# Patient Record
Sex: MALE | Race: BLACK OR AFRICAN AMERICAN | NOT HISPANIC OR LATINO
[De-identification: names, ages, dates, MRNs, and addresses within clinical notes are randomized per-mention and may not be internally consistent; named-entity substitution may affect disease eponyms.]

---

## 2021-01-08 ENCOUNTER — NON-APPOINTMENT (OUTPATIENT)
Age: 66
End: 2021-01-08

## 2021-01-11 ENCOUNTER — NON-APPOINTMENT (OUTPATIENT)
Age: 66
End: 2021-01-11

## 2021-01-11 ENCOUNTER — APPOINTMENT (OUTPATIENT)
Dept: INTERNAL MEDICINE | Facility: CLINIC | Age: 66
End: 2021-01-11
Payer: COMMERCIAL

## 2021-01-11 VITALS
BODY MASS INDEX: 32.08 KG/M2 | OXYGEN SATURATION: 98 % | WEIGHT: 202 LBS | HEART RATE: 78 BPM | DIASTOLIC BLOOD PRESSURE: 98 MMHG | SYSTOLIC BLOOD PRESSURE: 130 MMHG | HEIGHT: 66.5 IN

## 2021-01-11 DIAGNOSIS — Z82.49 FAMILY HISTORY OF ISCHEMIC HEART DISEASE AND OTHER DISEASES OF THE CIRCULATORY SYSTEM: ICD-10-CM

## 2021-01-11 DIAGNOSIS — Z12.5 ENCOUNTER FOR SCREENING FOR MALIGNANT NEOPLASM OF PROSTATE: ICD-10-CM

## 2021-01-11 DIAGNOSIS — Z83.3 FAMILY HISTORY OF DIABETES MELLITUS: ICD-10-CM

## 2021-01-11 DIAGNOSIS — Z82.0 FAMILY HISTORY OF EPILEPSY AND OTHER DISEASES OF THE NERVOUS SYSTEM: ICD-10-CM

## 2021-01-11 DIAGNOSIS — Z80.42 FAMILY HISTORY OF MALIGNANT NEOPLASM OF PROSTATE: ICD-10-CM

## 2021-01-11 PROCEDURE — 99386 PREV VISIT NEW AGE 40-64: CPT | Mod: 25

## 2021-01-11 PROCEDURE — 90472 IMMUNIZATION ADMIN EACH ADD: CPT

## 2021-01-11 PROCEDURE — 90715 TDAP VACCINE 7 YRS/> IM: CPT

## 2021-01-11 PROCEDURE — 90732 PPSV23 VACC 2 YRS+ SUBQ/IM: CPT

## 2021-01-11 PROCEDURE — 93000 ELECTROCARDIOGRAM COMPLETE: CPT

## 2021-01-11 PROCEDURE — 99072 ADDL SUPL MATRL&STAF TM PHE: CPT

## 2021-01-11 PROCEDURE — G0009: CPT

## 2021-01-11 RX ORDER — PEN NEEDLE, DIABETIC 29 G X1/2"
32G X 4 MM NEEDLE, DISPOSABLE MISCELLANEOUS
Qty: 100 | Refills: 0 | Status: DISCONTINUED | COMMUNITY
Start: 2020-11-03 | End: 2021-01-11

## 2021-01-11 RX ORDER — ASPIRIN 81 MG
81 TABLET, DELAYED RELEASE (ENTERIC COATED) ORAL
Refills: 0 | Status: ACTIVE | COMMUNITY

## 2021-01-11 RX ORDER — BLOOD-GLUCOSE METER
W/DEVICE EACH MISCELLANEOUS
Qty: 1 | Refills: 0 | Status: ACTIVE | COMMUNITY
Start: 2020-11-24

## 2021-01-11 NOTE — ASSESSMENT
[FreeTextEntry1] : 1.  General health maintenance -reports that he had a colonoscopy but unsure of date.  Has never had a Pneumovax despite being diabetic and has not had a tetanus shot for years.  Did get a flu shot this past fall.  Depression screening - a PHQ2 was administered to the patient and reviewed at the time of the visit.  The PHQ2 was negative and no further action is required.  Will rescreen in 1 year or prn.  Time <15 min.\par 2.  Diabetes -does not check his sugars at home.  Will check a hemoglobin A1c.  Reiterated need for foot care.  Reminded of need for a yearly eye exam.\par 3.  Hypertension -BP is not optimal but he has not taken his medications in 2 days.  Advised to restart his medications and will recheck blood pressure at next visit.\par 4.  Hyperlipidemia tolerating statin.  Will check lipids.\par 5.  Labs as per plan.\par 6.  Follow-up in 3 months or as needed.

## 2021-01-11 NOTE — HEALTH RISK ASSESSMENT
[Very Good] : ~his/her~  mood as very good [] : No [No] : In the past 12 months have you used drugs other than those required for medical reasons? No [No falls in past year] : Patient reported no falls in the past year [0] : 2) Feeling down, depressed, or hopeless: Not at all (0) [de-identified] : Occasional [de-identified] : No regular activity [HKA3Sclcg] : 0 [Patient reported colonoscopy was normal] : Patient reported colonoscopy was normal [Change in mental status noted] : No change in mental status noted [Language] : denies difficulty with language [Behavior] : denies difficulty with behavior [Learning/Retaining New Information] : denies difficulty learning/retaining new information [Handling Complex Tasks] : denies difficulty handling complex tasks [Reasoning] : denies difficulty with reasoning [Spatial Ability and Orientation] : denies difficulty with spatial ability and orientation [With Significant Other] : lives with significant other [Employed] : employed [Fully functional (bathing, dressing, toileting, transferring, walking, feeding)] : Fully functional (bathing, dressing, toileting, transferring, walking, feeding) [Fully functional (using the telephone, shopping, preparing meals, housekeeping, doing laundry, using] : Fully functional and needs no help or supervision to perform IADLs (using the telephone, shopping, preparing meals, housekeeping, doing laundry, using transportation, managing medications and managing finances) [Reports changes in hearing] : Reports no changes in hearing [Reports changes in vision] : Reports no changes in vision [Smoke Detector] : smoke detector [Carbon Monoxide Detector] : carbon monoxide detector [Guns at Home] : no guns at home [Seat Belt] :  uses seat belt [With Patient/Caregiver] : With Patient/Caregiver [AdvancecareDate] : 01/21 [FreeTextEntry4] : Encouraged to complete a healthcare proxy

## 2021-01-11 NOTE — HISTORY OF PRESENT ILLNESS
[de-identified] : The patient is a 65-year-old male with history of diabetes, hypertension and hyperlipidemia who presents to the office today to establish care.  He also complains of arthritis in his knees and is wondering if he could see an orthopedist.  He is otherwise well he has no specific complaints at this time.  Is overdue for a visit with the eye doctor but when he went in 2019 there were no problems.  Denies any foot lesions.  No problems with myalgias on statin and no complaints of lightheadedness or dizziness.

## 2021-01-11 NOTE — REVIEW OF SYSTEMS
[Negative] : Heme/Lymph [FreeTextEntry9] : Bilateral knee pain and some lower back pain.  Uses Tylenol as needed

## 2021-01-11 NOTE — PHYSICAL EXAM
[No Acute Distress] : no acute distress [Well Nourished] : well nourished [Well Developed] : well developed [Well-Appearing] : well-appearing [Normal Sclera/Conjunctiva] : normal sclera/conjunctiva [EOMI] : extraocular movements intact [Normal Outer Ear/Nose] : the outer ears and nose were normal in appearance [No JVD] : no jugular venous distention [No Lymphadenopathy] : no lymphadenopathy [Supple] : supple [Thyroid Normal, No Nodules] : the thyroid was normal and there were no nodules present [No Respiratory Distress] : no respiratory distress  [No Accessory Muscle Use] : no accessory muscle use [Clear to Auscultation] : lungs were clear to auscultation bilaterally [Normal Rate] : normal rate  [Regular Rhythm] : with a regular rhythm [Normal S1, S2] : normal S1 and S2 [No Murmur] : no murmur heard [No Carotid Bruits] : no carotid bruits [No Abdominal Bruit] : a ~M bruit was not heard ~T in the abdomen [No Varicosities] : no varicosities [Pedal Pulses Present] : the pedal pulses are present [No Edema] : there was no peripheral edema [No Palpable Aorta] : no palpable aorta [No Extremity Clubbing/Cyanosis] : no extremity clubbing/cyanosis [Soft] : abdomen soft [Non Tender] : non-tender [Non-distended] : non-distended [No Masses] : no abdominal mass palpated [No HSM] : no HSM [Normal Bowel Sounds] : normal bowel sounds [Normal Posterior Cervical Nodes] : no posterior cervical lymphadenopathy [Normal Anterior Cervical Nodes] : no anterior cervical lymphadenopathy [No CVA Tenderness] : no CVA  tenderness [No Spinal Tenderness] : no spinal tenderness [No Joint Swelling] : no joint swelling [Grossly Normal Strength/Tone] : grossly normal strength/tone [No Rash] : no rash [Coordination Grossly Intact] : coordination grossly intact [No Focal Deficits] : no focal deficits [Normal Gait] : normal gait [Deep Tendon Reflexes (DTR)] : deep tendon reflexes were 2+ and symmetric [Normal Affect] : the affect was normal [Normal Insight/Judgement] : insight and judgment were intact [Comprehensive Foot Exam Normal] : Right and left foot were examined and both feet are normal. No ulcers in either foot. Toes are normal and with full ROM.  Normal tactile sensation with monofilament testing throughout both feet

## 2021-03-17 LAB
25(OH)D3 SERPL-MCNC: 19.7 NG/ML
ALBUMIN SERPL ELPH-MCNC: 4.5 G/DL
ALP BLD-CCNC: 57 U/L
ALT SERPL-CCNC: 29 U/L
ANION GAP SERPL CALC-SCNC: 12 MMOL/L
AST SERPL-CCNC: 18 U/L
BASOPHILS # BLD AUTO: 0.05 K/UL
BASOPHILS NFR BLD AUTO: 0.5 %
BILIRUB SERPL-MCNC: 0.4 MG/DL
BUN SERPL-MCNC: 11 MG/DL
CALCIUM SERPL-MCNC: 9.7 MG/DL
CHLORIDE SERPL-SCNC: 99 MMOL/L
CHOLEST SERPL-MCNC: 140 MG/DL
CO2 SERPL-SCNC: 27 MMOL/L
CREAT SERPL-MCNC: 1.14 MG/DL
CREAT SPEC-SCNC: 230 MG/DL
EOSINOPHIL # BLD AUTO: 0.21 K/UL
EOSINOPHIL NFR BLD AUTO: 2 %
ESTIMATED AVERAGE GLUCOSE: 186 MG/DL
FOLATE SERPL-MCNC: 10.6 NG/ML
GLUCOSE SERPL-MCNC: 151 MG/DL
HBA1C MFR BLD HPLC: 8.1 %
HCT VFR BLD CALC: 48.1 %
HCV AB SER QL: NONREACTIVE
HCV S/CO RATIO: 0.1 S/CO
HDLC SERPL-MCNC: 45 MG/DL
HGB BLD-MCNC: 15.4 G/DL
IMM GRANULOCYTES NFR BLD AUTO: 0.3 %
LDLC SERPL CALC-MCNC: 72 MG/DL
LYMPHOCYTES # BLD AUTO: 4.22 K/UL
LYMPHOCYTES NFR BLD AUTO: 39.4 %
MAN DIFF?: NORMAL
MCHC RBC-ENTMCNC: 29.4 PG
MCHC RBC-ENTMCNC: 32 GM/DL
MCV RBC AUTO: 91.8 FL
MICROALBUMIN 24H UR DL<=1MG/L-MCNC: 3.8 MG/DL
MICROALBUMIN/CREAT 24H UR-RTO: 16 MG/G
MONOCYTES # BLD AUTO: 0.63 K/UL
MONOCYTES NFR BLD AUTO: 5.9 %
NEUTROPHILS # BLD AUTO: 5.58 K/UL
NEUTROPHILS NFR BLD AUTO: 51.9 %
NONHDLC SERPL-MCNC: 95 MG/DL
PLATELET # BLD AUTO: 214 K/UL
POTASSIUM SERPL-SCNC: 4.2 MMOL/L
PROT SERPL-MCNC: 7.3 G/DL
PSA SERPL-MCNC: 4.24 NG/ML
RBC # BLD: 5.24 M/UL
RBC # FLD: 13.4 %
SODIUM SERPL-SCNC: 138 MMOL/L
T4 FREE SERPL-MCNC: 1 NG/DL
TRIGL SERPL-MCNC: 117 MG/DL
TSH SERPL-ACNC: 0.98 UIU/ML
VIT B12 SERPL-MCNC: 544 PG/ML
WBC # FLD AUTO: 10.72 K/UL

## 2021-04-15 ENCOUNTER — NON-APPOINTMENT (OUTPATIENT)
Age: 66
End: 2021-04-15

## 2021-04-16 ENCOUNTER — APPOINTMENT (OUTPATIENT)
Dept: INTERNAL MEDICINE | Facility: CLINIC | Age: 66
End: 2021-04-16
Payer: COMMERCIAL

## 2021-04-16 VITALS
HEART RATE: 83 BPM | SYSTOLIC BLOOD PRESSURE: 122 MMHG | WEIGHT: 198 LBS | OXYGEN SATURATION: 95 % | DIASTOLIC BLOOD PRESSURE: 84 MMHG | BODY MASS INDEX: 31.48 KG/M2

## 2021-04-16 LAB
GLUCOSE BLDC GLUCOMTR-MCNC: 83
HBA1C MFR BLD HPLC: 7.2

## 2021-04-16 PROCEDURE — 99072 ADDL SUPL MATRL&STAF TM PHE: CPT

## 2021-04-16 PROCEDURE — 83036 HEMOGLOBIN GLYCOSYLATED A1C: CPT | Mod: QW

## 2021-04-16 PROCEDURE — 99214 OFFICE O/P EST MOD 30 MIN: CPT | Mod: 25

## 2021-04-16 PROCEDURE — 82962 GLUCOSE BLOOD TEST: CPT | Mod: NC

## 2021-04-16 NOTE — ASSESSMENT
[FreeTextEntry1] : 1.  Diabetes mellitus -well controlled with marked improvement of his hemoglobin A1c to 7.2 which is essentially at goal.  Foot care reiterated.  He is overdue for an eye exam as his appointments were canceled due to Covid.  Advised him to schedule at his convenience.  Continue current management.\par 2.  Hypertension with blood pressure improved from prior visit.  We will continue to follow.  Continue current management.\par 3.  Elevated PSA on screening labs at first visit.  Will check a free PSA and total for today.\par 4.  Hyperlipidemia.  Last LDL reviewed and was at goal.  Continue atorvastatin 40 mg daily.\par 5.  Acute grief reaction secondary to the loss of his mother.  Patient made aware that we have behavioral health here onsite should he feel the need to speak with someone.  Emotional support given.\par 6.  Follow-up in 3 months or as needed.

## 2021-04-16 NOTE — HEALTH RISK ASSESSMENT
[Patient reported colonoscopy was normal] : Patient reported colonoscopy was normal [ColonoscopyDate] : 01/18

## 2021-04-16 NOTE — HISTORY OF PRESENT ILLNESS
[de-identified] : 66-year-old male with a history of diabetes, hypertension and hyperlipidemia here for a follow-up visit as his hemoglobin A1c was elevated at the time of his initial visit.  He tells me that his mother passed away last month from a stroke so he is feeling somewhat depressed.  Would like to go down to [place] next month which is where his mom was.  Has not been able to procure a Covid vaccine as of yet.  Denies any problems with his meds.  No GI problems since increasing the dose of the metformin.

## 2021-04-17 LAB
PSA FREE FLD-MCNC: 27 %
PSA FREE SERPL-MCNC: 1.27 NG/ML
PSA SERPL-MCNC: 4.72 NG/ML

## 2021-07-22 ENCOUNTER — NON-APPOINTMENT (OUTPATIENT)
Age: 66
End: 2021-07-22

## 2021-07-23 ENCOUNTER — APPOINTMENT (OUTPATIENT)
Dept: INTERNAL MEDICINE | Facility: CLINIC | Age: 66
End: 2021-07-23
Payer: COMMERCIAL

## 2021-07-23 VITALS
OXYGEN SATURATION: 96 % | DIASTOLIC BLOOD PRESSURE: 82 MMHG | BODY MASS INDEX: 31.32 KG/M2 | SYSTOLIC BLOOD PRESSURE: 120 MMHG | WEIGHT: 197 LBS | HEART RATE: 82 BPM

## 2021-07-23 DIAGNOSIS — K21.9 GASTRO-ESOPHAGEAL REFLUX DISEASE W/OUT ESOPHAGITIS: ICD-10-CM

## 2021-07-23 LAB
GLUCOSE BLDC GLUCOMTR-MCNC: 129
HBA1C MFR BLD HPLC: 7.9

## 2021-07-23 PROCEDURE — 83036 HEMOGLOBIN GLYCOSYLATED A1C: CPT | Mod: QW

## 2021-07-23 PROCEDURE — 99072 ADDL SUPL MATRL&STAF TM PHE: CPT

## 2021-07-23 PROCEDURE — 99213 OFFICE O/P EST LOW 20 MIN: CPT | Mod: 25

## 2021-07-23 PROCEDURE — 82962 GLUCOSE BLOOD TEST: CPT | Mod: NC

## 2021-07-23 NOTE — PHYSICAL EXAM
[No Acute Distress] : no acute distress [No JVD] : no jugular venous distention [Normal] : normal rate, regular rhythm, normal S1 and S2 and no murmur heard [No Carotid Bruits] : no carotid bruits [No Abdominal Bruit] : a ~M bruit was not heard ~T in the abdomen [Pedal Pulses Present] : the pedal pulses are present [No Edema] : there was no peripheral edema [Soft] : abdomen soft [Non Tender] : non-tender [Normal Bowel Sounds] : normal bowel sounds [No Rash] : no rash

## 2021-07-24 NOTE — HISTORY OF PRESENT ILLNESS
[FreeTextEntry1] : RPA for DM, HTN [de-identified] : PATRICK KAMARA  is a 66 year old M with  diabetes, hypertension and hyperlipidemia  presented today for f/u DM, HTN. He dose not exercise at all because he's busy at work and like melon. He eats 3-4 meals a day including snacks. No fever, chills, CP, SOB,  n/v/d/c. Regularly checking his feet. No report of polydipsia or polyuria. Reported increased phlegm in the morning and dry nose/mouth. \par

## 2021-07-24 NOTE — REVIEW OF SYSTEMS
[Fever] : no fever [Chills] : no chills [Discharge] : no discharge [Earache] : no earache [Chest Pain] : no chest pain [Palpitations] : no palpitations [Lower Ext Edema] : no lower extremity edema [Shortness Of Breath] : no shortness of breath [Abdominal Pain] : no abdominal pain [Dysuria] : no dysuria [Joint Pain] : no joint pain [Headache] : no headache

## 2021-07-24 NOTE — END OF VISIT
[FreeTextEntry3] : Patient seen and examined in conjunction with Pearl Nam. NP acting as practitioner and scribe.  I agree with the history and plan as outlined with modifications documented as appropriate.\par

## 2021-07-24 NOTE — ASSESSMENT
[FreeTextEntry1] : #T2DM\par PCOT increased to HgA1c  7.9% ( 3month ago 7.2%) Fingerstick 129\par Continue current management with Metformin 1000mg po bid and DASH diet. \par Encouraged to do exercise regularly,  lose weight and cut down sweet fruit such as melon.\par Refilled all his current med. \par \par #HTN\par BP controlled 120/82\par Continue current management with Enalapril 10mg po qd\par \par RTC in 3 months for DM.\par

## 2021-10-19 ENCOUNTER — MED ADMIN CHARGE (OUTPATIENT)
Age: 66
End: 2021-10-19

## 2021-10-19 ENCOUNTER — RX RENEWAL (OUTPATIENT)
Age: 66
End: 2021-10-19

## 2021-10-19 ENCOUNTER — APPOINTMENT (OUTPATIENT)
Dept: INTERNAL MEDICINE | Facility: CLINIC | Age: 66
End: 2021-10-19
Payer: COMMERCIAL

## 2021-10-19 VITALS
OXYGEN SATURATION: 97 % | SYSTOLIC BLOOD PRESSURE: 122 MMHG | HEIGHT: 66.5 IN | BODY MASS INDEX: 30.97 KG/M2 | DIASTOLIC BLOOD PRESSURE: 80 MMHG | WEIGHT: 195 LBS | HEART RATE: 80 BPM | RESPIRATION RATE: 14 BRPM

## 2021-10-19 LAB
GLUCOSE BLDC GLUCOMTR-MCNC: 122
HBA1C MFR BLD HPLC: 7.8

## 2021-10-19 PROCEDURE — 99213 OFFICE O/P EST LOW 20 MIN: CPT | Mod: 25

## 2021-10-19 PROCEDURE — 90662 IIV NO PRSV INCREASED AG IM: CPT

## 2021-10-19 PROCEDURE — 83036 HEMOGLOBIN GLYCOSYLATED A1C: CPT | Mod: QW

## 2021-10-19 PROCEDURE — G0008: CPT

## 2021-10-19 PROCEDURE — 82962 GLUCOSE BLOOD TEST: CPT | Mod: NC

## 2021-10-19 NOTE — PHYSICAL EXAM
[de-identified] : WDWN in NAD\par HEENT:  unremarkable\par Neck:  supple, no JVD, no LN\par Lungs:  CTA B/L, no W/R/R\par Heart:  Reg rate, +S1S2, no M/R/G\par Abdomen:  soft, NT, ND, +BS, no masses, no HS-megaly\par Genital: No pubic or genital lesions noted.\par Ext:  no C/C/E\par Neuro:  no focal deficits

## 2021-10-19 NOTE — ASSESSMENT
[FreeTextEntry1] : #T2DM\par PCOT today was  HgA1c 7.8 % Fingerstick 122 vs 7.9% and 129 on 7/23/21.\par Pt's been taking Metformin 1000mg po bid.\par Start Jardiance 10mg po qd for cardiovascular and Renal benefit and better BS management. \par Possible adverse reaction such as UTI was informed. \par He should call us if his insurance does not approve Jardiance or any concerns. \par No foot issues. Reminded to see eye doctor for retinal exam. \par Encouraged to do exercise regularly, lose weight and cut down sweet fruit such as melon.\par \par #HTN\par BP controlled as 122/80.\par Continue current management with Enalapril 10mg po qd.\par \par # HLD\par Pt stated he took Atorvastatin 20mg qd not 40mg.\par Renewed his statin to the pharmacy.\par \par # vaccination.\par 2 Moderna vaccine done on 4/21/21 and 5/19/21.\par Pt agreed to get flu vaccine today.\par \par RTC in 3 months for CPE.

## 2021-10-19 NOTE — HISTORY OF PRESENT ILLNESS
[FreeTextEntry1] : RPA [de-identified] : PATRICK KAMARA  is a 66 year old Black M with history of  HTN, T2DM, HLD presented today for f/u for the same issue.\par RE diet: 3-4 meals a day. Busy form early morning 4am until 2am. Napping in the middle of day. working at a ware house. He admitted he likes sweet food. Denied polyuria or polydipsia.\par RE exercise: no time to do, occasionally do walking. \par Pt will get Flu vaccine today.

## 2021-10-19 NOTE — END OF VISIT
[FreeTextEntry3] : Patient seen and examined in conjunction with Pearl Nam. NP acting as practitioner and scribe.  I agree with the history and plan as outlined with modifications documented as appropriate.\par \par

## 2021-10-19 NOTE — REVIEW OF SYSTEMS
[FreeTextEntry2] : Constitutional:  no fever and no chills. \par Eyes:  no discharge. \par HEENT:  no earache. \par Cardiovascular:  no chest pain, no palpitations and no lower extremity edema. \par Respiratory:  no shortness of breath, no wheezing and no cough. \par Gastrointestinal:  no abdominal pain, no nausea and no vomiting. \par Genitourinary:  no dysuria. \par Musculoskeletal:  no joint pain. \par Integumentary:  no itching. \par Neurological:  no headache. \par Psychiatric:  not suicidal. \par Hematologic/Lymphatic:  no easy bleeding.

## 2022-01-12 ENCOUNTER — APPOINTMENT (OUTPATIENT)
Dept: INTERNAL MEDICINE | Facility: CLINIC | Age: 67
End: 2022-01-12
Payer: COMMERCIAL

## 2022-01-12 VITALS
HEART RATE: 73 BPM | BODY MASS INDEX: 31.76 KG/M2 | OXYGEN SATURATION: 98 % | HEIGHT: 66.5 IN | WEIGHT: 200 LBS | RESPIRATION RATE: 14 BRPM | SYSTOLIC BLOOD PRESSURE: 150 MMHG | DIASTOLIC BLOOD PRESSURE: 98 MMHG

## 2022-01-12 PROCEDURE — G0444 DEPRESSION SCREEN ANNUAL: CPT | Mod: 59

## 2022-01-12 PROCEDURE — 99397 PER PM REEVAL EST PAT 65+ YR: CPT

## 2022-01-12 NOTE — HEALTH RISK ASSESSMENT
[Very Good] : ~his/her~  mood as very good [No] : In the past 12 months have you used drugs other than those required for medical reasons? No [No falls in past year] : Patient reported no falls in the past year [0] : 2) Feeling down, depressed, or hopeless: Not at all (0) [Patient reported colonoscopy was normal] : Patient reported colonoscopy was normal [With Significant Other] : lives with significant other [Employed] : employed [Fully functional (bathing, dressing, toileting, transferring, walking, feeding)] : Fully functional (bathing, dressing, toileting, transferring, walking, feeding) [Fully functional (using the telephone, shopping, preparing meals, housekeeping, doing laundry, using] : Fully functional and needs no help or supervision to perform IADLs (using the telephone, shopping, preparing meals, housekeeping, doing laundry, using transportation, managing medications and managing finances) [Smoke Detector] : smoke detector [Carbon Monoxide Detector] : carbon monoxide detector [Seat Belt] :  uses seat belt [Never] : Never [PHQ-2 Negative - No further assessment needed] : PHQ-2 Negative - No further assessment needed [de-identified] : Occasional [de-identified] : No regular activity [de-identified] : poor [[id number]] : 0 [EyeExamDate] : did not complete [Change in mental status noted] : No change in mental status noted [Language] : denies difficulty with language [Behavior] : denies difficulty with behavior [Learning/Retaining New Information] : denies difficulty learning/retaining new information [Handling Complex Tasks] : denies difficulty handling complex tasks [Reasoning] : denies difficulty with reasoning [Spatial Ability and Orientation] : denies difficulty with spatial ability and orientation [] :  [Feels Safe at Home] : Feels safe at home [Reports changes in hearing] : Reports no changes in hearing [Reports changes in vision] : Reports no changes in vision [Guns at Home] : no guns at home [FreeTextEntry2] : works in the warehouse at  [employer] [With Patient/Caregiver] : , with patient/caregiver [Name: ___] : Health Care Proxy's Name: [unfilled]  [Relationship: ___] : Relationship: [unfilled] [AdvancecareDate] : 01/22 [FreeTextEntry4] : Has not done an official HCP but names his wife.  REc he complete paperwork.

## 2022-01-12 NOTE — HISTORY OF PRESENT ILLNESS
[de-identified] : The patient is a 66-year-old male with history of diabetes, hypertension, Elevated PSA and hyperlipidemia who presents to the office today for a comprehensive evaluation.   He is  well he has no specific complaints at this time.  Is overdue for a visit with the eye doctor as he went but could not stay to complete the visit as he had to go to work.  Denies any foot lesions.  No problems with myalgias on statin and no complaints of lightheadedness or dizziness.  He decided to stop his medications in Nov because he though he was doing good with green juice.

## 2022-01-12 NOTE — ASSESSMENT
[FreeTextEntry1] : 1.  General health maintenance -reports that he had a colonoscopy but unsure of date.  TdaP and pneumovax are UTD.  He had his COVID series.   Did get a flu shot this past fall.  Depression screening - a PHQ2 was administered to the patient and reviewed at the time of the visit.  The PHQ2 was negative and no further action is required.  Will rescreen in 1 year or prn.  Has an appointment this Sat for his COVID booster.\par 2.  Diabetes -does not check his sugars at home. HgbA1C = 8.7.  Reiterated need for foot care.  Reminded of need for a yearly eye exam.  Needs to restart his medications.  Compliance stressed.  Risks of stroke, MI and death discussed.\par 3.  Hypertension -BP is not optimal but he has not taken his medications in 2 months.  Advised to restart his medications and will recheck blood pressure at next visit.\par 4.  Hyperlipidemia but has not been taking his statin.\par 5.  Labs as per plan.\par 6.  Elevated PSA without any symptoms of BPH.  Will check a PSA profile.\par 7.  Follow-up in 3 months or as needed.

## 2022-01-21 LAB
25(OH)D3 SERPL-MCNC: 23.1 NG/ML
ALBUMIN SERPL ELPH-MCNC: 4.8 G/DL
ALP BLD-CCNC: 63 U/L
ALT SERPL-CCNC: 24 U/L
ANION GAP SERPL CALC-SCNC: 14 MMOL/L
AST SERPL-CCNC: 14 U/L
BASOPHILS # BLD AUTO: 0.05 K/UL
BASOPHILS NFR BLD AUTO: 0.5 %
BILIRUB SERPL-MCNC: 0.4 MG/DL
BUN SERPL-MCNC: 10 MG/DL
CALCIUM SERPL-MCNC: 9.8 MG/DL
CHLORIDE SERPL-SCNC: 98 MMOL/L
CHOLEST SERPL-MCNC: 125 MG/DL
CO2 SERPL-SCNC: 26 MMOL/L
CREAT SERPL-MCNC: 1.12 MG/DL
CREAT SPEC-SCNC: 173 MG/DL
EOSINOPHIL # BLD AUTO: 0.17 K/UL
EOSINOPHIL NFR BLD AUTO: 1.7 %
ESTIMATED AVERAGE GLUCOSE: 206 MG/DL
FOLATE SERPL-MCNC: 11.9 NG/ML
GLUCOSE SERPL-MCNC: 157 MG/DL
HBA1C MFR BLD HPLC: 8.8 %
HCT VFR BLD CALC: 45.1 %
HDLC SERPL-MCNC: 48 MG/DL
HGB BLD-MCNC: 14.7 G/DL
IMM GRANULOCYTES NFR BLD AUTO: 0.3 %
LDLC SERPL CALC-MCNC: 58 MG/DL
LYMPHOCYTES # BLD AUTO: 4.52 K/UL
LYMPHOCYTES NFR BLD AUTO: 45.2 %
MAN DIFF?: NORMAL
MCHC RBC-ENTMCNC: 29.9 PG
MCHC RBC-ENTMCNC: 32.6 GM/DL
MCV RBC AUTO: 91.7 FL
MICROALBUMIN 24H UR DL<=1MG/L-MCNC: 1.6 MG/DL
MICROALBUMIN/CREAT 24H UR-RTO: 10 MG/G
MONOCYTES # BLD AUTO: 0.65 K/UL
MONOCYTES NFR BLD AUTO: 6.5 %
NEUTROPHILS # BLD AUTO: 4.58 K/UL
NEUTROPHILS NFR BLD AUTO: 45.8 %
NONHDLC SERPL-MCNC: 78 MG/DL
PLATELET # BLD AUTO: 238 K/UL
POTASSIUM SERPL-SCNC: 4 MMOL/L
PROT SERPL-MCNC: 7.7 G/DL
PSA FREE FLD-MCNC: 27 %
PSA FREE SERPL-MCNC: 1.04 NG/ML
PSA SERPL-MCNC: 3.83 NG/ML
RBC # BLD: 4.92 M/UL
RBC # FLD: 13.3 %
SODIUM SERPL-SCNC: 138 MMOL/L
T4 FREE SERPL-MCNC: 1.2 NG/DL
TRIGL SERPL-MCNC: 102 MG/DL
TSH SERPL-ACNC: 1.51 UIU/ML
VIT B12 SERPL-MCNC: 553 PG/ML
WBC # FLD AUTO: 10 K/UL

## 2022-04-12 ENCOUNTER — APPOINTMENT (OUTPATIENT)
Dept: INTERNAL MEDICINE | Facility: CLINIC | Age: 67
End: 2022-04-12
Payer: COMMERCIAL

## 2022-04-12 VITALS
BODY MASS INDEX: 31.76 KG/M2 | SYSTOLIC BLOOD PRESSURE: 124 MMHG | WEIGHT: 200 LBS | RESPIRATION RATE: 14 BRPM | HEIGHT: 66.5 IN | DIASTOLIC BLOOD PRESSURE: 80 MMHG | OXYGEN SATURATION: 98 % | HEART RATE: 80 BPM

## 2022-04-12 LAB — HBA1C MFR BLD HPLC: 8.4

## 2022-04-12 PROCEDURE — 83036 HEMOGLOBIN GLYCOSYLATED A1C: CPT | Mod: QW

## 2022-04-12 PROCEDURE — 99214 OFFICE O/P EST MOD 30 MIN: CPT | Mod: 25

## 2022-04-12 NOTE — HISTORY OF PRESENT ILLNESS
[de-identified] : 67-year-old male with a history of diabetes, hypertension and hyperlipidemia here for follow-up of the same.  When I last saw him he had stopped taking most of his medications.  He reported that he had restarted them but his A1c remains elevated as it is 8.4 today.  When I pointedly asked about his Jardiance, he is not taking it as he tells me it was not covered by his insurance.  I received no notification about this in the past nor alternatives that we could have used.  He is also complaining of erectile dysfunction.  Tried Viagra just once in the past with no effect.

## 2022-04-12 NOTE — ASSESSMENT
[FreeTextEntry1] : 1.  Diabetes mellitus not well controlled.  Will attempt to get Jardiance once again for the patient.  Preauthorization process started.  Also told him there are coupons available to help with the co-pay.  Stressed importance of glucose control and that this is likely the biggest contributor to his erectile dysfunction.\par 2.  Hypertension, well controlled at this time.  Continue current management.\par 3.  Hyperlipidemia with LDL at goal.  LDL in January was 58.\par 4.  Erectile dysfunction -discussed use of Viagra and that there are a lot of psychological factors that could prevent the pill from working.  He did not give an adequate trial.  Rx written for the 50 mg strength to try 1-2 1 hour prior to activity.\par 5.  Return to office in 3 months.

## 2022-08-12 ENCOUNTER — APPOINTMENT (OUTPATIENT)
Dept: INTERNAL MEDICINE | Facility: CLINIC | Age: 67
End: 2022-08-12

## 2022-08-12 VITALS
HEIGHT: 66.5 IN | HEART RATE: 78 BPM | SYSTOLIC BLOOD PRESSURE: 122 MMHG | OXYGEN SATURATION: 98 % | DIASTOLIC BLOOD PRESSURE: 82 MMHG | BODY MASS INDEX: 31.6 KG/M2 | WEIGHT: 199 LBS

## 2022-08-12 DIAGNOSIS — L80 VITILIGO: ICD-10-CM

## 2022-08-12 LAB — HBA1C MFR BLD HPLC: 7.7

## 2022-08-12 PROCEDURE — 83036 HEMOGLOBIN GLYCOSYLATED A1C: CPT | Mod: QW

## 2022-08-12 PROCEDURE — 99214 OFFICE O/P EST MOD 30 MIN: CPT | Mod: 25

## 2022-08-12 NOTE — ASSESSMENT
[FreeTextEntry1] : 1.  Diabetes mellitus with improved control.  Hemoglobin A1c today 7.7, down from 8.4 at his last visit.  Reiterated need for yearly eye exam which she says is up-to-date and daily foot inspection.  We will increase the Jardiance to 25 mg daily to target a goal of 7.0 for his hemoglobin A1c.\par 2.  Hypertension with good blood pressure.  Continue current management.\par 3.  Hyperlipidemia on atorvastatin.  LDL at goal of less than 70 as it was 56 the last time it was done.\par 4.  Linear area of depigmented skin.  Differential includes lichen planus or atrophicus but he is also having some other areas of vitiligo.  Will refer to dermatology.  TFTs done in January were reviewed and were normal.\par 5.  Follow-up in 3 to 4 months or as needed

## 2022-08-12 NOTE — HISTORY OF PRESENT ILLNESS
[de-identified] : 67-year-old man with a history of diabetes, hypertension, hyperlipidemia here for follow-up of the same.  Reports that he has been feeling well.  Has a white area on his scrotum and is wondering what it might be.  No complaints of polyuria or polydipsia.  No complaints of chest pain, shortness of breath or palpitations.  No problems with his medications and tells me that he has been taking them.

## 2022-10-13 ENCOUNTER — APPOINTMENT (OUTPATIENT)
Dept: DERMATOLOGY | Facility: CLINIC | Age: 67
End: 2022-10-13

## 2022-10-13 DIAGNOSIS — L90.5 SCAR CONDITIONS AND FIBROSIS OF SKIN: ICD-10-CM

## 2022-10-13 DIAGNOSIS — B35.3 TINEA PEDIS: ICD-10-CM

## 2022-10-13 PROCEDURE — 99203 OFFICE O/P NEW LOW 30 MIN: CPT

## 2022-11-16 ENCOUNTER — APPOINTMENT (OUTPATIENT)
Dept: INTERNAL MEDICINE | Facility: CLINIC | Age: 67
End: 2022-11-16

## 2022-11-16 VITALS
HEIGHT: 66.5 IN | BODY MASS INDEX: 31.76 KG/M2 | WEIGHT: 200 LBS | HEART RATE: 82 BPM | SYSTOLIC BLOOD PRESSURE: 126 MMHG | OXYGEN SATURATION: 95 % | DIASTOLIC BLOOD PRESSURE: 92 MMHG

## 2022-11-16 LAB — HBA1C MFR BLD HPLC: 7.8

## 2022-11-16 PROCEDURE — 83036 HEMOGLOBIN GLYCOSYLATED A1C: CPT | Mod: QW

## 2022-11-16 PROCEDURE — 99213 OFFICE O/P EST LOW 20 MIN: CPT | Mod: 25

## 2022-12-14 ENCOUNTER — NON-APPOINTMENT (OUTPATIENT)
Age: 67
End: 2022-12-14

## 2022-12-14 LAB
APPEARANCE: CLEAR
BACTERIA UR CULT: ABNORMAL
BACTERIA: NEGATIVE
BILIRUBIN URINE: NEGATIVE
BLOOD URINE: NEGATIVE
C TRACH RRNA SPEC QL NAA+PROBE: NOT DETECTED
COLOR: NORMAL
GLUCOSE QUALITATIVE U: NEGATIVE
HYALINE CASTS: 0 /LPF
KETONES URINE: NEGATIVE
LEUKOCYTE ESTERASE URINE: NEGATIVE
MICROSCOPIC-UA: NORMAL
N GONORRHOEA RRNA SPEC QL NAA+PROBE: NOT DETECTED
NITRITE URINE: NEGATIVE
PH URINE: 7.5
PROTEIN URINE: NEGATIVE
RED BLOOD CELLS URINE: 2 /HPF
SOURCE AMPLIFICATION: NORMAL
SPECIFIC GRAVITY URINE: 1.01
SQUAMOUS EPITHELIAL CELLS: 0 /HPF
URINE CYTOLOGY: NORMAL
UROBILINOGEN URINE: NORMAL
WHITE BLOOD CELLS URINE: 0 /HPF

## 2022-12-14 NOTE — HISTORY OF PRESENT ILLNESS
[de-identified] : 66 yo male with a h/o DM, HTN, hyperlipidemia and GERD here for f/u.  3 weeks ago had 3 days of seeing blood spots on his underwear after urinating.  No dysuria or urinary frequency during that time and it has since resolved.  No c/o lightheadedness or dizziness, polyuria or polydipsia.\par \par

## 2022-12-14 NOTE — ASSESSMENT
[FreeTextEntry1] : 1.  DM - HgbA1C 7.8 today.  Discussed diet and exercise.  He wishes to work on this before adding a thrid agent for his diabetes.  Foot care and eye care reiterated.\par 2.  Gross hematuria - check U/S, culture and urine cytology.  Urology referral.\par 3.  HTN - BP is acceptable.  Continue current management.\par 4.  RTO 1 month to go over sugar or prn.

## 2022-12-15 ENCOUNTER — APPOINTMENT (OUTPATIENT)
Dept: INTERNAL MEDICINE | Facility: CLINIC | Age: 67
End: 2022-12-15

## 2022-12-15 ENCOUNTER — EMERGENCY (EMERGENCY)
Facility: HOSPITAL | Age: 67
LOS: 1 days | Discharge: HOME | End: 2022-12-15
Attending: EMERGENCY MEDICINE | Admitting: EMERGENCY MEDICINE

## 2022-12-15 VITALS
SYSTOLIC BLOOD PRESSURE: 124 MMHG | HEIGHT: 66.5 IN | WEIGHT: 199 LBS | HEART RATE: 103 BPM | OXYGEN SATURATION: 97 % | BODY MASS INDEX: 31.6 KG/M2 | DIASTOLIC BLOOD PRESSURE: 88 MMHG

## 2022-12-15 VITALS
DIASTOLIC BLOOD PRESSURE: 84 MMHG | OXYGEN SATURATION: 100 % | HEART RATE: 89 BPM | SYSTOLIC BLOOD PRESSURE: 139 MMHG | TEMPERATURE: 98 F | RESPIRATION RATE: 18 BRPM

## 2022-12-15 VITALS
HEART RATE: 88 BPM | RESPIRATION RATE: 15 BRPM | OXYGEN SATURATION: 100 % | DIASTOLIC BLOOD PRESSURE: 129 MMHG | SYSTOLIC BLOOD PRESSURE: 80 MMHG | TEMPERATURE: 98 F

## 2022-12-15 LAB
APPEARANCE UR: CLEAR — SIGNIFICANT CHANGE UP
BASOPHILS # BLD AUTO: 0.06 K/UL — SIGNIFICANT CHANGE UP (ref 0–0.2)
BASOPHILS NFR BLD AUTO: 0.3 % — SIGNIFICANT CHANGE UP (ref 0–2)
BILIRUB UR-MCNC: NEGATIVE — SIGNIFICANT CHANGE UP
BLOOD GAS VENOUS COMPREHENSIVE RESULT: SIGNIFICANT CHANGE UP
COLOR SPEC: YELLOW — SIGNIFICANT CHANGE UP
DIFF PNL FLD: ABNORMAL
EOSINOPHIL # BLD AUTO: 0.07 K/UL — SIGNIFICANT CHANGE UP (ref 0–0.5)
EOSINOPHIL NFR BLD AUTO: 0.4 % — SIGNIFICANT CHANGE UP (ref 0–6)
GLUCOSE UR QL: NEGATIVE — SIGNIFICANT CHANGE UP
HCT VFR BLD CALC: 40.8 % — SIGNIFICANT CHANGE UP (ref 39–50)
HGB BLD-MCNC: 13.1 G/DL — SIGNIFICANT CHANGE UP (ref 13–17)
IANC: 13.93 K/UL — HIGH (ref 1.8–7.4)
IMM GRANULOCYTES NFR BLD AUTO: 0.4 % — SIGNIFICANT CHANGE UP (ref 0–0.9)
KETONES UR-MCNC: NEGATIVE — SIGNIFICANT CHANGE UP
LEUKOCYTE ESTERASE UR-ACNC: ABNORMAL
LYMPHOCYTES # BLD AUTO: 19.1 % — SIGNIFICANT CHANGE UP (ref 13–44)
LYMPHOCYTES # BLD AUTO: 3.62 K/UL — HIGH (ref 1–3.3)
MCHC RBC-ENTMCNC: 29.2 PG — SIGNIFICANT CHANGE UP (ref 27–34)
MCHC RBC-ENTMCNC: 32.1 GM/DL — SIGNIFICANT CHANGE UP (ref 32–36)
MCV RBC AUTO: 91.1 FL — SIGNIFICANT CHANGE UP (ref 80–100)
MONOCYTES # BLD AUTO: 1.2 K/UL — HIGH (ref 0–0.9)
MONOCYTES NFR BLD AUTO: 6.3 % — SIGNIFICANT CHANGE UP (ref 2–14)
NEUTROPHILS # BLD AUTO: 13.93 K/UL — HIGH (ref 1.8–7.4)
NEUTROPHILS NFR BLD AUTO: 73.5 % — SIGNIFICANT CHANGE UP (ref 43–77)
NITRITE UR-MCNC: NEGATIVE — SIGNIFICANT CHANGE UP
NRBC # BLD: 0 /100 WBCS — SIGNIFICANT CHANGE UP (ref 0–0)
NRBC # FLD: 0 K/UL — SIGNIFICANT CHANGE UP (ref 0–0)
PH UR: 6 — SIGNIFICANT CHANGE UP (ref 5–8)
PLATELET # BLD AUTO: 221 K/UL — SIGNIFICANT CHANGE UP (ref 150–400)
PROT UR-MCNC: ABNORMAL
RBC # BLD: 4.48 M/UL — SIGNIFICANT CHANGE UP (ref 4.2–5.8)
RBC # FLD: 13.9 % — SIGNIFICANT CHANGE UP (ref 10.3–14.5)
SP GR SPEC: 1.02 — SIGNIFICANT CHANGE UP (ref 1.01–1.05)
UROBILINOGEN FLD QL: SIGNIFICANT CHANGE UP
WBC # BLD: 18.96 K/UL — HIGH (ref 3.8–10.5)
WBC # FLD AUTO: 18.96 K/UL — HIGH (ref 3.8–10.5)

## 2022-12-15 PROCEDURE — 99213 OFFICE O/P EST LOW 20 MIN: CPT

## 2022-12-15 PROCEDURE — 76870 US EXAM SCROTUM: CPT | Mod: 26

## 2022-12-15 PROCEDURE — 99285 EMERGENCY DEPT VISIT HI MDM: CPT

## 2022-12-15 RX ORDER — SODIUM CHLORIDE 9 MG/ML
1000 INJECTION INTRAMUSCULAR; INTRAVENOUS; SUBCUTANEOUS ONCE
Refills: 0 | Status: DISCONTINUED | OUTPATIENT
Start: 2022-12-15 | End: 2022-12-15

## 2022-12-15 RX ORDER — KETOROLAC TROMETHAMINE 30 MG/ML
15 SYRINGE (ML) INJECTION ONCE
Refills: 0 | Status: DISCONTINUED | OUTPATIENT
Start: 2022-12-15 | End: 2022-12-15

## 2022-12-15 RX ORDER — SODIUM CHLORIDE 9 MG/ML
2000 INJECTION INTRAMUSCULAR; INTRAVENOUS; SUBCUTANEOUS ONCE
Refills: 0 | Status: COMPLETED | OUTPATIENT
Start: 2022-12-15 | End: 2022-12-15

## 2022-12-15 RX ADMIN — Medication 15 MILLIGRAM(S): at 23:26

## 2022-12-15 RX ADMIN — SODIUM CHLORIDE 2000 MILLILITER(S): 9 INJECTION INTRAMUSCULAR; INTRAVENOUS; SUBCUTANEOUS at 23:26

## 2022-12-15 NOTE — ED PROVIDER NOTE - PHYSICAL EXAMINATION
GEN:  Non-toxic appearing, non-distressed, speaking full sentences, non-diaphoretic, AAOx3  HEENT:  NCAT, neck supple, EOMI, PERRLA, sclera anicteric, no conjunctival pallor or injection, no stridor, normal voice, no tonsillar exudate, uvula midline  CV:  regular rhythm and rate, s1/s2 audible, no murmurs, rubs or gallops, peripheral pulses 2+ and symmetric  PULM:  non-labored respirations, lungs clear to auscultation bilaterally, no wheezes, crackles or rales  ABD:  non distended, non-tender, no rebound, no guarding, negative Carr's sign, bowel sounds normal, no cvat  MSK:  no gross deformity, non-tender extremities and joints, range of motion grossly normal appearing, no extremity edema, extremities warm and well perfused   NEURO:  AAOx3, CN II-XII intact, motor 5/5 in upper and lower extremities bilaterally, sensation grossly intact in extremities and trunk, finger to nose testing wnl, no nystagmus, negative Romberg, no pronator drift, no gait deficit  SKIN:  warm, dry, no rash or vesicles   :  right teste swollen, tender, erythematous, no crepitus; tenderness in right inguinal area; perineum normal appearing; cremasteric present b/l     ED Tech Ricardo present during exam

## 2022-12-15 NOTE — HISTORY OF PRESENT ILLNESS
[FreeTextEntry8] : Jamison comes in today at my request as he had called yesterday complaining of lateral hip pain with some swelling.  At that time he described the pain is on the lateral surface and not in the groin.  By the description its sounded like he may be describing a trochanteric bursitis and had an appointment with me scheduled for next week.  Overnight his wife called with concerns that the swelling was enlarging and moving.  Based on this I had him come in today for me to examine the area.  When I asked again about it being on the side he said yes, the inside of my leg and the bulge is now in my scrotum.\par \par He denies constipation and is passing flatus.  No c/o problems with urination.

## 2022-12-15 NOTE — ED PROVIDER NOTE - PATIENT PORTAL LINK FT
You can access the FollowMyHealth Patient Portal offered by [facility] by registering at the following website: [email or web address]. By joining [facility]’s FollowMyHealth portal, you will also be able to view your health information using other applications (apps) compatible with our system.

## 2022-12-15 NOTE — ED PROVIDER NOTE - OBJECTIVE STATEMENT
67-year-old male past medical history of diabetes, hypertension, hyperlipidemia presents with right testicular pain starting 2 days ago.  Pain is sharp and throbbing, constant, radiating to right inguinal area, somewhat alleviated with NSAIDs, associated with swelling and redness.  Patient has never had this before.  Denies fever, chills trauma,, dysuria, penile discharge, painful bowel movements, recent travel, sick contacts.

## 2022-12-15 NOTE — ED PROVIDER NOTE - CLINICAL SUMMARY MEDICAL DECISION MAKING FREE TEXT BOX
67-year-old male past medical history of diabetes, hypertension, hyperlipidemia presents with right testicular pain starting 2 days ago. VSS AF.  Exam consistent with likely orchitis.  Will send labs, ultrasound, urine studies, analgesia.  Dispo pending.

## 2022-12-15 NOTE — PHYSICAL EXAM
[No Acute Distress] : no acute distress [Well Nourished] : well nourished [Well Developed] : well developed [Well-Appearing] : well-appearing [Soft] : abdomen soft [Non Tender] : non-tender [de-identified] : gretchen tanended [de-identified] : Large right-sided irregular scrotal mass c/w stool filled bowel.

## 2022-12-15 NOTE — ED ADULT NURSE NOTE - OBJECTIVE STATEMENT
Pt A&Ox4, ambulatory. PT in NAD, resp equal and unlabored. pt c/o testicular, swelling, warmth and pain x 2 days. PT denies; specific activity leading to pain, discharge from  penis, pain upon urination, hematuria, abd pain, n/v/d, fever/chills, CP, SOB. 20G to R ac.

## 2022-12-15 NOTE — PLAN
[FreeTextEntry1] : 66 yo male with an acute inguinal hernia with pain but no evidence of bowel obstruction at this time.  Discussed need to repair.  Given the acuity, size of the hernia and his c/o intermittent pain will send to the ED for further evaluation and a surgical consult.  ?If should be repaired urgently.  Further management pending the ED evaluation.

## 2022-12-15 NOTE — ED ADULT TRIAGE NOTE - CHIEF COMPLAINT QUOTE
Pt with right groin pain that radiates into testicle area since yesterday. Pt reports swelling to area. Pt denies any urinary symptoms. Pt took motrin 600mg prior to arrival.  fis 222

## 2022-12-16 LAB
ALBUMIN SERPL ELPH-MCNC: 3.7 G/DL — SIGNIFICANT CHANGE UP (ref 3.3–5)
ALP SERPL-CCNC: 48 U/L — SIGNIFICANT CHANGE UP (ref 40–120)
ALT FLD-CCNC: 18 U/L — SIGNIFICANT CHANGE UP (ref 4–41)
ANION GAP SERPL CALC-SCNC: 9 MMOL/L — SIGNIFICANT CHANGE UP (ref 7–14)
AST SERPL-CCNC: 16 U/L — SIGNIFICANT CHANGE UP (ref 4–40)
B-OH-BUTYR SERPL-SCNC: <0 MMOL/L — SIGNIFICANT CHANGE UP (ref 0–0.4)
BILIRUB SERPL-MCNC: 0.4 MG/DL — SIGNIFICANT CHANGE UP (ref 0.2–1.2)
BUN SERPL-MCNC: 15 MG/DL — SIGNIFICANT CHANGE UP (ref 7–23)
CALCIUM SERPL-MCNC: 9.2 MG/DL — SIGNIFICANT CHANGE UP (ref 8.4–10.5)
CHLORIDE SERPL-SCNC: 99 MMOL/L — SIGNIFICANT CHANGE UP (ref 98–107)
CO2 SERPL-SCNC: 27 MMOL/L — SIGNIFICANT CHANGE UP (ref 22–31)
CREAT SERPL-MCNC: 1.12 MG/DL — SIGNIFICANT CHANGE UP (ref 0.5–1.3)
EGFR: 72 ML/MIN/1.73M2 — SIGNIFICANT CHANGE UP
GLUCOSE SERPL-MCNC: 185 MG/DL — HIGH (ref 70–99)
POTASSIUM SERPL-MCNC: 3.6 MMOL/L — SIGNIFICANT CHANGE UP (ref 3.5–5.3)
POTASSIUM SERPL-SCNC: 3.6 MMOL/L — SIGNIFICANT CHANGE UP (ref 3.5–5.3)
PROT SERPL-MCNC: 7 G/DL — SIGNIFICANT CHANGE UP (ref 6–8.3)
SODIUM SERPL-SCNC: 135 MMOL/L — SIGNIFICANT CHANGE UP (ref 135–145)

## 2022-12-16 RX ORDER — LEVOFLOXACIN 5 MG/ML
1 INJECTION, SOLUTION INTRAVENOUS
Qty: 10 | Refills: 0
Start: 2022-12-16 | End: 2022-12-25

## 2022-12-16 RX ORDER — LEVOFLOXACIN 5 MG/ML
500 INJECTION, SOLUTION INTRAVENOUS ONCE
Refills: 0 | Status: DISCONTINUED | OUTPATIENT
Start: 2022-12-16 | End: 2022-12-19

## 2022-12-17 LAB
C TRACH RRNA SPEC QL NAA+PROBE: SIGNIFICANT CHANGE UP
CULTURE RESULTS: SIGNIFICANT CHANGE UP
N GONORRHOEA RRNA SPEC QL NAA+PROBE: SIGNIFICANT CHANGE UP
SPECIMEN SOURCE: SIGNIFICANT CHANGE UP
SPECIMEN SOURCE: SIGNIFICANT CHANGE UP

## 2022-12-27 ENCOUNTER — NON-APPOINTMENT (OUTPATIENT)
Age: 67
End: 2022-12-27

## 2023-01-05 ENCOUNTER — APPOINTMENT (OUTPATIENT)
Dept: UROLOGY | Facility: CLINIC | Age: 68
End: 2023-01-05
Payer: COMMERCIAL

## 2023-01-05 VITALS — HEART RATE: 90 BPM | SYSTOLIC BLOOD PRESSURE: 138 MMHG | DIASTOLIC BLOOD PRESSURE: 92 MMHG

## 2023-01-05 DIAGNOSIS — L81.9 DISORDER OF PIGMENTATION, UNSPECIFIED: ICD-10-CM

## 2023-01-05 PROBLEM — E11.9 TYPE 2 DIABETES MELLITUS WITHOUT COMPLICATIONS: Chronic | Status: ACTIVE | Noted: 2022-12-15

## 2023-01-05 PROCEDURE — 99204 OFFICE O/P NEW MOD 45 MIN: CPT

## 2023-01-06 NOTE — ADDENDUM
[FreeTextEntry1] : Entered by Tone Lora, acting as scribe for Dr. Diego Vicente.\par The documentation recorded by the scribe accurately reflects the service I personally performed and the decisions made by me.

## 2023-01-06 NOTE — LETTER BODY
[FreeTextEntry1] : Cecilia Bragg MD\par [address] [facility] [address]\[place] [address]\par [phone]\par \par Dear Dr. Bragg, \par \par Reason for Visit: Right testicular pain.\par \par This is a 67 year old male with right testicular pain. Patient is referred for evaluation of his condition. Patient has a history of diabetes. He recently went to the emergency room for testicular pain. He has right epididymo orchitis. He is taking Levaquin. He is experiencing persistent symptoms. His previous urine culture was negative for infection. Patient denies any gross hematuria or dysuria or urinary incontinence. The patient denies any aggravating or relieving factors. The patient denies any interference of function. All other review of systems are negative. He has cancer in his family medical history. He has no previous surgical history. Past medical history, family history and social history were inquired and were noncontributory to current condition. The patient does not use tobacco or drink alcohol. Medications and allergies were reviewed. He has no known allergies to medication. \par \par On examination, the patient is a healthy-appearing male in no acute distress. He is alert and oriented follows commands. He has normal mood and affect. He is normocephalic. Oral no thrush. Neck is supple. Respirations are unlabored. His abdomen is soft and nontender. Liver is nonpalpable. Bladder is nonpalpable. No CVA tenderness. Neurologically he is grossly intact. No peripheral edema. Skin without gross abnormality. He has normal male external genitalia. Normal meatus. Bilateral testes are descended intrascrotally and normal to palpation. On rectal examination, there is normal sphincter tone. The prostate is clinically benign without focal induration or nodularity.\par \par Patient's A1c is 7.8. \par \par Assessment: Right testicular pain. Elevated PSA. Poor glycemic control. \par \par I counseled the patient. I discussed the various etiologies of his symptoms. Patient likely has a reactive hydrocele. I encouraged him to take NSAIDS. I recommended he repeat scrotal ultrasound and urine culture. In terms of his elevated PSA, I recommended that he repeat PSA. I encouraged him to obtain urinalysis, BMP and Chlamydia/GC Amplification. In terms of his A1c level, I encouraged him to improve glycemic control. Risks and alternatives were discussed. I answered the patient questions. The patient will follow-up as directed and will contact me with any questions or concerns. Thank you for the opportunity to participate in the care of this patient. I'll keep you updated on his progress.\par \par Plan: Scrotal ultrasound. Urine culture. PSA. BMP. Chlamydia/GC Amplification. Urinalysis. Follow up in 1 month.

## 2023-01-07 LAB
ANION GAP SERPL CALC-SCNC: 11 MMOL/L
APPEARANCE: CLEAR
BACTERIA UR CULT: NORMAL
BACTERIA: NEGATIVE
BILIRUBIN URINE: NEGATIVE
BLOOD URINE: NEGATIVE
BUN SERPL-MCNC: 12 MG/DL
C TRACH RRNA SPEC QL NAA+PROBE: NOT DETECTED
CALCIUM SERPL-MCNC: 9.9 MG/DL
CHLORIDE SERPL-SCNC: 97 MMOL/L
CO2 SERPL-SCNC: 30 MMOL/L
COLOR: NORMAL
CREAT SERPL-MCNC: 1.08 MG/DL
EGFR: 76 ML/MIN/1.73M2
ESTIMATED AVERAGE GLUCOSE: 229 MG/DL
GLUCOSE QUALITATIVE U: NEGATIVE
GLUCOSE SERPL-MCNC: 205 MG/DL
HBA1C MFR BLD HPLC: 9.6 %
HYALINE CASTS: 0 /LPF
KETONES URINE: NEGATIVE
LEUKOCYTE ESTERASE URINE: NEGATIVE
MICROSCOPIC-UA: NORMAL
N GONORRHOEA RRNA SPEC QL NAA+PROBE: NOT DETECTED
NITRITE URINE: NEGATIVE
PH URINE: 6.5
POTASSIUM SERPL-SCNC: 4.4 MMOL/L
PROTEIN URINE: NEGATIVE
PSA FREE FLD-MCNC: 20 %
PSA FREE SERPL-MCNC: 1.16 NG/ML
PSA SERPL-MCNC: 5.77 NG/ML
RED BLOOD CELLS URINE: 1 /HPF
SODIUM SERPL-SCNC: 138 MMOL/L
SOURCE AMPLIFICATION: NORMAL
SPECIFIC GRAVITY URINE: 1.01
SQUAMOUS EPITHELIAL CELLS: 1 /HPF
UROBILINOGEN URINE: NORMAL
WHITE BLOOD CELLS URINE: 0 /HPF

## 2023-01-09 ENCOUNTER — OUTPATIENT (OUTPATIENT)
Dept: OUTPATIENT SERVICES | Facility: HOSPITAL | Age: 68
LOS: 1 days | End: 2023-01-09
Payer: COMMERCIAL

## 2023-01-09 ENCOUNTER — APPOINTMENT (OUTPATIENT)
Dept: ULTRASOUND IMAGING | Facility: CLINIC | Age: 68
End: 2023-01-09
Payer: COMMERCIAL

## 2023-01-09 DIAGNOSIS — N45.2 ORCHITIS: ICD-10-CM

## 2023-01-09 DIAGNOSIS — E11.9 TYPE 2 DIABETES MELLITUS WITHOUT COMPLICATIONS: ICD-10-CM

## 2023-01-09 DIAGNOSIS — R97.20 ELEVATED PROSTATE SPECIFIC ANTIGEN [PSA]: ICD-10-CM

## 2023-01-09 DIAGNOSIS — L81.9 DISORDER OF PIGMENTATION, UNSPECIFIED: ICD-10-CM

## 2023-01-09 PROCEDURE — 76870 US EXAM SCROTUM: CPT | Mod: 26

## 2023-01-09 PROCEDURE — 76870 US EXAM SCROTUM: CPT

## 2023-01-13 ENCOUNTER — APPOINTMENT (OUTPATIENT)
Dept: INTERNAL MEDICINE | Facility: CLINIC | Age: 68
End: 2023-01-13
Payer: COMMERCIAL

## 2023-01-13 VITALS
DIASTOLIC BLOOD PRESSURE: 88 MMHG | WEIGHT: 190 LBS | HEART RATE: 100 BPM | OXYGEN SATURATION: 97 % | SYSTOLIC BLOOD PRESSURE: 128 MMHG | BODY MASS INDEX: 30.17 KG/M2 | HEIGHT: 66.5 IN

## 2023-01-13 PROCEDURE — 99214 OFFICE O/P EST MOD 30 MIN: CPT

## 2023-01-13 NOTE — PHYSICAL EXAM
[de-identified] : WDWN in NAD\par HEENT:  unremarkable\par Neck:  supple, no JVD, no LN\par Lungs:  CTA B/L, no W/R/R\par Heart:  Reg rate, +S1S2, no M/R/G\par Abdomen:  soft, NT, ND, +BS, no masses, no HS-megaly\par Genital: No pubic or genital lesions noted.\par Ext:  no C/C/E\par Neuro:  no focal deficits

## 2023-01-13 NOTE — ASSESSMENT
[FreeTextEntry1] : \par \par # T2DM\par POCT HbA1C   Fingerstick \par Well-controlled at this time. \par To continue to check sugars as directed. \par Reminded of the need for a yearly dilated eye exam. Foot care and daily inspection of feet reiterated.\par Continue take\par

## 2023-01-13 NOTE — HISTORY OF PRESENT ILLNESS
[FreeTextEntry1] : f/up [de-identified] : PATRICK KAMARA  is a 67 year old male  with history of  presented today for DM.\par

## 2023-01-14 LAB
ALBUMIN SERPL ELPH-MCNC: 4.4 G/DL
ALP BLD-CCNC: 58 U/L
ALT SERPL-CCNC: 13 U/L
ANION GAP SERPL CALC-SCNC: 12 MMOL/L
AST SERPL-CCNC: 10 U/L
BASOPHILS # BLD AUTO: 0.07 K/UL
BASOPHILS NFR BLD AUTO: 0.6 %
BILIRUB SERPL-MCNC: 0.4 MG/DL
BUN SERPL-MCNC: 10 MG/DL
CALCIUM SERPL-MCNC: 10.3 MG/DL
CHLORIDE SERPL-SCNC: 99 MMOL/L
CHOLEST SERPL-MCNC: 95 MG/DL
CO2 SERPL-SCNC: 30 MMOL/L
CREAT SERPL-MCNC: 1.03 MG/DL
EGFR: 80 ML/MIN/1.73M2
EOSINOPHIL # BLD AUTO: 0.09 K/UL
EOSINOPHIL NFR BLD AUTO: 0.8 %
ESTIMATED AVERAGE GLUCOSE: 235 MG/DL
FOLATE SERPL-MCNC: 11.4 NG/ML
GLUCOSE SERPL-MCNC: 168 MG/DL
HBA1C MFR BLD HPLC: 9.8 %
HCT VFR BLD CALC: 45.2 %
HDLC SERPL-MCNC: 37 MG/DL
HGB BLD-MCNC: 14.1 G/DL
IMM GRANULOCYTES NFR BLD AUTO: 0.4 %
LDLC SERPL CALC-MCNC: 41 MG/DL
LYMPHOCYTES # BLD AUTO: 3.37 K/UL
LYMPHOCYTES NFR BLD AUTO: 30.1 %
MAN DIFF?: NORMAL
MCHC RBC-ENTMCNC: 27.9 PG
MCHC RBC-ENTMCNC: 31.2 GM/DL
MCV RBC AUTO: 89.3 FL
MONOCYTES # BLD AUTO: 0.54 K/UL
MONOCYTES NFR BLD AUTO: 4.8 %
NEUTROPHILS # BLD AUTO: 7.1 K/UL
NEUTROPHILS NFR BLD AUTO: 63.3 %
NONHDLC SERPL-MCNC: 58 MG/DL
PLATELET # BLD AUTO: 304 K/UL
POTASSIUM SERPL-SCNC: 4.5 MMOL/L
PROT SERPL-MCNC: 7.7 G/DL
PSA SERPL-MCNC: 5.68 NG/ML
RBC # BLD: 5.06 M/UL
RBC # FLD: 13 %
SODIUM SERPL-SCNC: 141 MMOL/L
T4 FREE SERPL-MCNC: 1.3 NG/DL
TRIGL SERPL-MCNC: 87 MG/DL
TSH SERPL-ACNC: 1.45 UIU/ML
VIT B12 SERPL-MCNC: 478 PG/ML
WBC # FLD AUTO: 11.21 K/UL

## 2023-01-16 NOTE — ASSESSMENT
[FreeTextEntry1] : PATRICK KAMARA  is a 67 year old male  with history of diabetes, hypertension, Elevated PSA, obesity and hyperlipidemia  presented today for high A1C. \par \par # T2DM\par poorly-controlled at this time. \par Eye:Last seen by ophthalmology in 5/2022. was told no retinopathy.\par Foot: denied any issues.\par Jardiance or Trulicity will be good choice for DM, obesity and CV benefit but not sure if insurance covers.\par He is open for weekly injection with GLP1 RA.\par Sent Rx for Glipizide 5mg po bid as low risk of hypoglycemia and low financial burden.\par Pt should check home BG at least 2 times a day including FBG and 2 hour PP GB. \par Continue take Metformin 1000mg po bid\par Will refer to our dietician for food choice. \par check all routine lab as overdue CPE. \par \par # HTN\par BP is acceptable 128/88.\par Continue current management including low salt diet and regular exercise.\par Take Enalapril 10mg qd\par \par Sent refill for all regular medications.\par To see him in 1 week via TEB for f/up.

## 2023-01-16 NOTE — HISTORY OF PRESENT ILLNESS
[FreeTextEntry8] : PATRICK KAMARA  is a 67 year old male  with history of diabetes, hypertension, Elevated PSA, obesity  and hyperlipidemia  presented today for high A1C. \par \par Reviewed note by Dr. Vicente, URO on 1/5/23.\par Dr. Vicente ordered MR prostate w/wo IV C for increased PSA( 5.77) and told him he should decrease his A1C level before the MRI.\par \par He has been taking Metformin 1000mg po bid. This morning's home fasting BG was 173. Denied polyuria or polydipsia. \par He has been non-compliant to taking medications by Dr Bragg's previous note.\par As per pt, Dr. Bragg ordered Jardiance but pt could not fill it due to his medical insurance did not approve it. \par HgbA1C : 1/5/23 9.6 <---11/16/22 7.8\par Pt requested all medication refill. \par Denied fever, chills,CP, SOB, abdominal pain, n/v/c/d.

## 2023-01-17 ENCOUNTER — RESULT REVIEW (OUTPATIENT)
Age: 68
End: 2023-01-17

## 2023-01-17 ENCOUNTER — OUTPATIENT (OUTPATIENT)
Dept: OUTPATIENT SERVICES | Facility: HOSPITAL | Age: 68
LOS: 1 days | End: 2023-01-17
Payer: COMMERCIAL

## 2023-01-17 ENCOUNTER — APPOINTMENT (OUTPATIENT)
Dept: MRI IMAGING | Facility: IMAGING CENTER | Age: 68
End: 2023-01-17
Payer: COMMERCIAL

## 2023-01-17 DIAGNOSIS — E11.9 TYPE 2 DIABETES MELLITUS WITHOUT COMPLICATIONS: ICD-10-CM

## 2023-01-17 PROCEDURE — 76498 UNLISTED MR PROCEDURE: CPT

## 2023-01-17 PROCEDURE — 76498P: CUSTOM | Mod: 26

## 2023-01-17 PROCEDURE — 72197 MRI PELVIS W/O & W/DYE: CPT | Mod: 26

## 2023-01-17 PROCEDURE — A9585: CPT

## 2023-01-17 PROCEDURE — 72197 MRI PELVIS W/O & W/DYE: CPT

## 2023-01-20 ENCOUNTER — APPOINTMENT (OUTPATIENT)
Dept: INTERNAL MEDICINE | Facility: CLINIC | Age: 68
End: 2023-01-20

## 2023-01-27 ENCOUNTER — NON-APPOINTMENT (OUTPATIENT)
Age: 68
End: 2023-01-27

## 2023-01-27 ENCOUNTER — APPOINTMENT (OUTPATIENT)
Dept: INTERNAL MEDICINE | Facility: CLINIC | Age: 68
End: 2023-01-27
Payer: COMMERCIAL

## 2023-01-27 VITALS
WEIGHT: 192 LBS | DIASTOLIC BLOOD PRESSURE: 70 MMHG | HEART RATE: 89 BPM | OXYGEN SATURATION: 97 % | HEIGHT: 66.5 IN | SYSTOLIC BLOOD PRESSURE: 120 MMHG | BODY MASS INDEX: 30.49 KG/M2

## 2023-01-27 DIAGNOSIS — Z87.19 PERSONAL HISTORY OF OTHER DISEASES OF THE DIGESTIVE SYSTEM: ICD-10-CM

## 2023-01-27 DIAGNOSIS — Z13.31 ENCOUNTER FOR SCREENING FOR DEPRESSION: ICD-10-CM

## 2023-01-27 DIAGNOSIS — Z13.39 ENCOUNTER FOR SCREENING EXAM FOR OTHER MENTAL HEALTH AND BEHAVIORAL DISORDERS: ICD-10-CM

## 2023-01-27 LAB — HBA1C MFR BLD HPLC: 9.6

## 2023-01-27 PROCEDURE — 99397 PER PM REEVAL EST PAT 65+ YR: CPT | Mod: 25

## 2023-01-27 PROCEDURE — G0444 DEPRESSION SCREEN ANNUAL: CPT | Mod: 59

## 2023-01-27 PROCEDURE — G0442 ANNUAL ALCOHOL SCREEN 15 MIN: CPT

## 2023-01-27 PROCEDURE — 93000 ELECTROCARDIOGRAM COMPLETE: CPT | Mod: 59

## 2023-01-27 RX ORDER — SODIUM PHOSPHATE, DIBASIC AND SODIUM PHOSPHATE, MONOBASIC 7; 19 G/230ML; G/230ML
ENEMA RECTAL
Qty: 1 | Refills: 0 | Status: DISCONTINUED | COMMUNITY
Start: 2023-01-07 | End: 2023-01-27

## 2023-01-27 NOTE — HEALTH RISK ASSESSMENT
[Very Good] : ~his/her~  mood as very good [Never] : Never [No] : In the past 12 months have you used drugs other than those required for medical reasons? No [No falls in past year] : Patient reported no falls in the past year [0] : 2) Feeling down, depressed, or hopeless: Not at all (0) [PHQ-2 Negative - No further assessment needed] : PHQ-2 Negative - No further assessment needed [de-identified] : Occasional [de-identified] : poor [de-identified] : No regular exercise [[id number]] : 0 [EyeExamDate] : did not complete [Patient reported colonoscopy was normal] : Patient reported colonoscopy was normal [Change in mental status noted] : No change in mental status noted [Language] : denies difficulty with language [Behavior] : denies difficulty with behavior [Learning/Retaining New Information] : denies difficulty learning/retaining new information [Handling Complex Tasks] : denies difficulty handling complex tasks [Reasoning] : denies difficulty with reasoning [Spatial Ability and Orientation] : denies difficulty with spatial ability and orientation [With Significant Other] : lives with significant other [Employed] : employed [] :  [Feels Safe at Home] : Feels safe at home [Fully functional (bathing, dressing, toileting, transferring, walking, feeding)] : Fully functional (bathing, dressing, toileting, transferring, walking, feeding) [Fully functional (using the telephone, shopping, preparing meals, housekeeping, doing laundry, using] : Fully functional and needs no help or supervision to perform IADLs (using the telephone, shopping, preparing meals, housekeeping, doing laundry, using transportation, managing medications and managing finances) [Reports changes in hearing] : Reports no changes in hearing [Reports changes in vision] : Reports no changes in vision [Smoke Detector] : smoke detector [Carbon Monoxide Detector] : carbon monoxide detector [Guns at Home] : no guns at home [Seat Belt] :  uses seat belt [ColonoscopyComments] : Done approximately 4 years ago but patient was unsure.  Was told to follow-up in 5 years [ColonoscopyDate] : 01/19 [FreeTextEntry2] : works in the warehouse at  [employer] [With Patient/Caregiver] : , with patient/caregiver [Name: ___] : Health Care Proxy's Name: [unfilled]  [Relationship: ___] : Relationship: [unfilled] [AdvancecareDate] : 01/23 [FreeTextEntry4] : Has not done an official HCP but names his wife.  REc he complete paperwork.

## 2023-01-27 NOTE — HISTORY OF PRESENT ILLNESS
[de-identified] : The patient is a 67-year-old male with history of diabetes, hypertension, Elevated PSA and hyperlipidemia who presents to the office today for a comprehensive evaluation.   Patient was recently seen several times related to right testicular swelling and an elevated hemoglobin A1c prior to getting an MRI of his prostate.  The patient has been noncompliant with his medications for his diabetes but tells me he is taking the metformin, Jardiance and glipizide at this time.  He tells me that his sugars are decreasing but could not give me exact numbers.  He underwent MRI of the prostate which I was able to review for him that showed low probability of disease.  He continues to have right testicular swelling which I felt was due to an inguinal hernia based on the history of a mass coming down from the right hip side and diagonally across into the testicle but ultrasound showed a hydrocele and orchitis.  He was treated for the orchitis but the hydrocele still persists.  He does have a follow-up with urology early next month.

## 2023-01-27 NOTE — ASSESSMENT
[FreeTextEntry1] : 1.  General health maintenance -reports that he had a colonoscopy but unsure of date.  He will find out when he is due.  TdaP and pneumovax are UTD.  He had his COVID series.   Did get a flu shot this past fall.  Depression screening - a PHQ2 was administered to the patient and reviewed at the time of the visit.  The PHQ2 was negative and no further action is required.  Will rescreen in 1 year or prn.  SBIRT was done and was negative.\par 2.  Diabetes -is working on getting his sugars under better control.  Tolerating glipizide.  Will need a repeat hemoglobin A1c in approximately 2-1/2 to 3 months.  Reminded of need for an eye visit.\par 3.  Hypertension -BP is controlled, continue current management\par 4.  Hyperlipidemia on atorvastatin.  LDL done recently was 41.\par 5.  Follow-up with urology regarding his elevated PSA and to go over his MRI of prostate in depth as well as to follow-up on the right hydrocele.\par 6.  Follow-up in 3 months or as needed

## 2023-02-09 ENCOUNTER — NON-APPOINTMENT (OUTPATIENT)
Age: 68
End: 2023-02-09

## 2023-02-13 ENCOUNTER — APPOINTMENT (OUTPATIENT)
Dept: UROLOGY | Facility: CLINIC | Age: 68
End: 2023-02-13
Payer: COMMERCIAL

## 2023-02-13 DIAGNOSIS — L81.8 OTHER SPECIFIED DISORDERS OF PIGMENTATION: ICD-10-CM

## 2023-02-13 DIAGNOSIS — N45.2 ORCHITIS: ICD-10-CM

## 2023-02-13 LAB
APPEARANCE: CLEAR
BACTERIA: NEGATIVE
BILIRUBIN URINE: NEGATIVE
BLOOD URINE: NEGATIVE
COLOR: NORMAL
GLUCOSE QUALITATIVE U: ABNORMAL
HYALINE CASTS: 0 /LPF
KETONES URINE: NEGATIVE
LEUKOCYTE ESTERASE URINE: NEGATIVE
MICROSCOPIC-UA: NORMAL
NITRITE URINE: NEGATIVE
PH URINE: 6
PROTEIN URINE: NORMAL
RED BLOOD CELLS URINE: 1 /HPF
SPECIFIC GRAVITY URINE: 1.04
SQUAMOUS EPITHELIAL CELLS: 1 /HPF
UROBILINOGEN URINE: NORMAL
WHITE BLOOD CELLS URINE: 1 /HPF

## 2023-02-13 PROCEDURE — 99214 OFFICE O/P EST MOD 30 MIN: CPT

## 2023-02-13 RX ORDER — SULFAMETHOXAZOLE AND TRIMETHOPRIM 800; 160 MG/1; MG/1
800-160 TABLET ORAL
Qty: 14 | Refills: 0 | Status: ACTIVE | COMMUNITY
Start: 2023-02-13 | End: 1900-01-01

## 2023-02-13 NOTE — LETTER BODY
[FreeTextEntry1] : Cecilia Bragg MD\par [address] [facility] [address]\[place] [address]\par [phone]\par \par Dear Dr. Bragg, \par \par Reason for Visit: Right testicular pain.  Right epididymal orchitis.  Elevated PSA.\par \par This is a 68 year old male with right testicular pain. Patient has a history of poorly controlled diabetes. He previously went to the emergency room for testicular pain. He has right epididymo orchitis. He completed his course of Levaquin. He is experiencing persistent symptoms. His previous urine culture was negative for infection. He is here today for a follow up. He reports persistent discomfort. His previous prostate MRI was negative. Patient denies any gross hematuria or dysuria or urinary incontinence. The patient denies any aggravating or relieving factors. The patient denies any interference of function. All other review of systems are negative. He has cancer in his family medical history. He has no previous surgical history. Past medical history, family history and social history were inquired and were noncontributory to current condition. The patient does not use tobacco or drink alcohol. Medications and allergies were reviewed. He has no known allergies to medication. \par \par On examination, the patient is a healthy-appearing male in no acute distress. He is alert and oriented follows commands. He has normal mood and affect. He is normocephalic. Oral no thrush. Neck is supple. Respirations are unlabored. His abdomen is soft and nontender. Liver is nonpalpable. Bladder is nonpalpable. No CVA tenderness. Neurologically he is grossly intact. No peripheral edema. Skin without gross abnormality. He has normal male external genitalia. Normal meatus. Bilateral testes are descended intrascrotally and normal to palpation.  He has enlarged right hemiscrotum.  On rectal examination, there is normal sphincter tone. The prostate is clinically benign without focal induration or nodularity.\par \par Patient's A1c is 9.8. \par \par I personally reviewed the MRI  scan with patient today. MRI scan demonstrated PIRAD 2\par \par Previous scrotal ultrasound was consistent with right epidural orchitis.\par \par Assessment: Right testicular pain. Elevated PSA. Poor glycemic control. \par \par I counseled the patient. In terms of his poorly controlled diabetes, I encouraged him to improve glycemic control. In terms of his elevated PSA, his recent prostate MRI was unremarkable.  I encouraged him to obtain urinalysis and urine culture. In terms of his persistent discomfort, I recommended he begin a trial of Bactrim for 1 week. Risks and alternatives were discussed. I answered the patient questions. The patient will follow-up as directed and will contact me with any questions or concerns. Thank you for the opportunity to participate in the care of this patient. I'll keep you updated on his progress.\par \par Plan: Bactrim 1 week. Urine culture. Urinalysis. PSA. BMP.  Follow up in 1 month.

## 2023-02-14 LAB — BACTERIA UR CULT: NORMAL

## 2023-03-16 ENCOUNTER — APPOINTMENT (OUTPATIENT)
Dept: UROLOGY | Facility: CLINIC | Age: 68
End: 2023-03-16
Payer: COMMERCIAL

## 2023-03-16 DIAGNOSIS — N52.9 MALE ERECTILE DYSFUNCTION, UNSPECIFIED: ICD-10-CM

## 2023-03-16 DIAGNOSIS — Z23 ENCOUNTER FOR IMMUNIZATION: ICD-10-CM

## 2023-03-16 DIAGNOSIS — Z00.00 ENCOUNTER FOR GENERAL ADULT MEDICAL EXAMINATION W/OUT ABNORMAL FINDINGS: ICD-10-CM

## 2023-03-16 LAB
APPEARANCE: CLEAR
BACTERIA: NEGATIVE
BILIRUBIN URINE: NEGATIVE
BLOOD URINE: NEGATIVE
COLOR: COLORLESS
ESTIMATED AVERAGE GLUCOSE: 171 MG/DL
GLUCOSE QUALITATIVE U: ABNORMAL
HBA1C MFR BLD HPLC: 7.6 %
HYALINE CASTS: 0 /LPF
KETONES URINE: NEGATIVE
LEUKOCYTE ESTERASE URINE: NEGATIVE
MICROSCOPIC-UA: NORMAL
NITRITE URINE: NEGATIVE
PH URINE: 7
PROTEIN URINE: NEGATIVE
RED BLOOD CELLS URINE: 0 /HPF
SPECIFIC GRAVITY URINE: 1.01
SQUAMOUS EPITHELIAL CELLS: 0 /HPF
UROBILINOGEN URINE: NORMAL
WHITE BLOOD CELLS URINE: 0 /HPF

## 2023-03-16 PROCEDURE — 99214 OFFICE O/P EST MOD 30 MIN: CPT

## 2023-03-16 NOTE — LETTER BODY
[FreeTextEntry1] : Cecilia Bragg MD\par [address] [facility] [address]\[place] [address]\par [phone]\par \par Dear Dr. Bragg, \par \par Reason for Visit: Right testicular pain.  Right epididymal orchitis.  Elevated PSA.\par \par This is a 68 year old male with right testicular pain. Patient has a history of poorly controlled diabetes. He previously went to the emergency room for testicular pain. He has right epididymo orchitis. He previously completed his course of Levaquin. His previous prostate MRI was negative. He finished his course of Bactrim. His previous urine culture was negative for infection. He is here today for a follow up. Since he was last seen, his orchitis has improved. He has a reactive hydrocele. Patient denies any gross hematuria or dysuria or urinary incontinence. The patient denies any aggravating or relieving factors. The patient denies any interference of function. All other review of systems are negative. He has cancer in his family medical history. He has no previous surgical history. Past medical history, family history and social history were inquired and were noncontributory to current condition. The patient does not use tobacco or drink alcohol. Medications and allergies were reviewed. He has no known allergies to medication. \par \par On examination, the patient is a well-appearing male in no acute distress. He is alert and oriented follows commands. He has normal mood and affect. He is normocephalic. Oral no thrush. Neck is supple. Respirations are unlabored. His abdomen is soft and nontender. Liver is nonpalpable. Bladder is nonpalpable. No CVA tenderness. Neurologically he is grossly intact. No peripheral edema. Skin without gross abnormality. He has normal male external genitalia. Normal meatus. Bilateral testes are descended intrascrotally and normal to palpation.  He has enlarged right hemiscrotum.  On rectal examination, there is normal sphincter tone. The prostate is clinically benign without focal induration or nodularity. He has a reactive hydrocele.\par \par His glucose level is down to 110. \par \par Assessment: Right testicular pain. Elevated PSA. Poor glycemic control. Hydrocele.\par \par I counseled the patient. In terms of his poorly controlled diabetes, I encouraged him to continue improve glycemic control. He will repeat A1c today. In terms of his elevated PSA, his recent prostate MRI was unremarkable.  I encouraged him to obtain urinalysis and urine culture. In terms of his hydrocele, I recommended that he repeat his testicular ultrasound. Risks and alternatives were discussed. I answered the patient questions. The patient will follow-up as directed and will contact me with any questions or concerns. Thank you for the opportunity to participate in the care of this patient. I'll keep you updated on his progress.\par \par Plan: Testicular ultrasound. Urine culture. Urinalysis. A1c. Follow up as directed.

## 2023-03-17 ENCOUNTER — APPOINTMENT (OUTPATIENT)
Dept: ULTRASOUND IMAGING | Facility: IMAGING CENTER | Age: 68
End: 2023-03-17
Payer: COMMERCIAL

## 2023-03-17 ENCOUNTER — OUTPATIENT (OUTPATIENT)
Dept: OUTPATIENT SERVICES | Facility: HOSPITAL | Age: 68
LOS: 1 days | End: 2023-03-17
Payer: COMMERCIAL

## 2023-03-17 DIAGNOSIS — Z00.00 ENCOUNTER FOR GENERAL ADULT MEDICAL EXAMINATION WITHOUT ABNORMAL FINDINGS: ICD-10-CM

## 2023-03-17 PROCEDURE — 76870 US EXAM SCROTUM: CPT | Mod: 26

## 2023-03-17 PROCEDURE — 76870 US EXAM SCROTUM: CPT

## 2023-03-18 LAB — BACTERIA UR CULT: NORMAL

## 2023-03-20 ENCOUNTER — APPOINTMENT (OUTPATIENT)
Dept: INTERNAL MEDICINE | Facility: CLINIC | Age: 68
End: 2023-03-20
Payer: COMMERCIAL

## 2023-03-20 VITALS
BODY MASS INDEX: 30.97 KG/M2 | DIASTOLIC BLOOD PRESSURE: 82 MMHG | WEIGHT: 195 LBS | HEART RATE: 81 BPM | SYSTOLIC BLOOD PRESSURE: 180 MMHG | HEIGHT: 66.5 IN | OXYGEN SATURATION: 96 %

## 2023-03-20 DIAGNOSIS — E78.5 HYPERLIPIDEMIA, UNSPECIFIED: ICD-10-CM

## 2023-03-20 PROCEDURE — 99214 OFFICE O/P EST MOD 30 MIN: CPT

## 2023-03-20 NOTE — HISTORY OF PRESENT ILLNESS
[de-identified] : 68-year-old male with history of diabetes, hypertension, hyperlipidemia and right hydrocele likely as a consequence of epididymitis here for follow-up of the same.  Saw urology on Friday who did an ultrasound of the testicles that shows a moderate hydrocele which is an improvement.  Patient states that it is decreasing in size slowly.  The urologist also did his A1c which was down to 7.6 only 2 months after his last 1 which was markedly elevated.  He is feeling well but does complain of left shoulder pain with decreased range of motion.  He does a lot of driving and finds that it is very bothersome.  As he was moving his shoulder I heard a clunk from across the room.

## 2023-03-20 NOTE — ASSESSMENT
[FreeTextEntry1] : 1.  DM -  Controlled at this time with hemoglobin decreased to 7.6 over 2 months.  Anticipated will continue to decline..  To continue to check sugars as directed.  Reminded of the need for a yearly dilated eye exam.  Foot care and daily inspection of feet reiterated.  Has an Optho appointment set up for next month.\par 2.  HTN - BP is acceptable.  Continue current management.\par 3.  Hyperlipidemia - tolerating a statin well.  Continue current management.  Last LDL was 44, at goal.\par 4.  Left shoulder impingement -orthopedic referral as he may benefit from a steroid injection.  He will just need to watch his sugars closely.\par 5.  Hydrocele, improved.  Urology notes read and appreciated.  Reminded to continue to keep the testicles elevated when sitting or laying.\par 6.  Follow-up in 3 months or as needed\par

## 2023-04-27 ENCOUNTER — APPOINTMENT (OUTPATIENT)
Dept: ORTHOPEDIC SURGERY | Facility: CLINIC | Age: 68
End: 2023-04-27
Payer: COMMERCIAL

## 2023-04-27 DIAGNOSIS — S46.011S STRAIN OF MUSCLE(S) AND TENDON(S) OF THE ROTATOR CUFF OF RIGHT SHOULDER, SEQUELA: ICD-10-CM

## 2023-04-27 DIAGNOSIS — M19.019 PRIMARY OSTEOARTHRITIS, UNSPECIFIED SHOULDER: ICD-10-CM

## 2023-04-27 PROCEDURE — 73030 X-RAY EXAM OF SHOULDER: CPT | Mod: LT

## 2023-04-27 PROCEDURE — 99204 OFFICE O/P NEW MOD 45 MIN: CPT

## 2023-05-26 ENCOUNTER — APPOINTMENT (OUTPATIENT)
Dept: MRI IMAGING | Facility: IMAGING CENTER | Age: 68
End: 2023-05-26
Payer: COMMERCIAL

## 2023-05-26 ENCOUNTER — OUTPATIENT (OUTPATIENT)
Dept: OUTPATIENT SERVICES | Facility: HOSPITAL | Age: 68
LOS: 1 days | End: 2023-05-26
Payer: COMMERCIAL

## 2023-05-26 DIAGNOSIS — Z00.8 ENCOUNTER FOR OTHER GENERAL EXAMINATION: ICD-10-CM

## 2023-05-26 DIAGNOSIS — Z12.5 ENCOUNTER FOR SCREENING FOR MALIGNANT NEOPLASM OF PROSTATE: ICD-10-CM

## 2023-05-26 PROCEDURE — 73221 MRI JOINT UPR EXTREM W/O DYE: CPT | Mod: 26,LT

## 2023-05-26 PROCEDURE — 73221 MRI JOINT UPR EXTREM W/O DYE: CPT

## 2023-05-31 ENCOUNTER — NON-APPOINTMENT (OUTPATIENT)
Age: 68
End: 2023-05-31

## 2023-06-27 ENCOUNTER — APPOINTMENT (OUTPATIENT)
Dept: INTERNAL MEDICINE | Facility: CLINIC | Age: 68
End: 2023-06-27
Payer: COMMERCIAL

## 2023-06-27 VITALS
HEART RATE: 86 BPM | WEIGHT: 197 LBS | DIASTOLIC BLOOD PRESSURE: 70 MMHG | SYSTOLIC BLOOD PRESSURE: 110 MMHG | OXYGEN SATURATION: 97 % | BODY MASS INDEX: 31.29 KG/M2 | HEIGHT: 66.5 IN

## 2023-06-27 PROCEDURE — 99214 OFFICE O/P EST MOD 30 MIN: CPT

## 2023-06-29 ENCOUNTER — APPOINTMENT (OUTPATIENT)
Dept: UROLOGY | Facility: CLINIC | Age: 68
End: 2023-06-29
Payer: COMMERCIAL

## 2023-06-29 ENCOUNTER — OUTPATIENT (OUTPATIENT)
Dept: OUTPATIENT SERVICES | Facility: HOSPITAL | Age: 68
LOS: 1 days | End: 2023-06-29
Payer: COMMERCIAL

## 2023-06-29 ENCOUNTER — APPOINTMENT (OUTPATIENT)
Dept: ULTRASOUND IMAGING | Facility: IMAGING CENTER | Age: 68
End: 2023-06-29
Payer: COMMERCIAL

## 2023-06-29 DIAGNOSIS — R97.20 ELEVATED PROSTATE SPECIFIC ANTIGEN [PSA]: ICD-10-CM

## 2023-06-29 DIAGNOSIS — N43.3 HYDROCELE, UNSPECIFIED: ICD-10-CM

## 2023-06-29 PROCEDURE — 76870 US EXAM SCROTUM: CPT

## 2023-06-29 PROCEDURE — 76870 US EXAM SCROTUM: CPT | Mod: 26

## 2023-06-29 PROCEDURE — 99214 OFFICE O/P EST MOD 30 MIN: CPT

## 2023-06-29 NOTE — ADDENDUM
[FreeTextEntry1] : Entered by Mi Ferraro, acting as scribe for Dr. Diego Vicente.\par The documentation recorded by the scribe accurately reflects the service I personally performed and the decisions made by me.

## 2023-06-29 NOTE — HISTORY OF PRESENT ILLNESS
[FreeTextEntry1] : Patient follow-up for right hip and orchitis.  The testicle pain improved.  He has reactive right hydrocele.  Patient reports persistent symptoms.  Recommend repeat urine culture and repeat scrotal ultrasound.  Consider aspiration of the hydrocele versus hydrocelectomy.\par \par Patient has elevated PSA.  Repeat PSA is 5.58.  Patient had previous prostate MRI in January.  MRI demonstrated PI-RADS 2 lesion.  Reassured patient.  PSA stable.  Previous prostate MRI demonstrated PI-RADS 2.  His PSA density is appropriate.  Repeat PSA 1 year \par \par Please refer to URO Consult note.

## 2023-06-29 NOTE — LETTER BODY
[FreeTextEntry1] : Cecilia Bragg MD\par [address] [facility] [address]\[place] [address]\par [phone]\par \par Dear Dr. Bragg, \par \par Reason for Visit: Right testicular pain.  Right epididymal orchitis.  Right hydrocele.  Elevated PSA.\par \par This is a 68 year old male with right testicular pain. Patient has a history of poorly controlled diabetes. He previously went to the emergency room for testicular pain. He has right epididymo orchitis. He previously completed his course of Levaquin. His previous prostate MRI was negative. He finished his course of Bactrim. His previous urine culture was negative for infection. He is here today for a follow up. Since he was last seen, his testicular pain has improved. His orchitis has improved. He has a reactive hydrocele, and reports persistent symptoms. Patient has elevated PSA. Repeat PSA is 5.58.  Patient had previous prostate MRI in January.  MRI demonstrated PI-RADS 2 lesion.  Reassured patient.  PSA stable.  Previous prostate MRI demonstrated PI-RADS 2.  His PSA density is appropriate.  Patient denies any gross hematuria or dysuria or urinary incontinence. The patient denies any aggravating or relieving factors. The patient denies any interference of function. All other review of systems are negative. He has cancer in his family medical history. He has no previous surgical history. Past medical history, family history and social history were inquired and were noncontributory to current condition. The patient does not use tobacco or drink alcohol. Medications and allergies were reviewed. He has no known allergies to medication. \par \par On examination, the patient is a well-appearing male in no acute distress. He is alert and oriented follows commands. He has normal mood and affect. He is normocephalic. Oral no thrush. Neck is supple. Respirations are unlabored. His abdomen is soft and nontender. Liver is nonpalpable. Bladder is nonpalpable. No CVA tenderness. Neurologically he is grossly intact. No peripheral edema. Skin without gross abnormality. He has normal male external genitalia. Normal meatus. Bilateral testes are descended intrascrotally and normal to palpation.  He has enlarged right hemiscrotum.  On rectal examination, there is normal sphincter tone. The prostate is clinically benign without focal induration or nodularity. He has a reactive hydrocele.\par \par His glucose level is down to 98. \par \par Scrotal sono today demonstrated persistent right hydrocele.   \par \par Assessment: Right testicular pain. Elevated PSA. Poor glycemic control. Hydrocele.\par \par I counseled the patient. In terms of his poorly controlled diabetes, I encouraged him to continue improve glycemic control. He will repeat A1c today. In terms of his elevated PSA, his recent prostate MRI was unremarkable.  I encouraged him to obtain urinalysis and urine culture. In terms of his hydrocele, I recommended that he repeat his testicular ultrasound.  Ultrasound today demonstrated he has persistent right hydrocele.  Patient is symptomatic I discussed option of aspiration of hydrocele as well as hydrocelectomy.  Patient declined surgery.  We will proceed with aspiration hydrocele.  He understands the high incidence of hydrocele recurrence after aspiration.  I counseled the patient regarding the procedure. The risks and benefits were discussed. Alternatives were given. I answered the patient questions. The patient will take the necessary preparations for the procedure. The patient will follow-up as directed and will contact me with any questions or concerns.  Thank you for the opportunity to participate in the care of this patient. I'll keep you updated on his  progress.\par \par Plan: Aspiration of right hydrocele.  PSA.  Follow-up as directed.

## 2023-07-01 LAB
APPEARANCE: CLEAR
BACTERIA UR CULT: NORMAL
BACTERIA: NEGATIVE /HPF
BILIRUBIN URINE: NEGATIVE
BLOOD URINE: NEGATIVE
CAST: 0 /LPF
COLOR: YELLOW
EPITHELIAL CELLS: 0 /HPF
GLUCOSE QUALITATIVE U: >=1000 MG/DL
KETONES URINE: NEGATIVE MG/DL
LEUKOCYTE ESTERASE URINE: NEGATIVE
MICROSCOPIC-UA: NORMAL
NITRITE URINE: NEGATIVE
PH URINE: 6.5
PROTEIN URINE: NEGATIVE MG/DL
RED BLOOD CELLS URINE: 0 /HPF
SPECIFIC GRAVITY URINE: 1.03
UROBILINOGEN URINE: 0.2 MG/DL
WHITE BLOOD CELLS URINE: 0 /HPF

## 2023-07-05 ENCOUNTER — APPOINTMENT (OUTPATIENT)
Dept: ORTHOPEDIC SURGERY | Facility: CLINIC | Age: 68
End: 2023-07-05
Payer: COMMERCIAL

## 2023-07-05 VITALS — HEIGHT: 67 IN | WEIGHT: 195 LBS | BODY MASS INDEX: 30.61 KG/M2

## 2023-07-05 DIAGNOSIS — M75.42 IMPINGEMENT SYNDROME OF LEFT SHOULDER: ICD-10-CM

## 2023-07-05 LAB
ALBUMIN SERPL ELPH-MCNC: 4.5 G/DL
ALP BLD-CCNC: 51 U/L
ALT SERPL-CCNC: 33 U/L
ANION GAP SERPL CALC-SCNC: 10 MMOL/L
AST SERPL-CCNC: 23 U/L
BILIRUB SERPL-MCNC: 0.4 MG/DL
BUN SERPL-MCNC: 12 MG/DL
CALCIUM SERPL-MCNC: 10 MG/DL
CHLORIDE SERPL-SCNC: 103 MMOL/L
CHOLEST SERPL-MCNC: 105 MG/DL
CO2 SERPL-SCNC: 28 MMOL/L
CREAT SERPL-MCNC: 1.14 MG/DL
EGFR: 70 ML/MIN/1.73M2
ESTIMATED AVERAGE GLUCOSE: 171 MG/DL
GLUCOSE SERPL-MCNC: 98 MG/DL
HBA1C MFR BLD HPLC: 7.6 %
HDLC SERPL-MCNC: 50 MG/DL
LDLC SERPL CALC-MCNC: 43 MG/DL
NONHDLC SERPL-MCNC: 55 MG/DL
POTASSIUM SERPL-SCNC: 4.6 MMOL/L
PROT SERPL-MCNC: 7.3 G/DL
PSA FREE FLD-MCNC: 25 %
PSA FREE SERPL-MCNC: 1.41 NG/ML
PSA SERPL-MCNC: 5.58 NG/ML
SODIUM SERPL-SCNC: 141 MMOL/L
TRIGL SERPL-MCNC: 59 MG/DL

## 2023-07-05 PROCEDURE — 99213 OFFICE O/P EST LOW 20 MIN: CPT | Mod: 25

## 2023-07-05 PROCEDURE — 20610 DRAIN/INJ JOINT/BURSA W/O US: CPT | Mod: LT

## 2023-07-05 NOTE — DISCUSSION/SUMMARY
[de-identified] : I went over the pathophysiology of the patient's symptoms in great detail with the patient. I went over the patient's MRI results with them in great detail and used models to aid in my explanation. The patient elected to receive a cortisone injection into his left shoulder today, and tolerated it well. I instructed the patient on ROM exercises, and told them to take it easy. The use of ice and rest was reviewed with the patient. The patient may resume activities tomorrow. At-home strengthening exercises were discussed and demonstrated with the patient. We discussed the use of ice, Tylenol and anti-inflammatories to relieve pain. \par \par All of their questions were answered. They understand and consent to the plan. \par \par FU in one month.

## 2023-07-05 NOTE — HISTORY OF PRESENT ILLNESS
[de-identified] : 68-year-old male with history of diabetes, hypertension, hyperlipidemia and elevated PSA/bilateral hydrocele, s/p treatment for epididymitis, here for follow-up of the same. 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  Repeat ultrasound of the testicles showed  moderate hydrocele which reduced.  Has follow-up with urology\par History of elevated PSA with MRI of the prostate January 2023 with no suspicious lesions\par DM-A1c  down to 7.6, on metformin glipizide and Jardiance,   this am , sometimes drink OJ grape juice \par HTN on an ACE\par HLD on statin\par left shoulder pain since March 2023 with decreased range of motion. Ortho f/u consistent with impingement of left shoulder and confirmed on MRI in May 2023.

## 2023-07-05 NOTE — CONSULT LETTER
[Dear  ___] : Dear  [unfilled], [Consult Letter:] : I had the pleasure of evaluating your patient, [unfilled]. [Please see my note below.] : Please see my note below. [Consult Closing:] : Thank you very much for allowing me to participate in the care of this patient.  If you have any questions, please do not hesitate to contact me. [Sincerely,] : Sincerely, [FreeTextEntry3] : Jorge Troy M.D.

## 2023-07-05 NOTE — HISTORY OF PRESENT ILLNESS
[de-identified] : 68 year old RHD male presents for left shoulder MRI review. Denies neck pain, radiating pain past the elbow. \par PMHx of DM (on Metformin, Jardiance, Glipizide), HLD, HTN. A1c = 7.6% \par \par MRI [facility] revealed: \par IMPRESSION: 05/26/2023\par Moderate tendinosis of the supraspinatus and infraspinatus tendons with mild to moderate bursal surface fraying along the junctional insertional fibers. No full-thickness or retracted tear.\par Mild tendinosis of the subscapularis tendon with intrasubstance tearing along the cranial insertional fibers.\par Mild to moderate tendinosis of the intra-articular portion of the long head biceps tendon. 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 \par

## 2023-07-05 NOTE — ADDENDUM
[FreeTextEntry1] : I, LENYJIHAN MUELLER, acted solely as a scribe for Dr. Jorge Troy on this date 07/05/2023.\par \par All medical record entries made by the Scribe were at my, Dr. Jorge Troy, direction and personally dictated by me on 07/05/2023. I have reviewed the chart and agree that the record accurately reflects my personal performance of the history, physical exam, assessment and plan. I have also personally directed, reviewed, and agreed with the chart.

## 2023-07-06 ENCOUNTER — APPOINTMENT (OUTPATIENT)
Dept: UROLOGY | Facility: CLINIC | Age: 68
End: 2023-07-06
Payer: COMMERCIAL

## 2023-07-06 ENCOUNTER — OUTPATIENT (OUTPATIENT)
Dept: OUTPATIENT SERVICES | Facility: HOSPITAL | Age: 68
LOS: 1 days | End: 2023-07-06
Payer: COMMERCIAL

## 2023-07-06 DIAGNOSIS — R35.0 FREQUENCY OF MICTURITION: ICD-10-CM

## 2023-07-06 PROCEDURE — 88112 CYTOPATH CELL ENHANCE TECH: CPT | Mod: 26

## 2023-07-06 PROCEDURE — 55000 DRAINAGE OF HYDROCELE: CPT | Mod: RT

## 2023-07-06 PROCEDURE — 55000 DRAINAGE OF HYDROCELE: CPT

## 2023-07-10 DIAGNOSIS — N43.3 HYDROCELE, UNSPECIFIED: ICD-10-CM

## 2023-07-12 LAB — OTHER FLUID CYTOLOGY: NORMAL

## 2023-08-09 ENCOUNTER — APPOINTMENT (OUTPATIENT)
Dept: ORTHOPEDIC SURGERY | Facility: CLINIC | Age: 68
End: 2023-08-09
Payer: COMMERCIAL

## 2023-08-09 DIAGNOSIS — M75.102 UNSPECIFIED ROTATOR CUFF TEAR OR RUPTURE OF LEFT SHOULDER, NOT SPECIFIED AS TRAUMATIC: ICD-10-CM

## 2023-08-09 PROCEDURE — 99214 OFFICE O/P EST MOD 30 MIN: CPT

## 2023-08-09 NOTE — DISCUSSION/SUMMARY
[de-identified] : I went over the pathophysiology of the patient's symptoms in great detail with the patient. No surgical intervention is recommended at this time. He should avoid lifting anything overhead or past his shoulder level. We discussed the use of ice, Tylenol and anti-inflammatories to relieve pain. At this time, he will start a course of physical therapy for strengthening and flexibility.  We have discussed surgery but he is not interested at this point.  He would like to try the therapy however.  A prescription was provided.   All of their questions were answered. They understand and consent to the plan.   FU in 6 weeks.

## 2023-08-09 NOTE — HISTORY OF PRESENT ILLNESS
[de-identified] : 68-year-old RHD male presents for a left shoulder follow-up. He had a left shoulder cortisone injection on 07/05/2023. He reports some relief from the injection. He still continues to have intermittent pain. He notes at this time the pain is not significant. He denies neck pain.  PMHx of DM (on Metformin, Jardiance, Glipizide), HLD, HTN. A1c = 6.7%   MRI [facility] revealed:  IMPRESSION: 05/26/2023 Moderate tendinosis of the supraspinatus and infraspinatus tendons with mild to moderate bursal surface fraying along the junctional insertional fibers. No full-thickness or retracted tear. Mild tendinosis of the subscapularis tendon with intrasubstance tearing along the cranial insertional fibers. Mild to moderate tendinosis of the intra-articular portion of the long head biceps tendon. Mild intrasubstance tearing within the intra-articular portion. Subacromial/subdeltoid bursitis. 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  Radiology Results 04/27/2023 o Left Shoulder : AP internal/external rotation and outlet views were obtained and revealed minimal degenerative arthritis of the shoulder with no lytic lesions.

## 2023-08-09 NOTE — PHYSICAL EXAM
[de-identified] : Constitutional o Appearance : well-nourished, well developed, alert, in no acute distress  Head and Face o Head :  Inspection : atraumatic, normocephalic o Face :  Inspection : no visible rash or discoloration Respiratory o Respiratory Effort: breathing unlabored  Neurologic o Sensation : Normal sensation  Psychiatric o Mood and Affect: mood normal, affect appropriate  Lymphatic o Additional Nodes : No palpable lymph nodes present   Cervical Spine o Inspection/Palpation :  Inspection : alignment midline, normal degree of lordosis present  Skin : normal appearance, no masses or tenderness, trachea midline  Palpation : musculature is nontender to palpation o Range of Motion : limited rotation to the left  o Tests: Negative Spurling's test   Right Upper Extremity o Right Shoulder :  Inspection/Palpation : no tenderness, no swelling or deformities  Range of Motion : full and painless in all planes, no crepitance  Strength : forward elevation 5/5, IR 5/5, ER 5/5, ER at 90 of abduction 5/5, supraspinatus 5/5, adduction 5/5, abduction 5/5, biceps/triceps 5/5,  5/5   Stability : no joint instability on provocative testing   Tests: Murphy negative, Neer test negative, Josh test negative, drop arm test negative, Torrance's test negative   Left Upper Extremity o Left Shoulder :  Inspection/Palpation : no anterior capsular tenderness, tenderness over AC joint, no swelling or deformities, tendernss over the lateral aspect  Range of Motion : full forward flexion without discomfort, pain with eccentric flexion, slightly limited IR about3 vertebral levels, pain with full ER  Strength : forward elevation 4-/5, IR 5/5, ER 5/5, ER at 90 of abduction 5/5, supraspinatus 4-/5, adduction 5/5, abduction 5/5, biceps/triceps 5/5,  5/5   Stability : no joint instability on provocative testing,   Tests: Murphy positive, Neer test negative, Josh test positive, drop arm test negative, Torrance's test negative   Gait and Station: Gait: gait normal, no significant extremity swelling or lymphedema, good proprioception and balance

## 2023-09-20 ENCOUNTER — APPOINTMENT (OUTPATIENT)
Dept: ORTHOPEDIC SURGERY | Facility: CLINIC | Age: 68
End: 2023-09-20

## 2023-10-16 ENCOUNTER — NON-APPOINTMENT (OUTPATIENT)
Age: 68
End: 2023-10-16

## 2023-10-18 DIAGNOSIS — D22.9 MELANOCYTIC NEVI, UNSPECIFIED: ICD-10-CM

## 2023-10-18 DIAGNOSIS — M17.10 UNILATERAL PRIMARY OSTEOARTHRITIS, UNSPECIFIED KNEE: ICD-10-CM

## 2023-10-18 DIAGNOSIS — M19.019 PRIMARY OSTEOARTHRITIS, UNSPECIFIED SHOULDER: ICD-10-CM

## 2023-10-18 LAB
APPEARANCE: ABNORMAL
BACTERIA UR CULT: NORMAL
BACTERIA: NEGATIVE /HPF
BILIRUBIN URINE: ABNORMAL
BLOOD URINE: ABNORMAL
CAST: 2 /LPF
COLOR: ABNORMAL
EPITHELIAL CELLS: 1 /HPF
GLUCOSE QUALITATIVE U: >=1000 MG/DL
KETONES URINE: NEGATIVE MG/DL
LEUKOCYTE ESTERASE URINE: ABNORMAL
MICROSCOPIC-UA: NORMAL
NITRITE URINE: POSITIVE
PH URINE: 6
PROTEIN URINE: 300 MG/DL
RED BLOOD CELLS URINE: >1900 /HPF
REVIEW: NORMAL
SPECIFIC GRAVITY URINE: >1.03
UROBILINOGEN URINE: 0.2 MG/DL
WHITE BLOOD CELLS URINE: 100 /HPF

## 2023-10-23 ENCOUNTER — APPOINTMENT (OUTPATIENT)
Dept: INTERNAL MEDICINE | Facility: CLINIC | Age: 68
End: 2023-10-23
Payer: COMMERCIAL

## 2023-10-23 ENCOUNTER — OUTPATIENT (OUTPATIENT)
Dept: OUTPATIENT SERVICES | Facility: HOSPITAL | Age: 68
LOS: 1 days | End: 2023-10-23
Payer: COMMERCIAL

## 2023-10-23 ENCOUNTER — LABORATORY RESULT (OUTPATIENT)
Age: 68
End: 2023-10-23

## 2023-10-23 VITALS
BODY MASS INDEX: 30.29 KG/M2 | TEMPERATURE: 99.8 F | HEART RATE: 94 BPM | HEIGHT: 67 IN | OXYGEN SATURATION: 97 % | WEIGHT: 193 LBS | SYSTOLIC BLOOD PRESSURE: 120 MMHG | DIASTOLIC BLOOD PRESSURE: 80 MMHG

## 2023-10-23 DIAGNOSIS — R59.0 LOCALIZED ENLARGED LYMPH NODES: ICD-10-CM

## 2023-10-23 DIAGNOSIS — R31.0 GROSS HEMATURIA: ICD-10-CM

## 2023-10-23 DIAGNOSIS — I10 ESSENTIAL (PRIMARY) HYPERTENSION: ICD-10-CM

## 2023-10-23 DIAGNOSIS — H91.90 UNSPECIFIED HEARING LOSS, UNSPECIFIED EAR: ICD-10-CM

## 2023-10-23 DIAGNOSIS — J02.9 ACUTE PHARYNGITIS, UNSPECIFIED: ICD-10-CM

## 2023-10-23 PROCEDURE — 99215 OFFICE O/P EST HI 40 MIN: CPT | Mod: 25

## 2023-10-23 PROCEDURE — G0463: CPT | Mod: 25

## 2023-10-24 PROBLEM — J02.9 ACUTE PHARYNGITIS, UNSPECIFIED: Status: ACTIVE | Noted: 2023-10-23 | Resolved: 2023-11-22

## 2023-10-25 ENCOUNTER — APPOINTMENT (OUTPATIENT)
Dept: CT IMAGING | Facility: IMAGING CENTER | Age: 68
End: 2023-10-25
Payer: COMMERCIAL

## 2023-10-25 ENCOUNTER — RESULT REVIEW (OUTPATIENT)
Age: 68
End: 2023-10-25

## 2023-10-25 ENCOUNTER — INPATIENT (INPATIENT)
Facility: HOSPITAL | Age: 68
LOS: 5 days | Discharge: HOME HEALTH | End: 2023-10-31
Attending: HOSPITALIST | Admitting: HOSPITALIST
Payer: COMMERCIAL

## 2023-10-25 ENCOUNTER — OUTPATIENT (OUTPATIENT)
Dept: OUTPATIENT SERVICES | Facility: HOSPITAL | Age: 68
LOS: 1 days | End: 2023-10-25
Payer: COMMERCIAL

## 2023-10-25 ENCOUNTER — APPOINTMENT (OUTPATIENT)
Dept: CT IMAGING | Facility: IMAGING CENTER | Age: 68
End: 2023-10-25

## 2023-10-25 VITALS
TEMPERATURE: 98 F | HEART RATE: 101 BPM | DIASTOLIC BLOOD PRESSURE: 88 MMHG | SYSTOLIC BLOOD PRESSURE: 141 MMHG | OXYGEN SATURATION: 100 % | RESPIRATION RATE: 18 BRPM

## 2023-10-25 DIAGNOSIS — M17.10 UNILATERAL PRIMARY OSTEOARTHRITIS, UNSPECIFIED KNEE: ICD-10-CM

## 2023-10-25 LAB
ALBUMIN SERPL ELPH-MCNC: 3.8 G/DL — SIGNIFICANT CHANGE UP (ref 3.3–5)
ALBUMIN SERPL ELPH-MCNC: 3.8 G/DL — SIGNIFICANT CHANGE UP (ref 3.3–5)
ALP SERPL-CCNC: 51 U/L — SIGNIFICANT CHANGE UP (ref 40–120)
ALP SERPL-CCNC: 51 U/L — SIGNIFICANT CHANGE UP (ref 40–120)
ALT FLD-CCNC: 27 U/L — SIGNIFICANT CHANGE UP (ref 4–41)
ALT FLD-CCNC: 27 U/L — SIGNIFICANT CHANGE UP (ref 4–41)
ANION GAP SERPL CALC-SCNC: 16 MMOL/L — HIGH (ref 7–14)
ANION GAP SERPL CALC-SCNC: 16 MMOL/L — HIGH (ref 7–14)
AST SERPL-CCNC: 17 U/L — SIGNIFICANT CHANGE UP (ref 4–40)
AST SERPL-CCNC: 17 U/L — SIGNIFICANT CHANGE UP (ref 4–40)
BASE EXCESS BLDV CALC-SCNC: 1.9 MMOL/L — SIGNIFICANT CHANGE UP (ref -2–3)
BASE EXCESS BLDV CALC-SCNC: 1.9 MMOL/L — SIGNIFICANT CHANGE UP (ref -2–3)
BASOPHILS # BLD AUTO: 0.05 K/UL — SIGNIFICANT CHANGE UP (ref 0–0.2)
BASOPHILS # BLD AUTO: 0.05 K/UL — SIGNIFICANT CHANGE UP (ref 0–0.2)
BASOPHILS NFR BLD AUTO: 0.3 % — SIGNIFICANT CHANGE UP (ref 0–2)
BASOPHILS NFR BLD AUTO: 0.3 % — SIGNIFICANT CHANGE UP (ref 0–2)
BILIRUB SERPL-MCNC: 0.5 MG/DL — SIGNIFICANT CHANGE UP (ref 0.2–1.2)
BILIRUB SERPL-MCNC: 0.5 MG/DL — SIGNIFICANT CHANGE UP (ref 0.2–1.2)
BLOOD GAS VENOUS COMPREHENSIVE RESULT: SIGNIFICANT CHANGE UP
BLOOD GAS VENOUS COMPREHENSIVE RESULT: SIGNIFICANT CHANGE UP
BUN SERPL-MCNC: 13 MG/DL — SIGNIFICANT CHANGE UP (ref 7–23)
BUN SERPL-MCNC: 13 MG/DL — SIGNIFICANT CHANGE UP (ref 7–23)
CALCIUM SERPL-MCNC: 9.6 MG/DL — SIGNIFICANT CHANGE UP (ref 8.4–10.5)
CALCIUM SERPL-MCNC: 9.6 MG/DL — SIGNIFICANT CHANGE UP (ref 8.4–10.5)
CHLORIDE BLDV-SCNC: 98 MMOL/L — SIGNIFICANT CHANGE UP (ref 96–108)
CHLORIDE BLDV-SCNC: 98 MMOL/L — SIGNIFICANT CHANGE UP (ref 96–108)
CHLORIDE SERPL-SCNC: 98 MMOL/L — SIGNIFICANT CHANGE UP (ref 98–107)
CHLORIDE SERPL-SCNC: 98 MMOL/L — SIGNIFICANT CHANGE UP (ref 98–107)
CO2 BLDV-SCNC: 27.9 MMOL/L — HIGH (ref 22–26)
CO2 BLDV-SCNC: 27.9 MMOL/L — HIGH (ref 22–26)
CO2 SERPL-SCNC: 23 MMOL/L — SIGNIFICANT CHANGE UP (ref 22–31)
CO2 SERPL-SCNC: 23 MMOL/L — SIGNIFICANT CHANGE UP (ref 22–31)
CREAT SERPL-MCNC: 0.93 MG/DL — SIGNIFICANT CHANGE UP (ref 0.5–1.3)
CREAT SERPL-MCNC: 0.93 MG/DL — SIGNIFICANT CHANGE UP (ref 0.5–1.3)
EGFR: 90 ML/MIN/1.73M2 — SIGNIFICANT CHANGE UP
EGFR: 90 ML/MIN/1.73M2 — SIGNIFICANT CHANGE UP
EOSINOPHIL # BLD AUTO: 0.01 K/UL — SIGNIFICANT CHANGE UP (ref 0–0.5)
EOSINOPHIL # BLD AUTO: 0.01 K/UL — SIGNIFICANT CHANGE UP (ref 0–0.5)
EOSINOPHIL NFR BLD AUTO: 0.1 % — SIGNIFICANT CHANGE UP (ref 0–6)
EOSINOPHIL NFR BLD AUTO: 0.1 % — SIGNIFICANT CHANGE UP (ref 0–6)
GAS PNL BLDV: 132 MMOL/L — LOW (ref 136–145)
GAS PNL BLDV: 132 MMOL/L — LOW (ref 136–145)
GLUCOSE BLDV-MCNC: 118 MG/DL — HIGH (ref 70–99)
GLUCOSE BLDV-MCNC: 118 MG/DL — HIGH (ref 70–99)
GLUCOSE SERPL-MCNC: 114 MG/DL — HIGH (ref 70–99)
GLUCOSE SERPL-MCNC: 114 MG/DL — HIGH (ref 70–99)
HCO3 BLDV-SCNC: 27 MMOL/L — SIGNIFICANT CHANGE UP (ref 22–29)
HCO3 BLDV-SCNC: 27 MMOL/L — SIGNIFICANT CHANGE UP (ref 22–29)
HCT VFR BLD CALC: 40.1 % — SIGNIFICANT CHANGE UP (ref 39–50)
HCT VFR BLD CALC: 40.1 % — SIGNIFICANT CHANGE UP (ref 39–50)
HCT VFR BLDA CALC: 41 % — SIGNIFICANT CHANGE UP (ref 39–51)
HCT VFR BLDA CALC: 41 % — SIGNIFICANT CHANGE UP (ref 39–51)
HGB BLD CALC-MCNC: 13.7 G/DL — SIGNIFICANT CHANGE UP (ref 12.6–17.4)
HGB BLD CALC-MCNC: 13.7 G/DL — SIGNIFICANT CHANGE UP (ref 12.6–17.4)
HGB BLD-MCNC: 13.4 G/DL — SIGNIFICANT CHANGE UP (ref 13–17)
HGB BLD-MCNC: 13.4 G/DL — SIGNIFICANT CHANGE UP (ref 13–17)
IANC: 15.63 K/UL — HIGH (ref 1.8–7.4)
IANC: 15.63 K/UL — HIGH (ref 1.8–7.4)
IMM GRANULOCYTES NFR BLD AUTO: 0.9 % — SIGNIFICANT CHANGE UP (ref 0–0.9)
IMM GRANULOCYTES NFR BLD AUTO: 0.9 % — SIGNIFICANT CHANGE UP (ref 0–0.9)
LACTATE BLDV-MCNC: 1.2 MMOL/L — SIGNIFICANT CHANGE UP (ref 0.5–2)
LACTATE BLDV-MCNC: 1.2 MMOL/L — SIGNIFICANT CHANGE UP (ref 0.5–2)
LYMPHOCYTES # BLD AUTO: 1.87 K/UL — SIGNIFICANT CHANGE UP (ref 1–3.3)
LYMPHOCYTES # BLD AUTO: 1.87 K/UL — SIGNIFICANT CHANGE UP (ref 1–3.3)
LYMPHOCYTES # BLD AUTO: 9.7 % — LOW (ref 13–44)
LYMPHOCYTES # BLD AUTO: 9.7 % — LOW (ref 13–44)
MAGNESIUM SERPL-MCNC: 1.8 MG/DL — SIGNIFICANT CHANGE UP (ref 1.6–2.6)
MAGNESIUM SERPL-MCNC: 1.8 MG/DL — SIGNIFICANT CHANGE UP (ref 1.6–2.6)
MCHC RBC-ENTMCNC: 29.3 PG — SIGNIFICANT CHANGE UP (ref 27–34)
MCHC RBC-ENTMCNC: 29.3 PG — SIGNIFICANT CHANGE UP (ref 27–34)
MCHC RBC-ENTMCNC: 33.4 GM/DL — SIGNIFICANT CHANGE UP (ref 32–36)
MCHC RBC-ENTMCNC: 33.4 GM/DL — SIGNIFICANT CHANGE UP (ref 32–36)
MCV RBC AUTO: 87.7 FL — SIGNIFICANT CHANGE UP (ref 80–100)
MCV RBC AUTO: 87.7 FL — SIGNIFICANT CHANGE UP (ref 80–100)
MONOCYTES # BLD AUTO: 1.56 K/UL — HIGH (ref 0–0.9)
MONOCYTES # BLD AUTO: 1.56 K/UL — HIGH (ref 0–0.9)
MONOCYTES NFR BLD AUTO: 8.1 % — SIGNIFICANT CHANGE UP (ref 2–14)
MONOCYTES NFR BLD AUTO: 8.1 % — SIGNIFICANT CHANGE UP (ref 2–14)
NEUTROPHILS # BLD AUTO: 15.63 K/UL — HIGH (ref 1.8–7.4)
NEUTROPHILS # BLD AUTO: 15.63 K/UL — HIGH (ref 1.8–7.4)
NEUTROPHILS NFR BLD AUTO: 80.9 % — HIGH (ref 43–77)
NEUTROPHILS NFR BLD AUTO: 80.9 % — HIGH (ref 43–77)
NRBC # BLD: 0 /100 WBCS — SIGNIFICANT CHANGE UP (ref 0–0)
NRBC # BLD: 0 /100 WBCS — SIGNIFICANT CHANGE UP (ref 0–0)
NRBC # FLD: 0 K/UL — SIGNIFICANT CHANGE UP (ref 0–0)
NRBC # FLD: 0 K/UL — SIGNIFICANT CHANGE UP (ref 0–0)
PCO2 BLDV: 41 MMHG — LOW (ref 42–55)
PCO2 BLDV: 41 MMHG — LOW (ref 42–55)
PH BLDV: 7.42 — SIGNIFICANT CHANGE UP (ref 7.32–7.43)
PH BLDV: 7.42 — SIGNIFICANT CHANGE UP (ref 7.32–7.43)
PHOSPHATE SERPL-MCNC: 3.5 MG/DL — SIGNIFICANT CHANGE UP (ref 2.5–4.5)
PHOSPHATE SERPL-MCNC: 3.5 MG/DL — SIGNIFICANT CHANGE UP (ref 2.5–4.5)
PLATELET # BLD AUTO: 378 K/UL — SIGNIFICANT CHANGE UP (ref 150–400)
PLATELET # BLD AUTO: 378 K/UL — SIGNIFICANT CHANGE UP (ref 150–400)
PO2 BLDV: 45 MMHG — SIGNIFICANT CHANGE UP (ref 25–45)
PO2 BLDV: 45 MMHG — SIGNIFICANT CHANGE UP (ref 25–45)
POTASSIUM BLDV-SCNC: 4 MMOL/L — SIGNIFICANT CHANGE UP (ref 3.5–5.1)
POTASSIUM BLDV-SCNC: 4 MMOL/L — SIGNIFICANT CHANGE UP (ref 3.5–5.1)
POTASSIUM SERPL-MCNC: 4.1 MMOL/L — SIGNIFICANT CHANGE UP (ref 3.5–5.3)
POTASSIUM SERPL-MCNC: 4.1 MMOL/L — SIGNIFICANT CHANGE UP (ref 3.5–5.3)
POTASSIUM SERPL-SCNC: 4.1 MMOL/L — SIGNIFICANT CHANGE UP (ref 3.5–5.3)
POTASSIUM SERPL-SCNC: 4.1 MMOL/L — SIGNIFICANT CHANGE UP (ref 3.5–5.3)
PROT SERPL-MCNC: 7.8 G/DL — SIGNIFICANT CHANGE UP (ref 6–8.3)
PROT SERPL-MCNC: 7.8 G/DL — SIGNIFICANT CHANGE UP (ref 6–8.3)
RBC # BLD: 4.57 M/UL — SIGNIFICANT CHANGE UP (ref 4.2–5.8)
RBC # BLD: 4.57 M/UL — SIGNIFICANT CHANGE UP (ref 4.2–5.8)
RBC # FLD: 13.9 % — SIGNIFICANT CHANGE UP (ref 10.3–14.5)
RBC # FLD: 13.9 % — SIGNIFICANT CHANGE UP (ref 10.3–14.5)
SAO2 % BLDV: 75.1 % — SIGNIFICANT CHANGE UP (ref 67–88)
SAO2 % BLDV: 75.1 % — SIGNIFICANT CHANGE UP (ref 67–88)
SODIUM SERPL-SCNC: 137 MMOL/L — SIGNIFICANT CHANGE UP (ref 135–145)
SODIUM SERPL-SCNC: 137 MMOL/L — SIGNIFICANT CHANGE UP (ref 135–145)
WBC # BLD: 19.29 K/UL — HIGH (ref 3.8–10.5)
WBC # BLD: 19.29 K/UL — HIGH (ref 3.8–10.5)
WBC # FLD AUTO: 19.29 K/UL — HIGH (ref 3.8–10.5)
WBC # FLD AUTO: 19.29 K/UL — HIGH (ref 3.8–10.5)

## 2023-10-25 PROCEDURE — 71260 CT THORAX DX C+: CPT | Mod: 26,MA

## 2023-10-25 PROCEDURE — 70491 CT SOFT TISSUE NECK W/DYE: CPT | Mod: 26,MA

## 2023-10-25 PROCEDURE — 99285 EMERGENCY DEPT VISIT HI MDM: CPT

## 2023-10-25 PROCEDURE — 74178 CT ABD&PLV WO CNTR FLWD CNTR: CPT | Mod: 26

## 2023-10-25 PROCEDURE — 71045 X-RAY EXAM CHEST 1 VIEW: CPT | Mod: 26

## 2023-10-25 PROCEDURE — 74178 CT ABD&PLV WO CNTR FLWD CNTR: CPT

## 2023-10-25 RX ORDER — LIDOCAINE 4 G/100G
1 CREAM TOPICAL ONCE
Refills: 0 | Status: COMPLETED | OUTPATIENT
Start: 2023-10-25 | End: 2023-10-25

## 2023-10-25 RX ORDER — MORPHINE SULFATE 50 MG/1
4 CAPSULE, EXTENDED RELEASE ORAL ONCE
Refills: 0 | Status: DISCONTINUED | OUTPATIENT
Start: 2023-10-25 | End: 2023-10-25

## 2023-10-25 RX ADMIN — MORPHINE SULFATE 4 MILLIGRAM(S): 50 CAPSULE, EXTENDED RELEASE ORAL at 19:23

## 2023-10-25 RX ADMIN — LIDOCAINE 1 PATCH: 4 CREAM TOPICAL at 20:05

## 2023-10-25 NOTE — ED PROVIDER NOTE - ATTENDING CONTRIBUTION TO CARE
DR. BLOCH, ATTENDING MD-  I performed a face to face bedside interview with patient regarding history of present illness, review of symptoms and past medical history. I completed an independent physical exam.  I have discussed patient's plan of care with the resident.  Patient examined, mod distress from pain, PERRL, pain on moving neck firmness and tender right side of neck,  and anterior chest with swelling and tenderness mid lateral clavicular area. pain on rom right arm. unable to turn neck or  flex. neurological exam, motor 5/5, sensation intact. no edema.

## 2023-10-25 NOTE — ED ADULT TRIAGE NOTE - CHIEF COMPLAINT QUOTE
Pt complaining of a chest pain, throat pain and pain that radiates down his R arm to his chest. Pt denies, sob, n/v/d, fever or chills. Pt complaining of a chest pain, throat pain and pain that radiates down his R arm to his chest. Pt denies, sob, n/v/d, fever or chills. pt has a hx of diabetes.

## 2023-10-25 NOTE — ED ADULT NURSE REASSESSMENT NOTE - NS ED NURSE REASSESS COMMENT FT1
patient received at from day RN, patient at baseline mental status, appears to be resting comfortably in bed, no complaints noted at this time. Breathing even and unlabored, pallor/diaphoresis not noted. IV site clean dry and intact.

## 2023-10-25 NOTE — ED PROVIDER NOTE - CLINICAL SUMMARY MEDICAL DECISION MAKING FREE TEXT BOX
Abisai COUGHLIN PGY-3: 68-year-old male history of hypertension, diabetes, presenting with chief complaint of right sided chest swelling and right-sided neck pain and swelling since Monday. Afebrile, maintaining airway, well appearing.   With tenderness to palpation to the right-sided chest below the clavicular,  minor swelling.  Patient also with swelling around the right-sided aspect of the neck with minor swelling.  Patient nonmeningeal, able to range neck, however some pain to the right aspect of the neck.  Non meningeal, low suspicion for meningitis.  No fluctuance appreciated, no erythema, no warmth to touch.  Clinical picture most consistent with likely musculoskeletal etiology, however will obtain CT imaging to evaluate for  possible infectious process/Lemierre's disease.

## 2023-10-25 NOTE — ED ADULT NURSE NOTE - OBJECTIVE STATEMENT
Pt arrives to room 11 A/O x 3 and ambulatory at baseline. PMhx of Type II DM, hypertension, and HLD. c/o pain from left shoulder across to right shoulder, radiates up the right side of neck and down right arm. Right clavicle noted to be swollen and tender on palpation. Endorsing sore throat states "my tonsils are swollen". Pt arrives to room 11 A/O x 3 and ambulatory at baseline. PMhx of Type II DM, hypertension, and HLD. c/o pain to right shoulder and right side of neck. Right clavicle noted to be swollen and tender on palpation. Endorsing sore throat states "my tonsils are swollen". Reports taking Meloxicam without relief. Denies headache, dizziness, SOB, chills, nausea, vomiting, diarrhea and constipation. Breathing even and unlabored. NSR on cardiac monitor. 20 gauge IV placed to right AC, labs drawn and sent. Medicated per EMAR. Care on going, comfort measures provided, and safety measures maintained. Pending lab results and CT scan.

## 2023-10-25 NOTE — ED PROVIDER NOTE - PHYSICAL EXAMINATION
Gen: NAD; well appearing  Head: NCAT  Eyes: EOMI, PERRLA, no conjunctival pallor, no scleral icterus  ENT: mucous membranes moist, no discharge  Neck: neck supple  Resp: CTAB, no W/R/R  CV: RRR, +S1/S2, no M/R/G  GI: Abdomen soft non-distended, NTTP, no masses  MSK: +R chest swelling and ttp, R neck ttp  Neuro: A&Ox4, sensation nl, motor 5/5 RUE/LUE/RLE/LLE, follows commands  Ext: no edema, no deformity, warm and well-perfused  Skin: no rash or bruising

## 2023-10-25 NOTE — ED PROVIDER NOTE - OBJECTIVE STATEMENT
68-year-old male history of hypertension, diabetes, presenting with chief complaint of right sided chest swelling and right-sided neck pain and swelling since Monday.  Patient denying any stridor, drooling, shortness of breath.   Denying any difficulty swallowing, however endorsing some sore throat.   No nausea, vomiting, fevers or chills. Took a meloxicam.

## 2023-10-25 NOTE — ED PROVIDER NOTE - PROGRESS NOTE DETAILS
Abisai COUGHLIN PGY-3: Ortho and ENT paged awiating call back. Abisai COUGHLIN PGY-3: spoke with radiology. Ill defined soft tissue around clavicle, right sternal clavicular joint going up into neck, probably all infectious. some possible erosive changes at sternoclavicular joint, possible osteomyelitis at right medial clavicle and r sternal clavicular joint. Recommending MRI.  asymmetric thickening of the R ariepiglottic fold, recs ENT consult for direct visualization to r/o mass (not urgent). Abisai COUGHLIN PGY-3: asymmetric widening of the right sternoclavicular joint with fluid attenuation in   the joint space c/f possible septic joint. orthopedics paged again. Abisai COUGHLIN PGY-3: spoke with ortho. Recs for IR to drain joint. Will admit to medicine.

## 2023-10-25 NOTE — ED ADULT NURSE NOTE - NSFALLUNIVINTERV_ED_ALL_ED
Bed/Stretcher in lowest position, wheels locked, appropriate side rails in place/Call bell, personal items and telephone in reach/Instruct patient to call for assistance before getting out of bed/chair/stretcher/Non-slip footwear applied when patient is off stretcher/[place] to call system/Physically safe environment - no spills, clutter or unnecessary equipment/Purposeful proactive rounding/Room/bathroom lighting operational, light cord in reach

## 2023-10-25 NOTE — ED PROVIDER NOTE - CARE PLAN
Principal Discharge DX:	Osteomyelitis   1 Principal Discharge DX:	Osteomyelitis  Secondary Diagnosis:	Effusion of sternoclavicular joint

## 2023-10-25 NOTE — ED ADULT NURSE NOTE - CHIEF COMPLAINT QUOTE
Pt complaining of a chest pain, throat pain and pain that radiates down his R arm to his chest. Pt denies, sob, n/v/d, fever or chills. pt has a hx of diabetes.

## 2023-10-26 ENCOUNTER — APPOINTMENT (OUTPATIENT)
Dept: UROLOGY | Facility: CLINIC | Age: 68
End: 2023-10-26

## 2023-10-26 ENCOUNTER — NON-APPOINTMENT (OUTPATIENT)
Age: 68
End: 2023-10-26

## 2023-10-26 ENCOUNTER — TRANSCRIPTION ENCOUNTER (OUTPATIENT)
Age: 68
End: 2023-10-26

## 2023-10-26 DIAGNOSIS — J04.0 ACUTE LARYNGITIS: ICD-10-CM

## 2023-10-26 DIAGNOSIS — I10 ESSENTIAL (PRIMARY) HYPERTENSION: ICD-10-CM

## 2023-10-26 DIAGNOSIS — N39.0 URINARY TRACT INFECTION, SITE NOT SPECIFIED: ICD-10-CM

## 2023-10-26 DIAGNOSIS — M00.9 PYOGENIC ARTHRITIS, UNSPECIFIED: ICD-10-CM

## 2023-10-26 DIAGNOSIS — E04.1 NONTOXIC SINGLE THYROID NODULE: ICD-10-CM

## 2023-10-26 DIAGNOSIS — J38.7 OTHER DISEASES OF LARYNX: ICD-10-CM

## 2023-10-26 DIAGNOSIS — E11.9 TYPE 2 DIABETES MELLITUS WITHOUT COMPLICATIONS: ICD-10-CM

## 2023-10-26 DIAGNOSIS — R22.2 LOCALIZED SWELLING, MASS AND LUMP, TRUNK: ICD-10-CM

## 2023-10-26 DIAGNOSIS — M86.9 OSTEOMYELITIS, UNSPECIFIED: ICD-10-CM

## 2023-10-26 DIAGNOSIS — Z29.9 ENCOUNTER FOR PROPHYLACTIC MEASURES, UNSPECIFIED: ICD-10-CM

## 2023-10-26 PROBLEM — R59.0 CERVICAL ADENOPATHY: Status: ACTIVE | Noted: 2023-10-23

## 2023-10-26 LAB
APPEARANCE UR: CLEAR — SIGNIFICANT CHANGE UP
APPEARANCE UR: CLEAR — SIGNIFICANT CHANGE UP
APTT BLD: 32.5 SEC — SIGNIFICANT CHANGE UP (ref 24.5–35.6)
APTT BLD: 32.5 SEC — SIGNIFICANT CHANGE UP (ref 24.5–35.6)
BACTERIA # UR AUTO: NEGATIVE /HPF — SIGNIFICANT CHANGE UP
BACTERIA # UR AUTO: NEGATIVE /HPF — SIGNIFICANT CHANGE UP
BILIRUB UR-MCNC: NEGATIVE — SIGNIFICANT CHANGE UP
BILIRUB UR-MCNC: NEGATIVE — SIGNIFICANT CHANGE UP
BLD GP AB SCN SERPL QL: NEGATIVE — SIGNIFICANT CHANGE UP
BLD GP AB SCN SERPL QL: NEGATIVE — SIGNIFICANT CHANGE UP
CAST: 2 /LPF — SIGNIFICANT CHANGE UP (ref 0–4)
CAST: 2 /LPF — SIGNIFICANT CHANGE UP (ref 0–4)
COLOR SPEC: YELLOW — SIGNIFICANT CHANGE UP
COLOR SPEC: YELLOW — SIGNIFICANT CHANGE UP
CRP SERPL-MCNC: 198.9 MG/L — HIGH
CRP SERPL-MCNC: 198.9 MG/L — HIGH
DIFF PNL FLD: ABNORMAL
DIFF PNL FLD: ABNORMAL
ERYTHROCYTE [SEDIMENTATION RATE] IN BLOOD: 78 MM/HR — HIGH (ref 1–15)
ERYTHROCYTE [SEDIMENTATION RATE] IN BLOOD: 78 MM/HR — HIGH (ref 1–15)
GLUCOSE BLDC GLUCOMTR-MCNC: 146 MG/DL — HIGH (ref 70–99)
GLUCOSE BLDC GLUCOMTR-MCNC: 146 MG/DL — HIGH (ref 70–99)
GLUCOSE BLDC GLUCOMTR-MCNC: 156 MG/DL — HIGH (ref 70–99)
GLUCOSE BLDC GLUCOMTR-MCNC: 156 MG/DL — HIGH (ref 70–99)
GLUCOSE BLDC GLUCOMTR-MCNC: 159 MG/DL — HIGH (ref 70–99)
GLUCOSE BLDC GLUCOMTR-MCNC: 159 MG/DL — HIGH (ref 70–99)
GLUCOSE BLDC GLUCOMTR-MCNC: 258 MG/DL — HIGH (ref 70–99)
GLUCOSE BLDC GLUCOMTR-MCNC: 258 MG/DL — HIGH (ref 70–99)
GLUCOSE BLDC GLUCOMTR-MCNC: 267 MG/DL — HIGH (ref 70–99)
GLUCOSE BLDC GLUCOMTR-MCNC: 267 MG/DL — HIGH (ref 70–99)
GLUCOSE UR QL: >=1000 MG/DL
GLUCOSE UR QL: >=1000 MG/DL
INR BLD: 1.4 RATIO — HIGH (ref 0.85–1.18)
INR BLD: 1.4 RATIO — HIGH (ref 0.85–1.18)
KETONES UR-MCNC: 15 MG/DL
KETONES UR-MCNC: 15 MG/DL
LEUKOCYTE ESTERASE UR-ACNC: NEGATIVE — SIGNIFICANT CHANGE UP
LEUKOCYTE ESTERASE UR-ACNC: NEGATIVE — SIGNIFICANT CHANGE UP
NITRITE UR-MCNC: NEGATIVE — SIGNIFICANT CHANGE UP
NITRITE UR-MCNC: NEGATIVE — SIGNIFICANT CHANGE UP
PH UR: 6 — SIGNIFICANT CHANGE UP (ref 5–8)
PH UR: 6 — SIGNIFICANT CHANGE UP (ref 5–8)
PROT UR-MCNC: SIGNIFICANT CHANGE UP MG/DL
PROT UR-MCNC: SIGNIFICANT CHANGE UP MG/DL
PROTHROM AB SERPL-ACNC: 15.5 SEC — HIGH (ref 9.5–13)
PROTHROM AB SERPL-ACNC: 15.5 SEC — HIGH (ref 9.5–13)
RBC CASTS # UR COMP ASSIST: 2 /HPF — SIGNIFICANT CHANGE UP (ref 0–4)
RBC CASTS # UR COMP ASSIST: 2 /HPF — SIGNIFICANT CHANGE UP (ref 0–4)
RH IG SCN BLD-IMP: POSITIVE — SIGNIFICANT CHANGE UP
RH IG SCN BLD-IMP: POSITIVE — SIGNIFICANT CHANGE UP
SP GR SPEC: 1.05 — HIGH (ref 1–1.03)
SP GR SPEC: 1.05 — HIGH (ref 1–1.03)
SQUAMOUS # UR AUTO: 2 /HPF — SIGNIFICANT CHANGE UP (ref 0–5)
SQUAMOUS # UR AUTO: 2 /HPF — SIGNIFICANT CHANGE UP (ref 0–5)
UROBILINOGEN FLD QL: 1 MG/DL — SIGNIFICANT CHANGE UP (ref 0.2–1)
UROBILINOGEN FLD QL: 1 MG/DL — SIGNIFICANT CHANGE UP (ref 0.2–1)
WBC UR QL: 3 /HPF — SIGNIFICANT CHANGE UP (ref 0–5)
WBC UR QL: 3 /HPF — SIGNIFICANT CHANGE UP (ref 0–5)

## 2023-10-26 PROCEDURE — 99222 1ST HOSP IP/OBS MODERATE 55: CPT | Mod: 57

## 2023-10-26 PROCEDURE — 99223 1ST HOSP IP/OBS HIGH 75: CPT

## 2023-10-26 RX ORDER — ENOXAPARIN SODIUM 100 MG/ML
40 INJECTION SUBCUTANEOUS EVERY 24 HOURS
Refills: 0 | Status: COMPLETED | OUTPATIENT
Start: 2023-10-26 | End: 2023-10-26

## 2023-10-26 RX ORDER — AMPICILLIN SODIUM AND SULBACTAM SODIUM 250; 125 MG/ML; MG/ML
3 INJECTION, POWDER, FOR SUSPENSION INTRAMUSCULAR; INTRAVENOUS EVERY 6 HOURS
Refills: 0 | Status: DISCONTINUED | OUTPATIENT
Start: 2023-10-26 | End: 2023-10-31

## 2023-10-26 RX ORDER — DEXTROSE 50 % IN WATER 50 %
12.5 SYRINGE (ML) INTRAVENOUS ONCE
Refills: 0 | Status: DISCONTINUED | OUTPATIENT
Start: 2023-10-26 | End: 2023-10-26

## 2023-10-26 RX ORDER — ACETAMINOPHEN 500 MG
975 TABLET ORAL EVERY 8 HOURS
Refills: 0 | Status: DISCONTINUED | OUTPATIENT
Start: 2023-10-26 | End: 2023-10-31

## 2023-10-26 RX ORDER — SODIUM CHLORIDE 9 MG/ML
1000 INJECTION, SOLUTION INTRAVENOUS
Refills: 0 | Status: DISCONTINUED | OUTPATIENT
Start: 2023-10-26 | End: 2023-10-26

## 2023-10-26 RX ORDER — EMPAGLIFLOZIN 10 MG/1
1 TABLET, FILM COATED ORAL
Refills: 0 | DISCHARGE

## 2023-10-26 RX ORDER — INSULIN LISPRO 100/ML
2 VIAL (ML) SUBCUTANEOUS
Refills: 0 | Status: DISCONTINUED | OUTPATIENT
Start: 2023-10-26 | End: 2023-10-28

## 2023-10-26 RX ORDER — INSULIN LISPRO 100/ML
VIAL (ML) SUBCUTANEOUS
Refills: 0 | Status: DISCONTINUED | OUTPATIENT
Start: 2023-10-26 | End: 2023-10-31

## 2023-10-26 RX ORDER — METFORMIN HYDROCHLORIDE 850 MG/1
1 TABLET ORAL
Refills: 0 | DISCHARGE

## 2023-10-26 RX ORDER — VANCOMYCIN HCL 1 G
1250 VIAL (EA) INTRAVENOUS EVERY 12 HOURS
Refills: 0 | Status: DISCONTINUED | OUTPATIENT
Start: 2023-10-26 | End: 2023-10-26

## 2023-10-26 RX ORDER — DEXTROSE 50 % IN WATER 50 %
15 SYRINGE (ML) INTRAVENOUS ONCE
Refills: 0 | Status: DISCONTINUED | OUTPATIENT
Start: 2023-10-26 | End: 2023-10-26

## 2023-10-26 RX ORDER — INFLUENZA VIRUS VACCINE 15; 15; 15; 15 UG/.5ML; UG/.5ML; UG/.5ML; UG/.5ML
0.7 SUSPENSION INTRAMUSCULAR ONCE
Refills: 0 | Status: DISCONTINUED | OUTPATIENT
Start: 2023-10-26 | End: 2023-10-31

## 2023-10-26 RX ORDER — INSULIN LISPRO 100/ML
VIAL (ML) SUBCUTANEOUS AT BEDTIME
Refills: 0 | Status: DISCONTINUED | OUTPATIENT
Start: 2023-10-26 | End: 2023-10-31

## 2023-10-26 RX ORDER — VANCOMYCIN HCL 1 G
1000 VIAL (EA) INTRAVENOUS ONCE
Refills: 0 | Status: COMPLETED | OUTPATIENT
Start: 2023-10-26 | End: 2023-10-26

## 2023-10-26 RX ORDER — INSULIN GLARGINE 100 [IU]/ML
6 INJECTION, SOLUTION SUBCUTANEOUS AT BEDTIME
Refills: 0 | Status: DISCONTINUED | OUTPATIENT
Start: 2023-10-26 | End: 2023-10-31

## 2023-10-26 RX ORDER — DEXTROSE 50 % IN WATER 50 %
25 SYRINGE (ML) INTRAVENOUS ONCE
Refills: 0 | Status: DISCONTINUED | OUTPATIENT
Start: 2023-10-26 | End: 2023-10-26

## 2023-10-26 RX ORDER — OMEPRAZOLE 10 MG/1
1 CAPSULE, DELAYED RELEASE ORAL
Refills: 0 | DISCHARGE

## 2023-10-26 RX ORDER — ATORVASTATIN CALCIUM 80 MG/1
1 TABLET, FILM COATED ORAL
Refills: 0 | DISCHARGE

## 2023-10-26 RX ORDER — PIPERACILLIN AND TAZOBACTAM 4; .5 G/20ML; G/20ML
3.38 INJECTION, POWDER, LYOPHILIZED, FOR SOLUTION INTRAVENOUS ONCE
Refills: 0 | Status: COMPLETED | OUTPATIENT
Start: 2023-10-26 | End: 2023-10-26

## 2023-10-26 RX ORDER — DEXTROSE 50 % IN WATER 50 %
25 SYRINGE (ML) INTRAVENOUS ONCE
Refills: 0 | Status: DISCONTINUED | OUTPATIENT
Start: 2023-10-26 | End: 2023-10-31

## 2023-10-26 RX ORDER — PIPERACILLIN AND TAZOBACTAM 4; .5 G/20ML; G/20ML
3.38 INJECTION, POWDER, LYOPHILIZED, FOR SOLUTION INTRAVENOUS EVERY 8 HOURS
Refills: 0 | Status: DISCONTINUED | OUTPATIENT
Start: 2023-10-26 | End: 2023-10-26

## 2023-10-26 RX ORDER — GLUCAGON INJECTION, SOLUTION 0.5 MG/.1ML
1 INJECTION, SOLUTION SUBCUTANEOUS ONCE
Refills: 0 | Status: DISCONTINUED | OUTPATIENT
Start: 2023-10-26 | End: 2023-10-26

## 2023-10-26 RX ADMIN — LIDOCAINE 1 PATCH: 4 CREAM TOPICAL at 08:00

## 2023-10-26 RX ADMIN — Medication 10 MILLIGRAM(S): at 12:50

## 2023-10-26 RX ADMIN — ENOXAPARIN SODIUM 40 MILLIGRAM(S): 100 INJECTION SUBCUTANEOUS at 12:48

## 2023-10-26 RX ADMIN — Medication 1: at 09:04

## 2023-10-26 RX ADMIN — AMPICILLIN SODIUM AND SULBACTAM SODIUM 200 GRAM(S): 250; 125 INJECTION, POWDER, FOR SUSPENSION INTRAMUSCULAR; INTRAVENOUS at 17:13

## 2023-10-26 RX ADMIN — PIPERACILLIN AND TAZOBACTAM 200 GRAM(S): 4; .5 INJECTION, POWDER, LYOPHILIZED, FOR SOLUTION INTRAVENOUS at 00:33

## 2023-10-26 RX ADMIN — Medication 1: at 17:44

## 2023-10-26 RX ADMIN — INSULIN GLARGINE 6 UNIT(S): 100 INJECTION, SOLUTION SUBCUTANEOUS at 22:31

## 2023-10-26 RX ADMIN — Medication 250 MILLIGRAM(S): at 03:39

## 2023-10-26 RX ADMIN — LIDOCAINE 1 PATCH: 4 CREAM TOPICAL at 06:26

## 2023-10-26 RX ADMIN — Medication 3: at 12:49

## 2023-10-26 RX ADMIN — Medication 2 UNIT(S): at 17:45

## 2023-10-26 RX ADMIN — PIPERACILLIN AND TAZOBACTAM 25 GRAM(S): 4; .5 INJECTION, POWDER, LYOPHILIZED, FOR SOLUTION INTRAVENOUS at 06:03

## 2023-10-26 NOTE — CONSULT NOTE ADULT - SUBJECTIVE AND OBJECTIVE BOX
Patient is a 68y old  Male who presents with a chief complaint of Septic (26 Oct 2023 10:52)    HPI:  68M w/HTN T2DM, HLD, Elevated PSA/Bilateral Hydrocele s/p epididymitis treatment presents with R sided chest swelling/neck pain since monday,      Patient reports that the R sided pain and swelling seemed to start on Monday and he went to his PCP, Dr. Narvaez for R sided neck discomfort and shoulder pain, with blood cultures drawn on 10/23 showing NGTD, ESR elevated to 63, and Urine culture positive for GBS. He reports never having this kind of pain before except in his left shoulder which was being treated for bursitis, He says there was pain that shot from his L shoulder to his Right and ever since then he was in pain. He reported it was mild and manageable at first, but was refractory to meloxicam and tylenol. On Wednesday, he had a scheduled CT of his pelvis and reports soon before his CT scan, his pain intensified severely to 10/10 and right after the scan, he went to [facility]. He reports pain is constant, and 14/10; worsens with movement of his shoulder and his neck. Also endorses some burning across his shoulder. He endorses some burning with urination for the past 3 days as well. He has never had anything severe like this before. Denies any recent trauma, any recent URI symptoms, any history of shoulder surgeries.     ED Course: 97.9, 141/88, 101, 18/min 100% RA  Patient was started on Vanc/Zosyn, Morphine 4mg IV x1,   CT A/P Showed signs concerning for potential sternoclavicular joint septic arthritis  Hypodense thyroid nodules as well.    (26 Oct 2023 04:09)       prior hospital charts reviewed [  ]  primary team notes reviewed [  ]  other consultant notes reviewed [  ]    PAST MEDICAL & SURGICAL HISTORY:  DM (diabetes mellitus)      Hypertension      Hydrocele          Allergies  No Known Allergies    ANTIMICROBIALS (past 90 days)  MEDICATIONS  (STANDING):  piperacillin/tazobactam IVPB..   25 mL/Hr IV Intermittent (10-26-23 @ 06:03)    piperacillin/tazobactam IVPB...   200 mL/Hr IV Intermittent (10-26-23 @ 00:33)    vancomycin  IVPB.   250 mL/Hr IV Intermittent (10-26-23 @ 03:39)        piperacillin/tazobactam IVPB.. 3.375 every 8 hours  vancomycin  IVPB 1250 every 12 hours    MEDICATIONS  (STANDING):  dextrose 50% Injectable 25 once  dextrose 50% Injectable 12.5 once  dextrose 50% Injectable 25 once  dextrose Oral Gel 15 once PRN  enalapril 10 daily  enoxaparin Injectable 40 every 24 hours  glucagon  Injectable 1 once  influenza  Vaccine (HIGH DOSE) 0.7 once  insulin lispro (ADMELOG) corrective regimen sliding scale  three times a day before meals  insulin lispro (ADMELOG) corrective regimen sliding scale  at bedtime    SOCIAL HISTORY:       FAMILY HISTORY:    REVIEW OF SYSTEMS  [  ] ROS unobtainable because:    [  ] All other systems negative except as noted below:	    Constitutional:  [ ] fever [ ] chills  [ ] weight loss  [ ] weakness  Skin:  [ ] rash [ ] phlebitis	  Eyes: [ ] icterus [ ] pain  [ ] discharge	  ENMT: [ ] sore throat  [ ] thrush [ ] ulcers [ ] exudates  Respiratory: [ ] dyspnea [ ] hemoptysis [ ] cough [ ] sputum	  Cardiovascular:  [ ] chest pain [ ] palpitations [ ] edema	  Gastrointestinal:  [ ] nausea [ ] vomiting [ ] diarrhea [ ] constipation [ ] pain	  Genitourinary:  [ ] dysuria [ ] frequency [ ] hematuria [ ] discharge [ ] flank pain  [ ] incontinence  Musculoskeletal:  [ ] myalgias [ ] arthralgias [ ] arthritis  [ ] back pain  Neurological:  [ ] headache [ ] seizures  [ ] confusion/altered mental status  Psychiatric:  [ ] anxiety [ ] depression	  Hematology/Lymphatics:  [ ] lymphadenopathy  Endocrine:  [ ] adrenal [ ] thyroid  Allergic/Immunologic:	 [ ] transplant [ ] seasonal    Vital Signs Last 24 Hrs  T(F): 98.4 (10-26-23 @ 05:42), Max: 99.9 (10-25-23 @ 18:50)  Vital Signs Last 24 Hrs  HR: 97 (10-26-23 @ 05:42) (90 - 101)  BP: 122/88 (10-26-23 @ 05:42) (122/88 - 144/95)  RR: 18 (10-26-23 @ 05:42)  SpO2: 95% (10-26-23 @ 05:42) (95% - 100%)  Wt(kg): --    PHYSICAL EXAM:  Constitutional: non-toxic, no distress  HEAD/EYES: anicteric, no conjunctival injection  ENT:  supple, no thrush  Cardiovascular:   normal S1, S2, no murmur, no edema  Respiratory:  clear BS bilaterally, no wheezes, no rales  GI:  soft, non-tender, normal bowel sounds  :  no morin, no CVA tenderness  Musculoskeletal:  no synovitis, normal ROM  Neurologic: awake and alert, normal strength, no focal findings  Skin:  no rash, no erythema, no phlebitis  Heme/Onc: no lymphadenopathy   Psychiatric:  awake, alert, appropriate mood                            13.4   19.29 )-----------( 378      ( 25 Oct 2023 19:30 )             40.1   10-    137  |  98  |  13  ----------------------------<  114<H>  4.1   |  23  |  0.93    Ca    9.6      25 Oct 2023 19:30  Phos  3.5     10-25  Mg     1.80     10-    TPro  7.8  /  Alb  3.8  /  TBili  0.5  /  DBili  x   /  AST  17  /  ALT  27  /  AlkPhos  51  10-25    Urinalysis Basic - ( 26 Oct 2023 07:28 )    Color: Yellow / Appearance: Clear / S.048 / pH: x  Gluc: x / Ketone: 15 mg/dL  / Bili: Negative / Urobili: 1.0 mg/dL   Blood: x / Protein: Trace mg/dL / Nitrite: Negative   Leuk Esterase: Negative / RBC: 2 /HPF / WBC 3 /HPF   Sq Epi: x / Non Sq Epi: 2 /HPF / Bacteria: Negative /HPF    MICROBIOLOGY:              RADIOLOGY:  imaging below personally reviewed and agree with findings SUBJECTIVE  Mr. Valverde is a 68M with h/o T2DM (A1c 8.4% 10/23/2023), HLD, HTN, Elevated PSA who presents with 4 days of progressively worsening right anterior chest, throat, and neck pain with CT findings concerning for SC joint effusion vs infection expanding into his neck / shoulder. He started with a sore throat that was fairly mild on Monday, but then he reported gross hematuria for which he presented to his primary care provider's office (Dr. Teran).     prior hospital charts reviewed [  ]  primary team notes reviewed [  ]  other consultant notes reviewed [  ]    PAST MEDICAL & SURGICAL HISTORY:  DM (diabetes mellitus)      Hypertension      Hydrocele          Allergies  No Known Allergies    ANTIMICROBIALS (past 90 days)  MEDICATIONS  (STANDING):  piperacillin/tazobactam IVPB..   25 mL/Hr IV Intermittent (10-26-23 @ 06:03)    piperacillin/tazobactam IVPB...   200 mL/Hr IV Intermittent (10-26-23 @ 00:33)    vancomycin  IVPB.   250 mL/Hr IV Intermittent (10-26-23 @ 03:39)        piperacillin/tazobactam IVPB.. 3.375 every 8 hours  vancomycin  IVPB 1250 every 12 hours    MEDICATIONS  (STANDING):  dextrose 50% Injectable 25 once  dextrose 50% Injectable 12.5 once  dextrose 50% Injectable 25 once  dextrose Oral Gel 15 once PRN  enalapril 10 daily  enoxaparin Injectable 40 every 24 hours  glucagon  Injectable 1 once  influenza  Vaccine (HIGH DOSE) 0.7 once  insulin lispro (ADMELOG) corrective regimen sliding scale  three times a day before meals  insulin lispro (ADMELOG) corrective regimen sliding scale  at bedtime    SOCIAL HISTORY:       FAMILY HISTORY:    REVIEW OF SYSTEMS  [  ] ROS unobtainable because:    [  ] All other systems negative except as noted below:	    Constitutional:  [ ] fever [ ] chills  [ ] weight loss  [ ] weakness  Skin:  [ ] rash [ ] phlebitis	  Eyes: [ ] icterus [ ] pain  [ ] discharge	  ENMT: [ ] sore throat  [ ] thrush [ ] ulcers [ ] exudates  Respiratory: [ ] dyspnea [ ] hemoptysis [ ] cough [ ] sputum	  Cardiovascular:  [ ] chest pain [ ] palpitations [ ] edema	  Gastrointestinal:  [ ] nausea [ ] vomiting [ ] diarrhea [ ] constipation [ ] pain	  Genitourinary:  [ ] dysuria [ ] frequency [ ] hematuria [ ] discharge [ ] flank pain  [ ] incontinence  Musculoskeletal:  [ ] myalgias [ ] arthralgias [ ] arthritis  [ ] back pain  Neurological:  [ ] headache [ ] seizures  [ ] confusion/altered mental status  Psychiatric:  [ ] anxiety [ ] depression	  Hematology/Lymphatics:  [ ] lymphadenopathy  Endocrine:  [ ] adrenal [ ] thyroid  Allergic/Immunologic:	 [ ] transplant [ ] seasonal    Vital Signs Last 24 Hrs  T(F): 98.4 (10-26-23 @ 05:42), Max: 99.9 (10-25-23 @ 18:50)  Vital Signs Last 24 Hrs  HR: 97 (10-26-23 @ 05:42) (90 - 101)  BP: 122/88 (10-26-23 @ 05:42) (122/88 - 144/95)  RR: 18 (10-26-23 @ 05:42)  SpO2: 95% (10-26-23 @ 05:42) (95% - 100%)  Wt(kg): --    PHYSICAL EXAM:  Constitutional: non-toxic, no distress  HEAD/EYES: anicteric, no conjunctival injection  ENT:  supple, no thrush  Cardiovascular:   normal S1, S2, no murmur, no edema  Respiratory:  clear BS bilaterally, no wheezes, no rales  GI:  soft, non-tender, normal bowel sounds  :  no morin, no CVA tenderness  Musculoskeletal:  no synovitis, normal ROM  Neurologic: awake and alert, normal strength, no focal findings  Skin:  no rash, no erythema, no phlebitis  Heme/Onc: no lymphadenopathy   Psychiatric:  awake, alert, appropriate mood                            13.4   19.29 )-----------( 378      ( 25 Oct 2023 19:30 )             40.1   10-25    137  |  98  |  13  ----------------------------<  114<H>  4.1   |  23  |  0.93    Ca    9.6      25 Oct 2023 19:30  Phos  3.5     10-25  Mg     1.80     10-25    TPro  7.8  /  Alb  3.8  /  TBili  0.5  /  DBili  x   /  AST  17  /  ALT  27  /  AlkPhos  51  10-25    Urinalysis Basic - ( 26 Oct 2023 07:28 )    Color: Yellow / Appearance: Clear / S.048 / pH: x  Gluc: x / Ketone: 15 mg/dL  / Bili: Negative / Urobili: 1.0 mg/dL   Blood: x / Protein: Trace mg/dL / Nitrite: Negative   Leuk Esterase: Negative / RBC: 2 /HPF / WBC 3 /HPF   Sq Epi: x / Non Sq Epi: 2 /HPF / Bacteria: Negative /HPF    MICROBIOLOGY:              RADIOLOGY:  imaging below personally reviewed and agree with findings SUBJECTIVE  Mr. Valverde is a 68M with h/o T2DM (A1c 8.4% 10/23/2023), HLD, HTN, Elevated PSA who presents with 4 days of progressively worsening right anterior chest, throat, and neck pain with CT findings concerning for SC joint effusion vs infection expanding into his neck / shoulder. He started with a sore throat that was fairly mild on Monday, but then he reported gross hematuria for which he presented to his primary care provider's office (Dr. Teran). For the hematuria, he was referred for outpatient CT A/P, which he reports was performed; however, afterward, he began experiencing the progressively worsening right-sided pain for which he presented to the ER.    He denies any symptoms prior to Monday and denies sick contacts. He says that he last traveled to the [place] 2023, which is also the last time he was sexually active. He says that his PCP also sent STI testing at that time as well. He denies any recent trauma & IVDU. He did have a LEFT-sided shoulder injection in 2023.     prior hospital charts reviewed [ x ]  primary team notes reviewed [  x ]  other consultant notes reviewed  [ x  ]    PAST MEDICAL & SURGICAL HISTORY:  DM (diabetes mellitus)  Hypertension  Hydrocele    Allergies  No Known Allergies    ANTIMICROBIALS (past 90 days)  MEDICATIONS  (STANDING):  piperacillin/tazobactam IVPB..   25 mL/Hr IV Intermittent (10-26-23 @ 06:03)    piperacillin/tazobactam IVPB...   200 mL/Hr IV Intermittent (10-26-23 @ 00:33)    vancomycin  IVPB.   250 mL/Hr IV Intermittent (10-26-23 @ 03:39)        piperacillin/tazobactam IVPB.. 3.375 every 8 hours  vancomycin  IVPB 1250 every 12 hours    MEDICATIONS  (STANDING):  dextrose 50% Injectable 25 once  dextrose 50% Injectable 12.5 once  dextrose 50% Injectable 25 once  dextrose Oral Gel 15 once PRN  enalapril 10 daily  enoxaparin Injectable 40 every 24 hours  glucagon  Injectable 1 once  influenza  Vaccine (HIGH DOSE) 0.7 once  insulin lispro (ADMELOG) corrective regimen sliding scale  three times a day before meals  insulin lispro (ADMELOG) corrective regimen sliding scale  at bedtime    SOCIAL HISTORY:       FAMILY HISTORY:    REVIEW OF SYSTEMS  [  ] ROS unobtainable because:    [  ] All other systems negative except as noted below:	    Constitutional:  [ ] fever [ ] chills  [ ] weight loss  [ ] weakness  Skin:  [ ] rash [ ] phlebitis	  Eyes: [ ] icterus [ ] pain  [ ] discharge	  ENMT: [ ] sore throat  [ ] thrush [ ] ulcers [ ] exudates  Respiratory: [ ] dyspnea [ ] hemoptysis [ ] cough [ ] sputum	  Cardiovascular:  [ ] chest pain [ ] palpitations [ ] edema	  Gastrointestinal:  [ ] nausea [ ] vomiting [ ] diarrhea [ ] constipation [ ] pain	  Genitourinary:  [ ] dysuria [ ] frequency [ ] hematuria [ ] discharge [ ] flank pain  [ ] incontinence  Musculoskeletal:  [ ] myalgias [ ] arthralgias [ ] arthritis  [ ] back pain  Neurological:  [ ] headache [ ] seizures  [ ] confusion/altered mental status  Psychiatric:  [ ] anxiety [ ] depression	  Hematology/Lymphatics:  [ ] lymphadenopathy  Endocrine:  [ ] adrenal [ ] thyroid  Allergic/Immunologic:	 [ ] transplant [ ] seasonal    Vital Signs Last 24 Hrs  T(F): 98.4 (10-26-23 @ 05:42), Max: 99.9 (10-25-23 @ 18:50)  Vital Signs Last 24 Hrs  HR: 97 (10-26-23 @ 05:42) (90 - 101)  BP: 122/88 (10-26-23 @ 05:42) (122/88 - 144/95)  RR: 18 (10-26-23 @ 05:42)  SpO2: 95% (10-26-23 @ 05:42) (95% - 100%)  Wt(kg): --    PHYSICAL EXAM:  Constitutional: non-toxic, no distress  HEAD/EYES: anicteric, no conjunctival injection  ENT:  supple, no thrush  Cardiovascular:   normal S1, S2, no murmur, no edema  Respiratory:  clear BS bilaterally, no wheezes, no rales  GI:  soft, non-tender, normal bowel sounds  :  no morin, no CVA tenderness  Musculoskeletal:  no synovitis, normal ROM  Neurologic: awake and alert, normal strength, no focal findings  Skin:  no rash, no erythema, no phlebitis  Heme/Onc: no lymphadenopathy   Psychiatric:  awake, alert, appropriate mood                        13.4   19.29 )-----------( 378      ( 25 Oct 2023 19:30 )             40.1   10-25    137  |  98  |  13  ----------------------------<  114<H>  4.1   |  23  |  0.93    Ca    9.6      25 Oct 2023 19:30  Phos  3.5     10-25  Mg     1.80     10-25    TPro  7.8  /  Alb  3.8  /  TBili  0.5  /  DBili  x   /  AST  17  /  ALT  27  /  AlkPhos  51  10-25    Urinalysis Basic - ( 26 Oct 2023 07:28 )    Color: Yellow / Appearance: Clear / S.048 / pH: x  Gluc: x / Ketone: 15 mg/dL  / Bili: Negative / Urobili: 1.0 mg/dL   Blood: x / Protein: Trace mg/dL / Nitrite: Negative   Leuk Esterase: Negative / RBC: 2 /HPF / WBC 3 /HPF   Sq Epi: x / Non Sq Epi: 2 /HPF / Bacteria: Negative /HPF    MICROBIOLOGY:  - BCx x2 pending  - UCx x1 pending  - Throat Culture (10/23/2023): +Strep anginosus  - UCx (10/23/2023): 50k-99k CFU GBS      RADIOLOGY:  < from: CT Chest w/ IV Cont (10.25.23 @ 22:26) >  IMPRESSION:    1. Asymmetric widening of the right sternoclavicular joint with fluid in   the joint and cortical irregularities involving the sternoclavicular   joint and cartilage of the first rib. Softtissue density material   extending from the right lower neck to the posterior aspect of the right   clavicle. Findings are overall suspicious for septic sternoclavicular   joint and possible underlying osteomyelitis with associated spread of   infection into the lower neck and posterior to the clavicle. MRI is   recommended for further evaluation.    2. Hypodense thyroid nodules for which nonurgent follow-up ultrasound is   recommended.    3. Small right pleural effusion and mild right basilar atelectasis.    < end of copied text >   SUBJECTIVE  Mr. Valverde is a 68M with h/o T2DM (A1c 8.4% 10/23/2023), HLD, HTN, Elevated PSA who presents with 4 days of progressively worsening right anterior chest, throat, and neck pain with CT findings concerning for SC joint effusion vs infection expanding into his neck / shoulder. He started with a sore throat that was fairly mild on Monday, but then he reported gross hematuria for which he presented to his primary care provider's office (Dr. Teran). For the hematuria, he was referred for outpatient CT A/P, which he reports was performed; however, afterward, he began experiencing the progressively worsening right-sided pain for which he presented to the ER.    He denies any symptoms prior to Monday and denies sick contacts. He says that he last traveled to the [place] 2023, which is also the last time he was sexually active. He says that his PCP also sent STI testing at that time as well. He denies any recent trauma & IVDU. He did have a LEFT-sided shoulder injection in 2023.     prior hospital charts reviewed [ x ]  primary team notes reviewed [  x ]  other consultant notes reviewed  [ x  ]    PAST MEDICAL & SURGICAL HISTORY:  DM (diabetes mellitus)  Hypertension  Hydrocele    Allergies  No Known Allergies    ANTIMICROBIALS (past 90 days)  MEDICATIONS  (STANDING):  piperacillin/tazobactam IVPB..   25 mL/Hr IV Intermittent (10-26-23 @ 06:03)    piperacillin/tazobactam IVPB...   200 mL/Hr IV Intermittent (10-26-23 @ 00:33)    vancomycin  IVPB.   250 mL/Hr IV Intermittent (10-26-23 @ 03:39)        piperacillin/tazobactam IVPB.. 3.375 every 8 hours  vancomycin  IVPB 1250 every 12 hours    MEDICATIONS  (STANDING):  dextrose 50% Injectable 25 once  dextrose 50% Injectable 12.5 once  dextrose 50% Injectable 25 once  dextrose Oral Gel 15 once PRN  enalapril 10 daily  enoxaparin Injectable 40 every 24 hours  glucagon  Injectable 1 once  influenza  Vaccine (HIGH DOSE) 0.7 once  insulin lispro (ADMELOG) corrective regimen sliding scale  three times a day before meals  insulin lispro (ADMELOG) corrective regimen sliding scale  at bedtime    SOCIAL HISTORY:       FAMILY HISTORY:    REVIEW OF SYSTEMS  [  ] ROS unobtainable because:    [ x ] All other systems negative except as noted below: or as specified in HPI	    Constitutional:  [ ] fever [ ] chills  [ ] weight loss  [ ] weakness  Skin:  [ ] rash [ ] phlebitis	  Eyes: [ ] icterus [ ] pain  [ ] discharge	  ENMT: [ ] sore throat  [ ] thrush [ ] ulcers [ ] exudates  Respiratory: [ ] dyspnea [ ] hemoptysis [ ] cough [ ] sputum	  Cardiovascular:  [ ] chest pain [ ] palpitations [ ] edema	  Gastrointestinal:  [ ] nausea [ ] vomiting [ ] diarrhea [ ] constipation [ ] pain	  Genitourinary:  [ ] dysuria [ ] frequency [ ] hematuria [ ] discharge [ ] flank pain  [ ] incontinence  Musculoskeletal:  [ ] myalgias [ ] arthralgias [ ] arthritis  [ ] back pain  Neurological:  [ ] headache [ ] seizures  [ ] confusion/altered mental status  Psychiatric:  [ ] anxiety [ ] depression	  Hematology/Lymphatics:  [ ] lymphadenopathy  Endocrine:  [ ] adrenal [ ] thyroid  Allergic/Immunologic:	 [ ] transplant [ ] seasonal    Vital Signs Last 24 Hrs  T(F): 98.4 (10-26-23 @ 05:42), Max: 99.9 (10-25-23 @ 18:50)  Vital Signs Last 24 Hrs  HR: 97 (10-26-23 @ 05:42) (90 - 101)  BP: 122/88 (10-26-23 @ 05:42) (122/88 - 144/95)  RR: 18 (10-26-23 @ 05:42)  SpO2: 95% (10-26-23 @ 05:42) (95% - 100%)  Wt(kg): --    PHYSICAL EXAM:  Constitutional: non-toxic, no distress  HEAD/EYES: anicteric, no conjunctival injection  ENT:  supple, no thrush; ?trace erythema of R tonsil  Cardiovascular:   normal S1, S2, no murmur, no edema  Respiratory: CTAB, no w/r/r  GI:  soft, non-tender  :  no morin, no CVA tenderness  Musculoskeletal:   - +TTP over R SC Joint with overlying warmth & edema  - +TTP in R Axilla, +LAD  - +supraclavicular & submandibular, tender LAD on R, none on lef  Neurologic: awake and alert, normal strength, no focal findings  Skin:  no rash, no erythema, no phlebitis  Psychiatric:  awake, alert, appropriate mood                        13.4   19.29 )-----------( 378      ( 25 Oct 2023 19:30 )             40.1   10-25    137  |  98  |  13  ----------------------------<  114<H>  4.1   |  23  |  0.93    Ca    9.6      25 Oct 2023 19:30  Phos  3.5     10-25  Mg     1.80     10-25    TPro  7.8  /  Alb  3.8  /  TBili  0.5  /  DBili  x   /  AST  17  /  ALT  27  /  AlkPhos  51  10-25    Urinalysis Basic - ( 26 Oct 2023 07:28 )    Color: Yellow / Appearance: Clear / S.048 / pH: x  Gluc: x / Ketone: 15 mg/dL  / Bili: Negative / Urobili: 1.0 mg/dL   Blood: x / Protein: Trace mg/dL / Nitrite: Negative   Leuk Esterase: Negative / RBC: 2 /HPF / WBC 3 /HPF   Sq Epi: x / Non Sq Epi: 2 /HPF / Bacteria: Negative /HPF    MICROBIOLOGY:  - BCx x2 pending  - UCx x1 pending  - Throat Culture (10/23/2023): +Strep anginosus  - UCx (10/23/2023): 50k-99k CFU GBS      RADIOLOGY:  < from: CT Chest w/ IV Cont (10.25.23 @ 22:26) >  IMPRESSION:    1. Asymmetric widening of the right sternoclavicular joint with fluid in   the joint and cortical irregularities involving the sternoclavicular   joint and cartilage of the first rib. Softtissue density material   extending from the right lower neck to the posterior aspect of the right   clavicle. Findings are overall suspicious for septic sternoclavicular   joint and possible underlying osteomyelitis with associated spread of   infection into the lower neck and posterior to the clavicle. MRI is   recommended for further evaluation.    2. Hypodense thyroid nodules for which nonurgent follow-up ultrasound is   recommended.    3. Small right pleural effusion and mild right basilar atelectasis.    < end of copied text >   SUBJECTIVE  Mr. Valverde is a 68M with h/o T2DM (A1c 8.4% 10/23/2023), HLD, HTN, Elevated PSA who presents with 4 days of progressively worsening right anterior chest, throat, and neck pain with CT findings concerning for SC joint effusion vs infection expanding into his neck / shoulder. He started with a sore throat that was fairly mild on Monday, but then he reported gross hematuria for which he presented to his primary care provider's office (Dr. Teran). For the hematuria, he was referred for outpatient CT A/P, which he reports was performed; however, afterward, he began experiencing the progressively worsening right-sided pain for which he presented to the ER.    He denies any symptoms prior to Monday and denies sick contacts. He says that he last traveled to the [place] 2023, which is also the last time he was sexually active. He says that his PCP also sent STI testing at that time as well. He denies any recent trauma & IVDU. He did have a LEFT-sided shoulder injection in 2023.     prior hospital charts reviewed [ x ]  primary team notes reviewed [  x ]  other consultant notes reviewed  [ x  ]    PAST MEDICAL & SURGICAL HISTORY:  DM (diabetes mellitus)  Hypertension  Hydrocele    Allergies  No Known Allergies    ANTIMICROBIALS (past 90 days)  MEDICATIONS  (STANDING):  piperacillin/tazobactam IVPB..   25 mL/Hr IV Intermittent (10-26-23 @ 06:03)    piperacillin/tazobactam IVPB...   200 mL/Hr IV Intermittent (10-26-23 @ 00:33)    vancomycin  IVPB.   250 mL/Hr IV Intermittent (10-26-23 @ 03:39)        piperacillin/tazobactam IVPB.. 3.375 every 8 hours  vancomycin  IVPB 1250 every 12 hours    MEDICATIONS  (STANDING):  dextrose 50% Injectable 25 once  dextrose 50% Injectable 12.5 once  dextrose 50% Injectable 25 once  dextrose Oral Gel 15 once PRN  enalapril 10 daily  enoxaparin Injectable 40 every 24 hours  glucagon  Injectable 1 once  influenza  Vaccine (HIGH DOSE) 0.7 once  insulin lispro (ADMELOG) corrective regimen sliding scale  three times a day before meals  insulin lispro (ADMELOG) corrective regimen sliding scale  at bedtime    SOCIAL HISTORY:     no smoking, alcohol or drug abuse  no recent travel    FAMILY HISTORY:  no recent febrile illness in family members    REVIEW OF SYSTEMS  [  ] ROS unobtainable because:    [ x ] All other systems negative except as noted below: or as specified in HPI	    Constitutional:  [ ] fever [ ] chills  [ ] weight loss  [ ] weakness  Skin:  [ ] rash [ ] phlebitis	  Eyes: [ ] icterus [ ] pain  [ ] discharge	  ENMT: [ ] sore throat  [ ] thrush [ ] ulcers [ ] exudates  Respiratory: [ ] dyspnea [ ] hemoptysis [ ] cough [ ] sputum	  Cardiovascular:  [x ] chest pain [ ] palpitations [ ] edema	  Gastrointestinal:  [ ] nausea [ ] vomiting [ ] diarrhea [ ] constipation [ ] pain	  Genitourinary:  [ ] dysuria [ ] frequency [x ] hematuria [ ] discharge [ ] flank pain  [ ] incontinence  Musculoskeletal:  [ ] myalgias [ ] arthralgias [ ] arthritis  [ ] back pain  Neurological:  [ ] headache [ ] seizures  [ ] confusion/altered mental status  Psychiatric:  [ ] anxiety [ ] depression	  Hematology/Lymphatics:  [ ] lymphadenopathy  Endocrine:  [ ] adrenal [ ] thyroid  Allergic/Immunologic:	 [ ] transplant [ ] seasonal    Vital Signs Last 24 Hrs  T(F): 98.4 (10-26-23 @ 05:42), Max: 99.9 (10-25-23 @ 18:50)  Vital Signs Last 24 Hrs  HR: 97 (10-26-23 @ 05:42) (90 - 101)  BP: 122/88 (10-26-23 @ 05:42) (122/88 - 144/95)  RR: 18 (10-26-23 @ 05:42)  SpO2: 95% (10-26-23 @ 05:42) (95% - 100%)  Wt(kg): --    PHYSICAL EXAM:  Constitutional: non-toxic, no distress  HEAD: atraumatic, normocephalic  EYES: anicteric, no conjunctival injection  ENT:  supple, no thrush; ?trace erythema of R tonsil  Cardiovascular:   normal S1, S2, no murmur, no edema  Respiratory: CTAB, no w/r/r  GI:  soft, non-tender  :  no morin, no CVA tenderness  Musculoskeletal:   - +TTP over R SC Joint with overlying warmth & edema  - +TTP in R Axilla, +LAD  - +supraclavicular & submandibular, tender LAD on R, none on lef  Neurologic: awake and alert, normal strength, no focal findings  Skin:  no rash, no erythema, no phlebitis  Psychiatric:  awake, alert, appropriate mood                        13.4   19.29 )-----------( 378      ( 25 Oct 2023 19:30 )             40.1   10-25    137  |  98  |  13  ----------------------------<  114<H>  4.1   |  23  |  0.93    Ca    9.6      25 Oct 2023 19:30  Phos  3.5     10-25  Mg     1.80     10-25    TPro  7.8  /  Alb  3.8  /  TBili  0.5  /  DBili  x   /  AST  17  /  ALT  27  /  AlkPhos  51  10-25    Urinalysis Basic - ( 26 Oct 2023 07:28 )    Color: Yellow / Appearance: Clear / S.048 / pH: x  Gluc: x / Ketone: 15 mg/dL  / Bili: Negative / Urobili: 1.0 mg/dL   Blood: x / Protein: Trace mg/dL / Nitrite: Negative   Leuk Esterase: Negative / RBC: 2 /HPF / WBC 3 /HPF   Sq Epi: x / Non Sq Epi: 2 /HPF / Bacteria: Negative /HPF    MICROBIOLOGY:  - BCx x2 pending  - UCx x1 pending  - Throat Culture (10/23/2023): +Strep anginosus  - UCx (10/23/2023): 50k-99k CFU GBS      RADIOLOGY:  < from: CT Chest w/ IV Cont (10.25.23 @ 22:26) >  IMPRESSION:    1. Asymmetric widening of the right sternoclavicular joint with fluid in   the joint and cortical irregularities involving the sternoclavicular   joint and cartilage of the first rib. Softtissue density material   extending from the right lower neck to the posterior aspect of the right   clavicle. Findings are overall suspicious for septic sternoclavicular   joint and possible underlying osteomyelitis with associated spread of   infection into the lower neck and posterior to the clavicle. MRI is   recommended for further evaluation.    2. Hypodense thyroid nodules for which nonurgent follow-up ultrasound is   recommended.    3. Small right pleural effusion and mild right basilar atelectasis.    < end of copied text >   SUBJECTIVE  Mr. Valverde is a 68M with h/o T2DM (A1c 8.4% 10/23/2023), HLD, HTN, Elevated PSA who presents with 4 days of progressively worsening right anterior chest, throat, and neck pain with CT findings concerning for SC joint effusion vs infection expanding into his neck / shoulder. He started with a sore throat that was fairly mild on Monday, but then he reported gross hematuria for which he presented to his primary care provider's office (Dr. Teran). For the hematuria, he was referred for outpatient CT A/P, which he reports was performed; however, afterward, he began experiencing the progressively worsening right-sided pain for which he presented to the ER.    He denies any symptoms prior to Monday and denies sick contacts. He says that he last traveled to the [place] 2023, which is also the last time he was sexually active. He says that his PCP also sent STI testing at that time as well. He denies any recent trauma & IVDU. He did have a LEFT-sided shoulder injection in 2023.     prior hospital charts reviewed [ x ]  primary team notes reviewed [  x ]  other consultant notes reviewed  [ x  ]    PAST MEDICAL & SURGICAL HISTORY:  DM (diabetes mellitus)  Hypertension  Hydrocele    Allergies  No Known Allergies    ANTIMICROBIALS (past 90 days)  MEDICATIONS  (STANDING):  piperacillin/tazobactam IVPB..   25 mL/Hr IV Intermittent (10-26-23 @ 06:03)    piperacillin/tazobactam IVPB...   200 mL/Hr IV Intermittent (10-26-23 @ 00:33)    vancomycin  IVPB.   250 mL/Hr IV Intermittent (10-26-23 @ 03:39)        piperacillin/tazobactam IVPB.. 3.375 every 8 hours  vancomycin  IVPB 1250 every 12 hours    MEDICATIONS  (STANDING):  dextrose 50% Injectable 25 once  dextrose 50% Injectable 12.5 once  dextrose 50% Injectable 25 once  dextrose Oral Gel 15 once PRN  enalapril 10 daily  enoxaparin Injectable 40 every 24 hours  glucagon  Injectable 1 once  influenza  Vaccine (HIGH DOSE) 0.7 once  insulin lispro (ADMELOG) corrective regimen sliding scale  three times a day before meals  insulin lispro (ADMELOG) corrective regimen sliding scale  at bedtime    SOCIAL HISTORY:    from [place], , lives with wife, no pets at home   no smoking, alcohol or drug abuse  went to [place] last month    FAMILY HISTORY:  no recent febrile illness in family members    REVIEW OF SYSTEMS  [  ] ROS unobtainable because:    [ x ] All other systems negative except as noted below: or as specified in HPI	    Constitutional:  [ ] fever [ ] chills  [ ] weight loss  [ ] weakness  Skin:  [ ] rash [ ] phlebitis	  Eyes: [ ] icterus [ ] pain  [ ] discharge	  ENMT: [ ] sore throat  [ ] thrush [ ] ulcers [ ] exudates  Respiratory: [ ] dyspnea [ ] hemoptysis [ ] cough [ ] sputum	  Cardiovascular:  [x ] chest pain [ ] palpitations [ ] edema	  Gastrointestinal:  [ ] nausea [ ] vomiting [ ] diarrhea [ ] constipation [ ] pain	  Genitourinary:  [ ] dysuria [ ] frequency [x ] hematuria [ ] discharge [ ] flank pain  [ ] incontinence  Musculoskeletal:  [ ] myalgias [ ] arthralgias [ ] arthritis  [ ] back pain  Neurological:  [ ] headache [ ] seizures  [ ] confusion/altered mental status  Psychiatric:  [ ] anxiety [ ] depression	  Hematology/Lymphatics:  [ ] lymphadenopathy  Endocrine:  [ ] adrenal [ ] thyroid  Allergic/Immunologic:	 [ ] transplant [ ] seasonal    Vital Signs Last 24 Hrs  T(F): 98.4 (10-26-23 @ 05:42), Max: 99.9 (10-25-23 @ 18:50)  Vital Signs Last 24 Hrs  HR: 97 (10-26-23 @ 05:42) (90 - 101)  BP: 122/88 (10-26-23 @ 05:42) (122/88 - 144/95)  RR: 18 (10-26-23 @ 05:42)  SpO2: 95% (10-26-23 @ 05:42) (95% - 100%)  Wt(kg): --    PHYSICAL EXAM:  Constitutional: non-toxic, no distress  HEAD: atraumatic, normocephalic  EYES: anicteric, no conjunctival injection  ENT:  supple, no thrush; ?trace erythema of R tonsil  Cardiovascular:   normal S1, S2, no murmur, no edema  Respiratory: CTAB, no w/r/r  GI:  soft, non-tender  :  no morin, no CVA tenderness  Musculoskeletal:   - +TTP over R SC Joint with overlying warmth & edema  - +TTP in R Axilla, +LAD  - +supraclavicular & submandibular, tender LAD on R, none on lef  Neurologic: awake and alert, normal strength, no focal findings  Skin:  no rash, no erythema, no phlebitis  Psychiatric:  awake, alert, appropriate mood                        13.4   19.29 )-----------( 378      ( 25 Oct 2023 19:30 )             40.1   10    137  |  98  |  13  ----------------------------<  114<H>  4.1   |  23  |  0.93    Ca    9.6      25 Oct 2023 19:30  Phos  3.5     10-25  Mg     1.80     10-25    TPro  7.8  /  Alb  3.8  /  TBili  0.5  /  DBili  x   /  AST  17  /  ALT  27  /  AlkPhos  51  10-25    Urinalysis Basic - ( 26 Oct 2023 07:28 )    Color: Yellow / Appearance: Clear / S.048 / pH: x  Gluc: x / Ketone: 15 mg/dL  / Bili: Negative / Urobili: 1.0 mg/dL   Blood: x / Protein: Trace mg/dL / Nitrite: Negative   Leuk Esterase: Negative / RBC: 2 /HPF / WBC 3 /HPF   Sq Epi: x / Non Sq Epi: 2 /HPF / Bacteria: Negative /HPF    MICROBIOLOGY:  - BCx x2 pending  - UCx x1 pending  - Throat Culture (10/23/2023): +Strep anginosus  - UCx (10/23/2023): 50k-99k CFU GBS      RADIOLOGY:  < from: CT Chest w/ IV Cont (10.25.23 @ 22:26) >  IMPRESSION:    1. Asymmetric widening of the right sternoclavicular joint with fluid in   the joint and cortical irregularities involving the sternoclavicular   joint and cartilage of the first rib. Softtissue density material   extending from the right lower neck to the posterior aspect of the right   clavicle. Findings are overall suspicious for septic sternoclavicular   joint and possible underlying osteomyelitis with associated spread of   infection into the lower neck and posterior to the clavicle. MRI is   recommended for further evaluation.    2. Hypodense thyroid nodules for which nonurgent follow-up ultrasound is   recommended.    3. Small right pleural effusion and mild right basilar atelectasis.    < end of copied text >

## 2023-10-26 NOTE — H&P ADULT - ATTENDING COMMENTS
This is a 69 y/o M with pmhx of HTN, DM2 presented to the ED for new onset pain on the R upper chest area. He noted that he was getting PT for his L shoulder however the pain from the L shoulder traveled across to the R sternoclavical area and to the R shoulder. This happened 3 days ago. Since then, the pain has been getting worse. Denies fevers at home. Denies trauma to the area. The patient also endorsed that he was suppose to get a urine test because his urine was bloody, also had endorsed dysuria for the last 3 days but was resolving. He couldn’t get the test done because the R sternoclavicular joint was so painful that he had to come to the ER for eval. He also reports sore throat for the last 3 days. Denies SOB or respiratory distress. Has no issues with swallowing secretions.    Physical exam findings show a well appearing male, NAD, comfortable at bedside. Lungs CTA b/l, no wheezing, cardiac s1s2 RRR no murmurs, abdomen soft nontender to palpation, there is swelling on the R side of the clavicular joint. Limited ROM of the R shoulder due to pain, no LE edema noted.    Labs                        13.4   19.29 )-----------( 378      ( 25 Oct 2023 19:30 )             40.1       10-25    137  |  98  |  13  ----------------------------<  114<H>  4.1   |  23  |  0.93    Ca    9.6      25 Oct 2023 19:30  Phos  3.5     10-25  Mg     1.80     10-25    TPro  7.8  /  Alb  3.8  /  TBili  0.5  /  DBili  x   /  AST  17  /  ALT  27  /  AlkPhos  51  10-25                19:30 - VBG - pH: 7.42  | pCO2: 41    | pO2: 45    | Lactate: 1.2        Urinalysis Basic - ( 25 Oct 2023 19:30 )    Color: x / Appearance: x / SG: x / pH: x  Gluc: 114 mg/dL / Ketone: x  / Bili: x / Urobili: x   Blood: x / Protein: x / Nitrite: x   Leuk Esterase: x / RBC: x / WBC x   Sq Epi: x / Non Sq Epi: x / Bacteria: x            CXR: As per my read - no opacities noted, Will wait for prelim/official read    EKG: As per my read - NSR QTc 450 ms    CT showed osteomyelitis of the R sternoclavicular joint    The patient is admitted for sternoclavicular joint osteomyelitis. The patient has evidence of sepsis with elevated WBC and tachycardia. Will need ortho evaluation and IR consult for drainage of the joint. Ortho pending recommendations, to decided of CT surgery consult is needed. I also have spoken with radiology about the CT chest, the R shoulder appears normal and a dedicated R shoulder CT scan is not needed at this time, MRI can be considered if necessary or if the pain continues. Start zosyn and vanco for empiric abx coverage. The patient also has evidence of aryepiglottic thickening. The patient has no signs of respiratory distress. Monitor with Q4hr Pulse ox checks for now. ENT consult for scoping. The patient on outpatient had Urine culture done showing group b strep. Abx as above.

## 2023-10-26 NOTE — H&P ADULT - NSHPPHYSICALEXAM_GEN_ALL_CORE
T(C): 36.9 (10-26-23 @ 01:53), Max: 37.7 (10-25-23 @ 18:50)  HR: 96 (10-26-23 @ 01:53) (95 - 101)  BP: 130/97 (10-26-23 @ 01:53) (125/97 - 141/88)  RR: 17 (10-26-23 @ 01:53) (16 - 18)  SpO2: 99% (10-26-23 @ 01:53) (95% - 100%)    CONSTITUTIONAL: Well groomed, no apparent distress  EYES: PERRLA and symmetric, EOMI, No conjunctival or scleral injection, non-icteric  ENMT: Oral mucosa with moist membranes. Normal dentition; no pharyngeal injection or exudates             NECK: Supple, symmetric and without tracheal deviation   RESP: No respiratory distress, no use of accessory muscles; CTA b/l, no WRR  CV: RRR, +S1S2, no MRG; no JVD; no peripheral edema  GI: Soft, NT, ND, no rebound, no guarding; no palpable masses; no hepatosplenomegaly; no hernia palpated  LYMPH: No cervical LAD or tenderness; no axillary LAD or tenderness; no inguinal LAD or tenderness  MSK: Normal gait; No digital clubbing or cyanosis; examination of the (head/neck/spine/ribs/pelvis, RUE, LUE, RLE, LLE) without misalignment,            Normal ROM without pain, no spinal tenderness, normal muscle strength/tone  SKIN: No rashes or ulcers noted; no subcutaneous nodules or induration palpable  NEURO: CN II-XII intact; normal reflexes in upper and lower extremities, sensation intact in upper and lower extremities b/l to light touch   PSYCH: Appropriate insight/judgment; A+O x 3, mood and affect appropriate, recent/remote memory intact T(C): 36.9 (10-26-23 @ 01:53), Max: 37.7 (10-25-23 @ 18:50)  HR: 96 (10-26-23 @ 01:53) (95 - 101)  BP: 130/97 (10-26-23 @ 01:53) (125/97 - 141/88)  RR: 17 (10-26-23 @ 01:53) (16 - 18)  SpO2: 99% (10-26-23 @ 01:53) (95% - 100%)    CONSTITUTIONAL: Well groomed,appears comfortable  EYES: PERRLA and symmetric, EOMI, No conjunctival or scleral injection, non-icteric  ENMT: Oral mucosa with moist membranes. Normal dentition; no pharyngeal injection or exudates  NECK: large submandibular lymph node appreciated   RESP: No respiratory distress, no use of accessory muscles; CTA b/l, no WRR  CV: RRR, +S1S2, no MRG; no JVD; no peripheral edema  GI: Soft, NT, Distended, no rebound, no guarding; no palpable masses; no hepatosplenomegaly;  LYMPH: Submandibular lymphadenopathy  MSK: Patient with improved ROM of Right shoulder after Morphine, mild tenderness elicited on exam, Mild swelling noted, no obvious erythema  SKIN: No rashes or ulcers noted; no subcutaneous nodules or induration palpable  NEURO: CN II-XII intact; normal reflexes in upper and lower extremities, sensation intact in upper and lower extremities b/l to light touch   PSYCH: Appropriate insight/judgment; A+O x 3, mood and affect appropriate, recent/remote memory intact

## 2023-10-26 NOTE — H&P ADULT - ASSESSMENT
68M w/ HTN T2DM presents with R sided chest swelling/neck pain since monday 68M w/HTN T2DM, HLD, Elevated PSA/Bilateral Hydrocele s/p epididymitis treatment presents possible septic ster 68M w/HTN T2DM, HLD, Elevated PSA/Bilateral Hydrocele s/p epididymitis treatment presents with concern for septic arthritis iso radiographic evidence and severe shoulder pain with possible source being GBS UTI; now started on IV Abx, pending IR evaluation.

## 2023-10-26 NOTE — ED ADULT NURSE REASSESSMENT NOTE - STATUS
awaits dispo As per handoff rpt Pt with c/o chest pain , throat and rt arm pain, elevated WBC  Antibiotics intiated

## 2023-10-26 NOTE — CHART NOTE - NSCHARTNOTEFT_GEN_A_CORE
Pt booked for OR tomorrow  R SC joint irrigation and debridement w Dr Loomis  appreciate medical optimization  NPO P MN  valid coagulation profile, type and screen  pt agreeable, will obtain consent  d/w medicine team    Gely THOMPSON [id number] Pt booked for OR tomorrow  R SC joint irrigation and debridement w Dr Loomis  appreciate medical optimization  NPO P MN  valid coagulation profile, type and screen  pt agreeable, will obtain consent  d/w medicine team    Gely THOMPSON [id number]      plan for OR tomorrow for SCJ debridement possible resection of clavicle/manubrium/ribs.   discussed possible need for VAC vs open wound  .   discussed risks of surgery including and not limited to bleeding, infection, scarring, injury to surrounding structures.   patient agreeable to proceed.

## 2023-10-26 NOTE — PATIENT PROFILE ADULT - FUNCTIONAL ASSESSMENT - DAILY ACTIVITY SECTION LABEL
" MATERNAL FETAL MEDICINE IS FOLLOWING THE PATIENT  FOR   1. Pre-eclampsia in second trimester (MFM)    2. Pre-existing type 2 diabetes mellitus (MFM)    3. Hypothyroidism (MFM)        SUBJECTIVE:   The patient reports Doing well, since last visit    CURRENT MEDICATIONS  Current Outpatient Prescriptions   Medication Sig Dispense Refill   â¢ insulin aspart (NOVOLOG FLEXPEN) 100 UNIT/ML pen-injector Patient to inject SubQ- 90 units with breakfast, 40 units with lunch and 98 units with evening meal 75 mL 3   â¢ insulin NPH (NOVOLIN N) 100 UNIT/ML injectable suspension Patient to inject SubQ- 100 units with breakfast and 105 units at bedtime 60 mL 3   â¢ Insulin Pen Needle (B-D U/F PEN NEEDLE) 31G X 5 MM Misc Use to inject insulin SubQ 4 times daily as directed 120 each 12   â¢ blood glucose test strips (RELION PRIME TEST) Check blood sugar 4 times daily. 150 strip 11   â¢ Nutritional Supplements (GLUCERNA) Liquid Take 1 each by mouth nightly. Indications: Diabetes 30 Bottle 4   â¢ levothyroxine (SYNTHROID, LEVOTHROID) 175 MCG tablet By mouth take one tablet daily of BRAND Synthroid. 30 tablet 4   â¢ blood glucose test strips (RELION PRIME TEST) Patient to check blood sugar 4 times daily as directed 100 strip 0   â¢ aspirin 81 MG tablet Take 1 tablet by mouth daily. â¢ Methylcellulose, Laxative, (CITRUCEL PO) Take by mouth 2 times daily. â¢ Loratadine (CLARITIN) 10 MG Cap Take 10 mg by mouth daily. 30 capsule 2   â¢ Insulin Syringe-Needle U-100 30G X 5/16"" 1 ML Misc Patient to inject insulin SubQ 4 times daily as directed. 120 each 6   â¢ Prenatal-FeCbn-FeAspGl-FA-Omeg (ULTIMATECARE ONE) 27-1 MG Cap Take 1 capsule by mouth daily. 90 capsule 3   â¢ vitamin D, Cholecalciferol, 1000 UNITS capsule Take 1 capsule by mouth daily.  Take 3 tablets by mouth daily, total dose 3000 units daily 90 capsule 11   â¢ albuterol (PROAIR HFA) 108 (90 BASE) MCG/ACT inhaler Inhale 2 puffs into the lungs every 4 hours as needed for Shortness of " "Breath or Wheezing. 1 Inhaler 12   â¢ Lancets 28G MISC To use as directed to check blood sugars 100 each 5     No current facility-administered medications for this visit. Allergies as of 03/23/2017 Zen Lombardo as Reviewed 03/23/2017   Allergen Reaction Noted   â¢ Amoxicillin RASH 07/09/2013   â¢ Augmentin [amoxicillin-pot clavulanate] RASH 06/03/2013   â¢ Byetta HIVES and RASH 05/28/2015   â¢ Lisinopril Cough 02/05/2013   â¢ Nicorette SWELLING 03/26/2015         PHYSICAL EXAMINATION:  VITAL SIGNS:    Visit Vitals   â¢ /72   â¢ Ht 5' 5"" (1.651 m)   â¢ Wt 102.1 kg   â¢ LMP 08/01/2016 (Exact Date)   â¢ BMI 37.44 kg/m2       LABORATORY RESULTS SINCE LAST VISIT:      TODAY'S ULTRASOUND SHOWED:   1. Biophysical 8 out of 8    ASSESSMENT AND PLAN: A 29w3d pregnancy being followed by [facility] for   PROBLEM 1.  1. Pre-eclampsia in second trimester (M)    2. Pre-existing type 2 diabetes mellitus ([facility])    3. Hypothyroidism ([facility])      PLAN;  1. Continued twice weekly assessment per protocol  2. Continue to follow-up with Katherine Rinne, MD for routine obstetrical care    Total time of today's office visit 10 minutes, greater than 50% spent face to face discussion on  A.  Reviewing with the patient the care plan noted above  B. patient aware to call for cerebral or epigastric symptoms    " .

## 2023-10-26 NOTE — CONSULT NOTE ADULT - ASSESSMENT
69 y/o M admitted to [facility] for right septic SC joint. CT neck shows asymmetrical AE fold and bilateral thyroid nodules up to 1.7cm on the left. Patient is asymptomatic, FOE within normal limit, b/l vocal cord mobile with patent airway.

## 2023-10-26 NOTE — CONSULT NOTE ADULT - PROBLEM SELECTOR RECOMMENDATION 2
- Hoarseness likely due to laryngitis  - Conservative management per primary team  - Consider increase hydration and rest

## 2023-10-26 NOTE — PROGRESS NOTE ADULT - SUBJECTIVE AND OBJECTIVE BOX
**  Ilir Bourgeois  PGY-1 Medicine  **    PATRICK KAMARA ([id number])    Patient is a 68y old  Male who presents with a chief complaint of Septic artheritis (26 Oct 2023 07:00)      INTERVAL HPI/OVERNIGHT EVENTS:   Admitted overnight.     PHYSICAL EXAM:  CONSTITUTIONAL: Well groomed,appears comfortable  EYES: PERRLA and symmetric, EOMI, No conjunctival or scleral injection, non-icteric  ENMT: Oral mucosa with moist membranes. Normal dentition; no pharyngeal injection or exudates  NECK: large submandibular lymph node appreciated   RESP: No respiratory distress, no use of accessory muscles; CTA b/l, no WRR  CV: RRR, +S1S2, no MRG; no JVD; no peripheral edema  GI: Soft, NT, Distended, no rebound, no guarding; no palpable masses; no hepatosplenomegaly;  LYMPH: Submandibular lymphadenopathy  MSK: Patient with improved ROM of Right shoulder after Morphine, mild tenderness elicited on exam, Mild swelling noted, no obvious erythema  SKIN: No rashes or ulcers noted; no subcutaneous nodules or induration palpable  NEURO: CN II-XII intact; normal reflexes in upper and lower extremities, sensation intact in upper and lower extremities b/l to light touch   PSYCH: Appropriate insight/judgment; A+O x 3, mood and affect appropriate, recent/remote memory intact    LABS:                          13.4   19.29 )-----------( 378      ( 25 Oct 2023 19:30 )             40.1     10-25    137  |  98  |  13  ----------------------------<  114<H>  4.1   |  23  |  0.93    Ca    9.6      25 Oct 2023 19:30  Phos  3.5     10-25  Mg     1.80     10-25    TPro  7.8  /  Alb  3.8  /  TBili  0.5  /  DBili  x   /  AST  17  /  ALT  27  /  AlkPhos  51  10-25          Urinalysis Basic - ( 25 Oct 2023 19:30 )    Color: x / Appearance: x / SG: x / pH: x  Gluc: 114 mg/dL / Ketone: x  / Bili: x / Urobili: x   Blood: x / Protein: x / Nitrite: x   Leuk Esterase: x / RBC: x / WBC x   Sq Epi: x / Non Sq Epi: x / Bacteria: x        Lactate Trend        CAPILLARY BLOOD GLUCOSE      POCT Blood Glucose.: 267 mg/dL (26 Oct 2023 04:56)      Medications:  dextrose 5%. 1000 milliLiter(s) IV Continuous <Continuous>  dextrose 5%. 1000 milliLiter(s) IV Continuous <Continuous>  dextrose 50% Injectable 25 Gram(s) IV Push once  dextrose 50% Injectable 12.5 Gram(s) IV Push once  dextrose 50% Injectable 25 Gram(s) IV Push once  dextrose Oral Gel 15 Gram(s) Oral once PRN  glucagon  Injectable 1 milliGRAM(s) IntraMuscular once  influenza  Vaccine (HIGH DOSE) 0.7 milliLiter(s) IntraMuscular once  insulin lispro (ADMELOG) corrective regimen sliding scale   SubCutaneous three times a day before meals  insulin lispro (ADMELOG) corrective regimen sliding scale   SubCutaneous at bedtime  piperacillin/tazobactam IVPB.. 3.375 Gram(s) IV Intermittent every 8 hours  vancomycin  IVPB 1250 milliGRAM(s) IV Intermittent every 12 hours      RADIOLOGY & ADDITIONAL TESTS were viewed by me personally.

## 2023-10-26 NOTE — ED ADULT NURSE REASSESSMENT NOTE - NS ED NURSE REASSESS COMMENT FT1
Report given to Vanda THOMPSON, patient resting comfortably in bed, meds given as per order. Patient awaiting transport.

## 2023-10-26 NOTE — H&P ADULT - PROBLEM SELECTOR PLAN 3
Patient on Jardiance 25 qd and Metformin 1000 BID    Plan  LSS CT Neck/Soft tissue noted Asymmetric thickening of the R aryepiglottic fold; possible mass    Plan:  Consult ENT for direct visualization for further evaluation CT Neck/Soft tissue noted Asymmetric thickening of the R aryepiglottic fold; possible mass  Denies any respiratory distress  Airway appears patent; no obvious stridorous breathing  o2 sats stable;     Plan:  Consult ENT for direct visualization for further evaluation

## 2023-10-26 NOTE — H&P ADULT - PROBLEM SELECTOR PLAN 6
Diet: DASH diet  Psych/Sleep: None JEOVANNY  GI: None JEOVANNY  DVT ppx: HSQ  Code Status: Full Code  Dispo:

## 2023-10-26 NOTE — H&P ADULT - HISTORY OF PRESENT ILLNESS
68M with hx of  68M w/HTN T2DM, HLD, Elevated PSA/Bilateral Hydrocele s/p epididymitis treatment presents with R sided chest swelling/neck pain since monday,      Patient reports that the R sided pain and swelling seemed to start on Monday and he went to his PCP, Dr. Narvaez for R sided neck discomfort and shoulder pain, with blood cultures drawn on 10/23 showing NGTD, ESR elevated to 63, and Urine culture positive for GBS. He reports never having this kind of pain before except in his left shoulder which was being treated for bursitis, He says there was pain that shot from his L shoulder to his Right and ever since then he was in pain. He reported it was mild and manageable at first, but was refractory to meloxicam and tylenol. On Wednesday, he had a scheduled CT of his pelvis and reports soon before his CT scan, his pain intensified severely to 10/10 and right after the scan, he went to [facility]. He reports pain is constant, and 14/10; worsens with movement of his shoulder and his neck. Also endorses some burning across his shoulder. He has never had anything severe like this before. Denies any recent trauma, any recent URI symptoms, any history of shoulder surgeries.     ED Course: 97.9, 141/88, 101, 18/min 100% RA  Patient was started on Vanc/Zosyn, Morphine 4mg IV x1,   CT A/P Showed signs concerning for potential sternoclavicular joint septic arthritis  Hypodense thyroid nodules as well.    68M w/HTN T2DM, HLD, Elevated PSA/Bilateral Hydrocele s/p epididymitis treatment presents with R sided chest swelling/neck pain since monday,      Patient reports that the R sided pain and swelling seemed to start on Monday and he went to his PCP, Dr. Narvaez for R sided neck discomfort and shoulder pain, with blood cultures drawn on 10/23 showing NGTD, ESR elevated to 63, and Urine culture positive for GBS. He reports never having this kind of pain before except in his left shoulder which was being treated for bursitis, He says there was pain that shot from his L shoulder to his Right and ever since then he was in pain. He reported it was mild and manageable at first, but was refractory to meloxicam and tylenol. On Wednesday, he had a scheduled CT of his pelvis and reports soon before his CT scan, his pain intensified severely to 10/10 and right after the scan, he went to [facility]. He reports pain is constant, and 14/10; worsens with movement of his shoulder and his neck. Also endorses some burning across his shoulder. He endorses some burning with urination for the past 3 days as well. He has never had anything severe like this before. Denies any recent trauma, any recent URI symptoms, any history of shoulder surgeries.     ED Course: 97.9, 141/88, 101, 18/min 100% RA  Patient was started on Vanc/Zosyn, Morphine 4mg IV x1,   CT A/P Showed signs concerning for potential sternoclavicular joint septic arthritis  Hypodense thyroid nodules as well.

## 2023-10-26 NOTE — H&P ADULT - PROBLEM SELECTOR PLAN 4
Home: Enalapril 10 qd    Plan:  C/W Enalapril 10 qd Patient on Jardiance 25 qd and Metformin 1000 BID    Plan  LSS

## 2023-10-26 NOTE — CONSULT NOTE ADULT - SUBJECTIVE AND OBJECTIVE BOX
CC: Upper airway evaluation    HPI: 68M with a PMHx of HTN, T2DM, HLD, Elevated PSA/Bilateral Hydrocele s/p epididymitis treatment presents with right upper chest wall pain and swelling. CT done concerns for Right septic SC joint. orthopedic, CTS and ID following. ENT consulted for upper airway evaluation due to imaging finding concerns for asymmetrical AE fold. Patient seen and examined at bedside.       PAST MEDICAL & SURGICAL HISTORY:  DM (diabetes mellitus)      Hypertension      Hydrocele        Allergies    No Known Allergies    Intolerances      MEDICATIONS  (STANDING):  dextrose 5%. 1000 milliLiter(s) (50 mL/Hr) IV Continuous <Continuous>  dextrose 5%. 1000 milliLiter(s) (100 mL/Hr) IV Continuous <Continuous>  dextrose 50% Injectable 25 Gram(s) IV Push once  dextrose 50% Injectable 12.5 Gram(s) IV Push once  dextrose 50% Injectable 25 Gram(s) IV Push once  enalapril 10 milliGRAM(s) Oral daily  enoxaparin Injectable 40 milliGRAM(s) SubCutaneous every 24 hours  glucagon  Injectable 1 milliGRAM(s) IntraMuscular once  influenza  Vaccine (HIGH DOSE) 0.7 milliLiter(s) IntraMuscular once  insulin lispro (ADMELOG) corrective regimen sliding scale   SubCutaneous three times a day before meals  insulin lispro (ADMELOG) corrective regimen sliding scale   SubCutaneous at bedtime  piperacillin/tazobactam IVPB.. 3.375 Gram(s) IV Intermittent every 8 hours  vancomycin  IVPB 1250 milliGRAM(s) IV Intermittent every 12 hours    MEDICATIONS  (PRN):  dextrose Oral Gel 15 Gram(s) Oral once PRN Blood Glucose LESS THAN 70 milliGRAM(s)/deciliter      Social History: Denies substance use, no ETOH use and no smoking    Family history: No pertinent family history in first degree relatives    ROS:   ENT: all negative except as noted in HPI   CV: denies palpitations  Pulm: denies SOB, cough, hemoptysis  GI: denies change in appetite, indigestion, n/v  : denies pertinent urinary symptoms, urgency  Neuro: denies numbness/tingling, loss of sensation  Psych: denies anxiety  MS: denies muscle weakness, instability  Heme: denies easy bruising or bleeding  Endo: denies heat/cold intolerance, excessive sweating  Vascular: denies LE edema    Vital Signs Last 24 Hrs  T(C): 36.9 (26 Oct 2023 05:42), Max: 37.7 (25 Oct 2023 18:50)  T(F): 98.4 (26 Oct 2023 05:42), Max: 99.9 (25 Oct 2023 18:50)  HR: 97 (26 Oct 2023 05:42) (90 - 101)  BP: 122/88 (26 Oct 2023 05:42) (122/88 - 144/95)  BP(mean): --  RR: 18 (26 Oct 2023 05:42) (16 - 18)  SpO2: 95% (26 Oct 2023 05:42) (95% - 100%)    Parameters below as of 26 Oct 2023 05:42  Patient On (Oxygen Delivery Method): room air                              13.4   19.29 )-----------( 378      ( 25 Oct 2023 19:30 )             40.1    10-25    137  |  98  |  13  ----------------------------<  114<H>  4.1   |  23  |  0.93    Ca    9.6      25 Oct 2023 19:30  Phos  3.5     10-25  Mg     1.80     10-25    TPro  7.8  /  Alb  3.8  /  TBili  0.5  /  DBili  x   /  AST  17  /  ALT  27  /  AlkPhos  51  10-25       PHYSICAL EXAM:  Gen: NAD  Skin: No rashes, bruises, or lesions  Head: Normocephalic, Atraumatic  Face: no edema, erythema, or fluctuance. Parotid glands soft without mass  Eyes: no scleral injection  Ears: No mastoid tenderness, erythema, or ear bulging  Nose: Nares bilaterally patent, no discharge  Mouth: No stridor, no drooling, no trismus.  Mucosa moist, tongue/uvula midline, oropharynx clear  Neck: Flat, supple, no lymphadenopathy, trachea midline, no masses  Lymphatic: No lymphadenopathy  Resp: breathing easily, no stridor  CV: no peripheral edema/cyanosis  GI: nondistended   Peripheral vascular: no JVD or edema  Neuro: facial nerve intact, no facial droop    Fiberoptic Indirect laryngoscopy:  (Scope #2 used)    IMAGING/ADDITIONAL STUDIES:   < from: CT Neck Soft Tissue w/ IV Cont (10.25.23 @ 22:26) >  ACC: [id number] EXAM:  CT NECK SOFT TISSUE IC   ORDERED BY: JUAN A BLOCH     PROCEDURE DATE:  10/25/2023          INTERPRETATION:  CLINICAL INFORMATION: Neck pain, pharyngitis.    TECHNIQUE: CT of the neck were obtained without the administration of   intravenous contrast.  Coronal and sagittal reformats were performed. 90   cc of Omnipaque 350 was administered. 10 cc discarded.    COMPARISON: CT chest concurrently performed    FINDINGS:  The parotid and submandibular glands are unremarkable. There is a 0.9 cm   hypodense and suspected 1.7 cm hypodense left thyroid nodule.    There is asymmetric thickening of the right aryepiglottic fold with   associated laryngocele (310, 67). Underlying mass is not excluded.    No acute osseous abnormality is identified. Multilevel degenerative   changes are noted in the cervical spine.    There is a small left maxillary sinus retention cyst versus polyp. The   paranasal sinuses are otherwise well aerated. The mastoid air cells are   well aerated.    There is low density material is noted in the lower neck anterior and   between the right thyroid lobe and internal jugular vein (310, 48). This   extends to the soft tissues posterior to the right clavicle. See   separately dictated chest CT for additional findings.    IMPRESSION:    1. Low-density soft tissue in the right lower neck extending into the   chest, suspected to be related to sternoclavicular infection, described   in more detail on separately dictated chest CT of the same day.    2. Asymmetric thickening of the right aryepiglottic fold. Underlying mass   is not excluded. Follow-up with ENT and direct visualization with scope   is recommended.    3. Bilateral thyroid nodules measuring up to 1.7 cm on the left.   Correlation with nonemergent follow-up ultrasound is recommended.    Dr. BERE Paez discussed these findings with Dr. Abisai MD on 10/26/2023   1:57 AM, with verbal confirmation.    --- End of Report ---          ALEX PAEZ MD; Resident Radiologist  This document has been electronically signed.  DANY HAWKINS MD; Attending Radiologist  This document has been electronically signed. Oct 26 2023  1:58AM    < end of copied text >  < from: CT Chest w/ IV Cont (10.25.23 @ 22:26) >  ACC: [id number] EXAM:  CT CHEST IC   ORDERED BY: JUAN AEN BLOCH     PROCEDURE DATE:  10/25/2023          INTERPRETATION:  CLINICAL INFORMATION: Right chest swelling under   clavicle.    COMPARISON: CT neck same day    CONTRAST/COMPLICATIONS:  IV Contrast: IV contrast documented in unlinked concurrent exam  Oral Contrast: NONE  Complications: None reported at time of study completion    PROCEDURE:  CT scan of the chest was obtained with intravenous contrast.    FINDINGS:    LYMPH NODES: No enlarged mediastinal, axillary, or hilar lymph nodes.    HEART/VASCULATURE: The heart is normal in size. No pericardial effusion.   Ascending thoracic aorta measures 4.0 cm at the level of the main   pulmonary artery. Coronary calcifications.    AIRWAYS/LUNGS/PLEURA: The central airways are patent. Elevated right   hemidiaphragm. Small right pleural effusion and mild right lower lobe   atelectasis. No pneumothorax.    UPPER ABDOMEN: Unremarkable.    BONES/SOFT TISSUES: Bilateral hypodense lesions in the thyroid, measuring   up to 0.9 cm in the right lobe. Degenerative changes. There is asymmetric   widening of the right sternoclavicular joint with fluid attenuation in   the joint space (304, 34). Low density soft tissue is noted in the lower   neck between the right internal jugular vein and right thyroid gland   (example 3 4, 22) and extends posterior to the right clavicle. There is   mild cortical irregularity at the right sternoclavicular joint as well as   at the superior aspect of the cartilageof the right first rib (example   603, 47).    IMPRESSION:    1. Asymmetric widening of the right sternoclavicular joint with fluid in   the joint and cortical irregularities involving the sternoclavicular   joint and cartilage of the first rib. Softtissue density material   extending from the right lower neck to the posterior aspect of the right   clavicle. Findings are overall suspicious for septic sternoclavicular   joint and possible underlying osteomyelitis with associated spread of   infection into the lower neck and posterior to the clavicle. MRI is   recommended for further evaluation.    2. Hypodense thyroid nodules for which nonurgent follow-up ultrasound is   recommended.    3. Small right pleural effusion and mild right basilar atelectasis.      < end of copied text >   CC: Upper airway evaluation    HPI: 67 y/o M with a PMHx of HTN, T2DM, HLD, and elevated PSA/Bilateral Hydrocele s/p epididymitis treatment presents with right upper chest wall pain and swelling. CT done concerns for right septic SC joint. orthopedic, CTS and ID following. ENT consulted for upper airway evaluation due to imaging finding concerns for asymmetrical AE fold. Patient seen and examined at bedside. Patient reports the sore throat starts on Monday and noticed slight hoarseness. Able to tolerate solid food and able to speaks in full sentence. No sign of respiratory distress. Denies fever, chills, SOB, and abdominal pain.       PAST MEDICAL & SURGICAL HISTORY:  DM (diabetes mellitus)      Hypertension      Hydrocele        Allergies    No Known Allergies    Intolerances      MEDICATIONS  (STANDING):  dextrose 5%. 1000 milliLiter(s) (50 mL/Hr) IV Continuous <Continuous>  dextrose 5%. 1000 milliLiter(s) (100 mL/Hr) IV Continuous <Continuous>  dextrose 50% Injectable 25 Gram(s) IV Push once  dextrose 50% Injectable 12.5 Gram(s) IV Push once  dextrose 50% Injectable 25 Gram(s) IV Push once  enalapril 10 milliGRAM(s) Oral daily  enoxaparin Injectable 40 milliGRAM(s) SubCutaneous every 24 hours  glucagon  Injectable 1 milliGRAM(s) IntraMuscular once  influenza  Vaccine (HIGH DOSE) 0.7 milliLiter(s) IntraMuscular once  insulin lispro (ADMELOG) corrective regimen sliding scale   SubCutaneous three times a day before meals  insulin lispro (ADMELOG) corrective regimen sliding scale   SubCutaneous at bedtime  piperacillin/tazobactam IVPB.. 3.375 Gram(s) IV Intermittent every 8 hours  vancomycin  IVPB 1250 milliGRAM(s) IV Intermittent every 12 hours    MEDICATIONS  (PRN):  dextrose Oral Gel 15 Gram(s) Oral once PRN Blood Glucose LESS THAN 70 milliGRAM(s)/deciliter      Social History: Denies substance use, no ETOH use and no smoking    Family history: No pertinent family history in first degree relatives    ROS:   ENT: all negative except as noted in HPI   CV: denies palpitations  Pulm: denies SOB, cough, hemoptysis  GI: denies change in appetite, indigestion, n/v  : denies pertinent urinary symptoms, urgency  Neuro: denies numbness/tingling, loss of sensation  Psych: denies anxiety  MS: denies muscle weakness, instability  Heme: denies easy bruising or bleeding  Endo: denies heat/cold intolerance, excessive sweating  Vascular: denies LE edema    Vital Signs Last 24 Hrs  T(C): 36.9 (26 Oct 2023 05:42), Max: 37.7 (25 Oct 2023 18:50)  T(F): 98.4 (26 Oct 2023 05:42), Max: 99.9 (25 Oct 2023 18:50)  HR: 97 (26 Oct 2023 05:42) (90 - 101)  BP: 122/88 (26 Oct 2023 05:42) (122/88 - 144/95)  BP(mean): --  RR: 18 (26 Oct 2023 05:42) (16 - 18)  SpO2: 95% (26 Oct 2023 05:42) (95% - 100%)    Parameters below as of 26 Oct 2023 05:42  Patient On (Oxygen Delivery Method): room air                              13.4   19.29 )-----------( 378      ( 25 Oct 2023 19:30 )             40.1    10-25    137  |  98  |  13  ----------------------------<  114<H>  4.1   |  23  |  0.93    Ca    9.6      25 Oct 2023 19:30  Phos  3.5     10-25  Mg     1.80     10-25    TPro  7.8  /  Alb  3.8  /  TBili  0.5  /  DBili  x   /  AST  17  /  ALT  27  /  AlkPhos  51  10-25       PHYSICAL EXAM:  Gen: NAD  Skin: No rashes, bruises, or lesions  Head: Normocephalic, Atraumatic  Face: no edema, erythema, or fluctuance. Parotid glands soft without mass  Eyes: no scleral injection  Ears: No mastoid tenderness, erythema, or ear bulging  Nose: Nares bilaterally patent, no discharge  Mouth: No stridor, no drooling, no trismus.  Mucosa moist, tongue/uvula midline, slight erythematous of the oropharynx, 1+ tonsils bilaterally, congenital elongated uvula  Neck: Flat, supple, no lymphadenopathy, trachea midline, no masses  Lymphatic: No lymphadenopathy  Resp: breathing easily, no stridor  CV: no peripheral edema/cyanosis  GI: nondistended   Peripheral vascular: no JVD or edema  Neuro: facial nerve intact, no facial droop    Fiberoptic Indirect laryngoscopy:  (Scope #2 used)  Flexible laryngoscopy was performed and revealed the following:    -- Nasopharynx had no mass or exudate.    -- Posterior pharyngeal wall clear, no edema, and no erythema    -- Base of tongue was symmetric and not enlarged     -- Vallecula was clear    -- Epiglottis, both aryepiglottic folds and both false vocal folds were normal    -- Arytenoids both without edema and erythema     -- True vocal folds were fully mobile and without lesions.     -- Post cricoid area clear, no edema and no erythema    -- Interarytenoid edema was absent    -- Airway patent      IMAGING/ADDITIONAL STUDIES:   < from: CT Neck Soft Tissue w/ IV Cont (10.25.23 @ 22:26) >  ACC: [id number] EXAM:  CT NECK SOFT TISSUE IC   ORDERED BY: HELEN BLOCH     PROCEDURE DATE:  10/25/2023          INTERPRETATION:  CLINICAL INFORMATION: Neck pain, pharyngitis.    TECHNIQUE: CT of the neck were obtained without the administration of   intravenous contrast.  Coronal and sagittal reformats were performed. 90   cc of Omnipaque 350 was administered. 10 cc discarded.    COMPARISON: CT chest concurrently performed    FINDINGS:  The parotid and submandibular glands are unremarkable. There is a 0.9 cm   hypodense and suspected 1.7 cm hypodense left thyroid nodule.    There is asymmetric thickening of the right aryepiglottic fold with   associated laryngocele (310, 67). Underlying mass is not excluded.    No acute osseous abnormality is identified. Multilevel degenerative   changes are noted in the cervical spine.    There is a small left maxillary sinus retention cyst versus polyp. The   paranasal sinuses are otherwise well aerated. The mastoid air cells are   well aerated.    There is low density material is noted in the lower neck anterior and   between the right thyroid lobe and internal jugular vein (310, 48). This   extends to the soft tissues posterior to the right clavicle. See   separately dictated chest CT for additional findings.    IMPRESSION:    1. Low-density soft tissue in the right lower neck extending into the   chest, suspected to be related to sternoclavicular infection, described   in more detail on separately dictated chest CT of the same day.    2. Asymmetric thickening of the right aryepiglottic fold. Underlying mass   is not excluded. Follow-up with ENT and direct visualization with scope   is recommended.    3. Bilateral thyroid nodules measuring up to 1.7 cm on the left.   Correlation with nonemergent follow-up ultrasound is recommended.    Dr. BERE Paez discussed these findings with Dr. Abisai MD on 10/26/2023   1:57 AM, with verbal confirmation.    --- End of Report ---          ALEX PAEZ MD; Resident Radiologist  This document has been electronically signed.  DANY HAWKINS MD; Attending Radiologist  This document has been electronically signed. Oct 26 2023  1:58AM    < end of copied text >  < from: CT Chest w/ IV Cont (10.25.23 @ 22:26) >  ACC: [id number] EXAM:  CT CHEST IC   ORDERED BY: HELEN BLOCH     PROCEDURE DATE:  10/25/2023          INTERPRETATION:  CLINICAL INFORMATION: Right chest swelling under   clavicle.    COMPARISON: CT neck same day    CONTRAST/COMPLICATIONS:  IV Contrast: IV contrast documented in unlinked concurrent exam  Oral Contrast: NONE  Complications: None reported at time of study completion    PROCEDURE:  CT scan of the chest was obtained with intravenous contrast.    FINDINGS:    LYMPH NODES: No enlarged mediastinal, axillary, or hilar lymph nodes.    HEART/VASCULATURE: The heart is normal in size. No pericardial effusion.   Ascending thoracic aorta measures 4.0 cm at the level of the main   pulmonary artery. Coronary calcifications.    AIRWAYS/LUNGS/PLEURA: The central airways are patent. Elevated right   hemidiaphragm. Small right pleural effusion and mild right lower lobe   atelectasis. No pneumothorax.    UPPER ABDOMEN: Unremarkable.    BONES/SOFT TISSUES: Bilateral hypodense lesions in the thyroid, measuring   up to 0.9 cm in the right lobe. Degenerative changes. There is asymmetric   widening of the right sternoclavicular joint with fluid attenuation in   the joint space (304, 34). Low density soft tissue is noted in the lower   neck between the right internal jugular vein and right thyroid gland   (example 3 4, 22) and extends posterior to the right clavicle. There is   mild cortical irregularity at the right sternoclavicular joint as well as   at the superior aspect of the cartilageof the right first rib (example   603, 47).    IMPRESSION:    1. Asymmetric widening of the right sternoclavicular joint with fluid in   the joint and cortical irregularities involving the sternoclavicular   joint and cartilage of the first rib. Softtissue density material   extending from the right lower neck to the posterior aspect of the right   clavicle. Findings are overall suspicious for septic sternoclavicular   joint and possible underlying osteomyelitis with associated spread of   infection into the lower neck and posterior to the clavicle. MRI is   recommended for further evaluation.    2. Hypodense thyroid nodules for which nonurgent follow-up ultrasound is   recommended.    3. Small right pleural effusion and mild right basilar atelectasis.      < end of copied text >

## 2023-10-26 NOTE — PROGRESS NOTE ADULT - ASSESSMENT
68M w/HTN T2DM, HLD, Elevated PSA/Bilateral Hydrocele s/p epididymitis treatment presents with concern for septic arthritis iso radiographic evidence and severe shoulder pain with possible source being GBS UTI; now started on IV Abx, pending IR evaluation.

## 2023-10-26 NOTE — H&P ADULT - NSHPREVIEWOFSYSTEMS_GEN_ALL_CORE
REVIEW OF SYSTEMS:  CONSTITUTIONAL: No weakness, fevers or chills  EYES/ENT: No visual changes;  No vertigo or throat pain   NECK: No pain or stiffness  RESPIRATORY: No cough, wheezing, hemoptysis; No shortness of breath  CARDIOVASCULAR: No chest pain or palpitations  GASTROINTESTINAL: No abdominal or epigastric pain. No nausea, vomiting, or hematemesis; No diarrhea or constipation. No melena or hematochezia.  GENITOURINARY: No dysuria, frequency or hematuria  NEUROLOGICAL: No numbness or weakness  SKIN: No itching, rashes REVIEW OF SYSTEMS:  CONSTITUTIONAL: Patient appears comfortable in bed  EYES/ENT: No visual changes;  generalized throat soreness for over 1 week  NECK: Right sided neck soreness with movement  RESPIRATORY: Mild chronic cough, but no wheezing/sneezing/rhinorrhea  CARDIOVASCULAR: R persistent chest pain, localized, dull.  GASTROINTESTINAL: No abdominal or epigastric pain. No nausea, vomiting, or hematemesis; No diarrhea or constipation. No melena or hematochezia.  GENITOURINARY: No dysuria, frequency or hematuria  NEUROLOGICAL: No numbness or weakness  SKIN: No itching, rashes

## 2023-10-26 NOTE — CONSULT NOTE ADULT - SUBJECTIVE AND OBJECTIVE BOX
Interventional Radiology    Evaluate for Procedure: Right sternoclavicular septic joint arthrocentesis    HPI: 68M who presents with concern for septic arthritis with severe shoulder pain. Possible source identified as GBS UTI. Patient is now started on IV Antibiotics. IR consulted for possible right sternoclavicular septic joint arthrocentesis.    Allergies: No Known Allergies    Medications (Abx/Cardiac/Anticoagulation/Blood Products)    enalapril: 10 milliGRAM(s) Oral (10-26 @ 12:50)  enoxaparin Injectable: 40 milliGRAM(s) SubCutaneous (10-26 @ 12:48)  piperacillin/tazobactam IVPB..: 25 mL/Hr IV Intermittent (10-26 @ 06:03)  piperacillin/tazobactam IVPB...: 200 mL/Hr IV Intermittent (10-26 @ 00:33)  vancomycin  IVPB.: 250 mL/Hr IV Intermittent (10-26 @ 03:39)    Data:  170.2  85.5  T(C): 36.9  HR: 97  BP: 128/77  RR: 18  SpO2: 98%    -WBC 19.29 / HgB 13.4 / Hct 40.1 / Plt 378  -Na 137 / Cl 98 / BUN 13 / Glucose 114  -K 4.1 / CO2 23 / Cr 0.93  -ALT 27 / Alk Phos 51 / T.Bili 0.5      Radiology:   CT Chest 10/25/23  IMPRESSION:    1. Asymmetric widening of the right sternoclavicular joint with fluid in the joint and cortical irregularities involving the sternoclavicular joint and cartilage of the first rib. Soft tissue density material extending from the right lower neck to the posterior aspect of the right clavicle. Findings are overall suspicious for septic sternoclavicular joint and possible underlying osteomyelitis with associated spread of infection into the lower neck and posterior to the clavicle. MRI is recommended for further evaluation.    2. Hypodense thyroid nodules for which nonurgent follow-up ultrasound is recommended.    3. Small right pleural effusion and mild right basilar atelectasis.    Dr. BERE Paez discussed these findings with Dr. Abisai MD on 10/26/2023 1:57 AM, with verbal confirmation.    Assessment/Plan:   68M who presents with concern for septic arthritis with severe shoulder pain. Possible source identified as GBS UTI. Patient is now started on IV Antibiotics. IR consulted for possible right sternoclavicular septic joint arthrocentesis. CT Chest showed asymmetric widening of the right sternoclavicular joint with fluid with concern for spetic joint and possible underlying osteomyelitis. Recommended MRI for further evaluation Patient has MR ordered. Will follow-up after review of imaging. Interventional Radiology    Evaluate for Procedure: Right sternoclavicular septic joint arthrocentesis    HPI: 68M who presents with concern for septic arthritis with severe shoulder pain. Possible source identified as GBS UTI. Patient is now started on IV Antibiotics. IR consulted for possible right sternoclavicular septic joint arthrocentesis.    Allergies: No Known Allergies    Medications (Abx/Cardiac/Anticoagulation/Blood Products)    enalapril: 10 milliGRAM(s) Oral (10-26 @ 12:50)  enoxaparin Injectable: 40 milliGRAM(s) SubCutaneous (10-26 @ 12:48)  piperacillin/tazobactam IVPB..: 25 mL/Hr IV Intermittent (10-26 @ 06:03)  piperacillin/tazobactam IVPB...: 200 mL/Hr IV Intermittent (10-26 @ 00:33)  vancomycin  IVPB.: 250 mL/Hr IV Intermittent (10-26 @ 03:39)    Data:  170.2  85.5  T(C): 36.9  HR: 97  BP: 128/77  RR: 18  SpO2: 98%    -WBC 19.29 / HgB 13.4 / Hct 40.1 / Plt 378  -Na 137 / Cl 98 / BUN 13 / Glucose 114  -K 4.1 / CO2 23 / Cr 0.93  -ALT 27 / Alk Phos 51 / T.Bili 0.5      Radiology:   CT Chest 10/25/23  IMPRESSION:    1. Asymmetric widening of the right sternoclavicular joint with fluid in the joint and cortical irregularities involving the sternoclavicular joint and cartilage of the first rib. Soft tissue density material extending from the right lower neck to the posterior aspect of the right clavicle. Findings are overall suspicious for septic sternoclavicular joint and possible underlying osteomyelitis with associated spread of infection into the lower neck and posterior to the clavicle. MRI is recommended for further evaluation.    2. Hypodense thyroid nodules for which nonurgent follow-up ultrasound is recommended.    3. Small right pleural effusion and mild right basilar atelectasis.    Dr. BERE Paez discussed these findings with Dr. Abisai MD on 10/26/2023 1:57 AM, with verbal confirmation.    Assessment/Plan:   68M who presents with concern for septic arthritis with severe shoulder pain. Possible source identified as GBS UTI. Patient is now started on IV Antibiotics. IR consulted for possible right sternoclavicular septic joint arthrocentesis. CT Chest showed asymmetric widening of the right sternoclavicular joint with fluid with concern for spetic joint and possible underlying osteomyelitis. Recommended MRI for further evaluation. Patient has MR ordered. Spoke with team (Ilir Bourgeois) and patient will be going to OR with thoracic surgery tomorrow and they may provided necessary intervention. Team is okay with re-consulting if needed.

## 2023-10-26 NOTE — H&P ADULT - NSHPLABSRESULTS_GEN_ALL_CORE
13.4   19.29 )-----------( 378      ( 25 Oct 2023 19:30 )             40.1       10-25    137  |  98  |  13  ----------------------------<  114<H>  4.1   |  23  |  0.93    Ca    9.6      25 Oct 2023 19:30  Phos  3.5     10-25  Mg     1.80     10-25    TPro  7.8  /  Alb  3.8  /  TBili  0.5  /  DBili  x   /  AST  17  /  ALT  27  /  AlkPhos  51  10-25              Urinalysis Basic - ( 25 Oct 2023 19:30 )    Color: x / Appearance: x / SG: x / pH: x  Gluc: 114 mg/dL / Ketone: x  / Bili: x / Urobili: x   Blood: x / Protein: x / Nitrite: x   Leuk Esterase: x / RBC: x / WBC x   Sq Epi: x / Non Sq Epi: x / Bacteria: x            Lactate Trend            CAPILLARY BLOOD GLUCOSE      POCT Blood Glucose.: 89 mg/dL (25 Oct 2023 18:19)

## 2023-10-26 NOTE — CONSULT NOTE ADULT - ATTENDING COMMENTS
68 m with DM, HTN, HLD, Elevated PSA/Hydrocele, p/w R chest pain, neck, throat pain, edema, hoarseness and hematuria  u/a outpatient showed hematuria and urine cx: 10-49K GBS  throat cx: strep anginosis  afebrile, WBC: 19, ESR: 78  CRP: 198   CT: Asymmetric widening of the R sternoclavicular joint with fluid in the joint and cortical irregularities involving the sternoclavicular joint and cartilage of the first rib. Soft tissue density material extending from the right lower neck to the posterior aspect of the right clavicle, s/o septic sternoclavicular joint and possible underlying osteomyelitis with associated spread of infection into the lower neck and posterior to the clavicle.   neck CT: Low-density soft tissue in the right lower neck extending into the chest, suspected to be related to sternoclavicular infection, Asymmetric thickening of the right aryepiglottic fold. Underlying mass is not excluded.     strep anginosis of throat and neck extending to R sternoclavicular joint with septic arthritis and possible osteo  hematuria, unclear etiology, now u/a unremarkable    * discontinue vanco, zosyn and start unasyn 3 q 6  * f/u the blood and urine cx  * f/u with thoracic surgery for washout  * please send OR cx   * monitor CBc/diff and CMP    The above assessment and plan was discussed with the primary team    Bernadette Delarosa MD  contact on teams  After 5pm and on weekends call [phone]

## 2023-10-26 NOTE — H&P ADULT - PROBLEM SELECTOR PLAN 1
CT Chest showing concern for osteomyelitis vs SEptic joint of R Sternoclavicular joint CT Chest showing concern for osteomyelitis vs SEptic joint of R Sternoclavicular joint  Patient AFebrile, with only mild subclinical fever, although 50-60% of septic arthritis can present afebrile  Patient with improved ROM with pain medication, which is a bit atypical for a septic joint    Concern for radiologic evidence of Septic Arthritis with possible osteo  Elevated ESR and CRP  Patient with U/A positive for GBS; can consider as source of infection    Plan:  Vanc/Zosyn 10/25 -   BCx 10/23 - NGTD (outpatient in [facility])  BCx 10/25 -   U/A;  Appreciate Ortho Recs; may need Thoracic surgery input  IR Consult Placed  MR Shoulder Joint for further evaluation of potential osteo CT Chest showing concern for osteomyelitis vs SEptic joint of R Sternoclavicular joint  Patient AFebrile, with only mild subclinical fever, although 50-60% of septic arthritis can present afebrile  Patient with improved ROM with pain medication, which is a bit atypical for a septic joint    Concern for radiologic evidence of Septic Arthritis with possible osteo  Elevated ESR and CRP  Patient with U/A positive for GBS; can consider as source of infection  Discussed with Orthopedic surgery; will need CT Surgery for management  Plan:  Vanc/Zosyn 10/25 -   BCx 10/23 - NGTD (outpatient in [facility])  BCx 10/25 -   F/U U/A;  F/U Thoracic Surgery Recs  IR Consult Placed  MR Shoulder Joint for further evaluation of potential osteo

## 2023-10-26 NOTE — CONSULT NOTE ADULT - ASSESSMENT
SYNTHESIS      RECOMMENDATIONS  -  SYNTHESIS  Mr. Valverde is a 68M with h/o T2DM (A1c 8.4%, NIDDM), HTN, HLD, Elevated PSA/Hydrocele who presents with progressive right neck/shoulder/SCJ pain and found to have evidence for locally invasive infection/inflammation. This may have originated as a strep tonsillitis or may have spread hematogenously (recent proceduralization 07/2023), but blood cultures negative, making former more likely. He did have hematuria, which may or may not be related. He has significant TTP of SCJ, R neck, R axilla with warmth & edema. The strep. anginosus from throat culture seems to be a possible culprit for this infection and would benefit for now from broad coverage for polymicrobial organisms, which can be afforded by Amp/Sulbactam; no current e/o invasive pseudomonal infection that would require pip/tazo (though pt notably with DM - no wounds or cultures noted).     RECOMMENDATIONS  - Agree with procedural service involvement for source control  - Recommend obtain outpatient CT A/P in either PACS or at minimum the report  - Follow-Up BCx, UCx, Throat Cx (HIE 10/23)  - Follow-Up BCx, UCx (Inpatient 10/25)  - DIScontinue Piperacillin/Tazobactam ("Zosyn")  - DIScontinue Vancomycin  - START Ampicillin/Sulbactam ("Unasyn") 3g IV Q6H (dose-adjust if CrCl changes)    Thony Tanner MD PGY-3  Case D/W Dr. Delarosa

## 2023-10-26 NOTE — CONSULT NOTE ADULT - SUBJECTIVE AND OBJECTIVE BOX
HPI:  68M w/HTN T2DM, HLD, Elevated PSA/Bilateral Hydrocele s/p epididymitis treatment presents with R sided chest swelling/neck pain since monday,      Patient reports that the R sided pain and swelling seemed to start on Monday and he went to his PCP, Dr. Narvaez for R sided neck discomfort and shoulder pain, with blood cultures drawn on 10/23 showing NGTD, ESR elevated to 63, and Urine culture positive for GBS. He reports never having this kind of pain before except in his left shoulder which was being treated for bursitis, He says there was pain that shot from his L shoulder to his Right and ever since then he was in pain. He reported it was mild and manageable at first, but was refractory to meloxicam and tylenol. On Wednesday, he had a scheduled CT of his pelvis and reports soon before his CT scan, his pain intensified severely to 10/10 and right after the scan, he went to [facility]. He reports pain is constant, and 14/10; worsens with movement of his shoulder and his neck. Also endorses some burning across his shoulder. He endorses some burning with urination for the past 3 days as well. He has never had anything severe like this before. Denies any recent trauma, any recent URI symptoms, any history of shoulder surgeries.     ED Course: 97.9, 141/88, 101, 18/min 100% RA  Patient was started on Vanc/Zosyn, Morphine 4mg IV x1,   CT A/P Showed signs concerning for potential sternoclavicular joint septic arthritis  Hypodense thyroid nodules as well.    (26 Oct 2023 04:09)      PAST MEDICAL & SURGICAL HISTORY:  DM (diabetes mellitus)      Hypertension      Hydrocele          REVIEW OF SYSTEMS      General:No Weight change/ Fatigue/ HA/Dizzy	    Skin/Breast: No Rashes/ Lesions/ Masses  	  Ophthalmologic: No Blurry vision/ Glaucoma/ Blindness  	  ENMT: No Hearing loss/ Drainage/ Lesions	    Respiratory and Thorax: No Cough/ Wheezing/ SOB/ Hemoptysis/ Sputum production  see HPI  	  Cardiovascular: No Chest pain/ Palpitations/ Diaphoresis	    Gastrointestinal: No Nausea/ Vomiting/ Constipation/ Appetite Change	    Genitourinary: see HPI    Musculoskeletal: No Pain/ Weakness/ Claudication	    Neurological: No Seizures/ TIA/CVA/ Parastesias	    Psychiatric: No Dementia/ Depression/ SI/HI	    Hematology/Lymphatics: No hx of bleeding/ Edema	    Endocrine:	No Hyperglycemia/ Hypoglycemia    Allergic/Immunologic:	 No Anaphylaxis/ Intolerance/ Recent illnesses    MEDICATIONS  (STANDING):  dextrose 5%. 1000 milliLiter(s) (50 mL/Hr) IV Continuous <Continuous>  dextrose 5%. 1000 milliLiter(s) (100 mL/Hr) IV Continuous <Continuous>  dextrose 50% Injectable 25 Gram(s) IV Push once  dextrose 50% Injectable 12.5 Gram(s) IV Push once  dextrose 50% Injectable 25 Gram(s) IV Push once  glucagon  Injectable 1 milliGRAM(s) IntraMuscular once  influenza  Vaccine (HIGH DOSE) 0.7 milliLiter(s) IntraMuscular once  insulin lispro (ADMELOG) corrective regimen sliding scale   SubCutaneous three times a day before meals  insulin lispro (ADMELOG) corrective regimen sliding scale   SubCutaneous at bedtime  piperacillin/tazobactam IVPB.. 3.375 Gram(s) IV Intermittent every 8 hours  vancomycin  IVPB 1250 milliGRAM(s) IV Intermittent every 12 hours    MEDICATIONS  (PRN):  dextrose Oral Gel 15 Gram(s) Oral once PRN Blood Glucose LESS THAN 70 milliGRAM(s)/deciliter      Allergies    No Known Allergies    Intolerances        SOCIAL HISTORY:  Occupation:  Smoking Hx: denies  Etoh Hx: denies  IVDA Hx: denies    FAMILY HISTORY:    unless noted, no significant family hx with Mother, Father, Siblings    Vital Signs Last 24 Hrs  T(C): 36.9 (26 Oct 2023 05:42), Max: 37.7 (25 Oct 2023 18:50)  T(F): 98.4 (26 Oct 2023 05:42), Max: 99.9 (25 Oct 2023 18:50)  HR: 97 (26 Oct 2023 05:42) (90 - 101)  BP: 122/88 (26 Oct 2023 05:42) (122/88 - 144/95)  BP(mean): --  RR: 18 (26 Oct 2023 05:42) (16 - 18)  SpO2: 95% (26 Oct 2023 05:42) (95% - 100%)    Parameters below as of 26 Oct 2023 05:42  Patient On (Oxygen Delivery Method): room air        General: WN/WD NAD  Neurology: Awake, nonfocal, MCMAHAN x 4  Eyes: Scleras clear, PERRLA/ EOMI, Gross vision intact  ENT:Gross hearing intact, grossly patent pharynx, no stridor  Neck: Neck supple, trachea midline, No JVD,   Respiratory: CTA B/L, No wheezing, rales, rhonchi  CV: RRR, S1S2, no murmurs, rubs or gallops  Abdominal: Soft, NT, ND +BS,   Extremities: No edema, + peripheral pulses  Skin: No Rashes, Hematoma, Ecchymosis  Lymphatic: No Neck, axilla, groin LAD  Psych: Oriented x 3, normal affect  Incisions:   Tubes:    LABS:                        13.4   19.29 )-----------( 378      ( 25 Oct 2023 19:30 )             40.1     10-25    137  |  98  |  13  ----------------------------<  114<H>  4.1   |  23  |  0.93    Ca    9.6      25 Oct 2023 19:30  Phos  3.5     10-25  Mg     1.80     10-25    TPro  7.8  /  Alb  3.8  /  TBili  0.5  /  DBili  x   /  AST  17  /  ALT  27  /  AlkPhos  51  10-25      Urinalysis Basic - ( 25 Oct 2023 19:30 )    Color: x / Appearance: x / SG: x / pH: x  Gluc: 114 mg/dL / Ketone: x  / Bili: x / Urobili: x   Blood: x / Protein: x / Nitrite: x   Leuk Esterase: x / RBC: x / WBC x   Sq Epi: x / Non Sq Epi: x / Bacteria: x        RADIOLOGY & ADDITIONAL STUDIES:    ASSESSMENT:   68yMalePAST MEDICAL & SURGICAL HISTORY:  DM (diabetes mellitus)      Hypertension      Hydrocele      HEALTH ISSUES - PROBLEM Dx:  Diabetes    HTN (hypertension)    Need for prophylactic measure    Septic joint    Acute UTI    Epiglottic lesion        HEALTH ISSUES - R/O PROBLEM Dx:      PLAN: HPI:  68M w/HTN T2DM, HLD, Elevated PSA/Bilateral Hydrocele s/p epididymitis treatment presents with R sided chest swelling/neck pain since monday,      Patient reports that the R sided pain and swelling seemed to start on Monday and he went to his PCP, Dr. Narvaez for R sided neck discomfort and shoulder pain, with blood cultures drawn on 10/23 showing NGTD, ESR elevated to 63, and Urine culture positive for GBS. He reports never having this kind of pain before except in his left shoulder which was being treated for bursitis, He says there was pain that shot from his L shoulder to his Right and ever since then he was in pain. He reported it was mild and manageable at first, but was refractory to meloxicam and tylenol. On Wednesday, he had a scheduled CT of his pelvis and reports soon before his CT scan, his pain intensified severely to 10/10 and right after the scan, he went to [facility]. He reports pain is constant, and 14/10; worsens with movement of his shoulder and his neck. Also endorses some burning across his shoulder. He endorses some burning with urination for the past 3 days as well. He has never had anything severe like this before. Denies any recent trauma, any recent URI symptoms, any history of shoulder surgeries.     ED Course: 97.9, 141/88, 101, 18/min 100% RA  Patient was started on Vanc/Zosyn, Morphine 4mg IV x1,   CT A/P Showed signs concerning for potential sternoclavicular joint septic arthritis  Hypodense thyroid nodules as well.    (26 Oct 2023 04:09)      PAST MEDICAL & SURGICAL HISTORY:  DM (diabetes mellitus)      Hypertension      Hydrocele          REVIEW OF SYSTEMS      General:No Weight change/ Fatigue/ HA/Dizzy	    Skin/Breast: No Rashes/ Lesions/ Masses  	  Ophthalmologic: No Blurry vision/ Glaucoma/ Blindness  	  ENMT: No Hearing loss/ Drainage/ Lesions	    Respiratory and Thorax: No Cough/ Wheezing/ SOB/ Hemoptysis/ Sputum production  see HPI  	  Cardiovascular: No Chest pain/ Palpitations/ Diaphoresis	    Gastrointestinal: No Nausea/ Vomiting/ Constipation/ Appetite Change	    Genitourinary: see HPI    Musculoskeletal: No Pain/ Weakness/ Claudication	    Neurological: No Seizures/ TIA/CVA/ Parastesias	    Psychiatric: No Dementia/ Depression/ SI/HI	    Hematology/Lymphatics: No hx of bleeding/ Edema	    Endocrine:	No Hyperglycemia/ Hypoglycemia    Allergic/Immunologic:	 No Anaphylaxis/ Intolerance/ Recent illnesses    MEDICATIONS  (STANDING):  dextrose 5%. 1000 milliLiter(s) (50 mL/Hr) IV Continuous <Continuous>  dextrose 5%. 1000 milliLiter(s) (100 mL/Hr) IV Continuous <Continuous>  dextrose 50% Injectable 25 Gram(s) IV Push once  dextrose 50% Injectable 12.5 Gram(s) IV Push once  dextrose 50% Injectable 25 Gram(s) IV Push once  glucagon  Injectable 1 milliGRAM(s) IntraMuscular once  influenza  Vaccine (HIGH DOSE) 0.7 milliLiter(s) IntraMuscular once  insulin lispro (ADMELOG) corrective regimen sliding scale   SubCutaneous three times a day before meals  insulin lispro (ADMELOG) corrective regimen sliding scale   SubCutaneous at bedtime  piperacillin/tazobactam IVPB.. 3.375 Gram(s) IV Intermittent every 8 hours  vancomycin  IVPB 1250 milliGRAM(s) IV Intermittent every 12 hours    MEDICATIONS  (PRN):  dextrose Oral Gel 15 Gram(s) Oral once PRN Blood Glucose LESS THAN 70 milliGRAM(s)/deciliter      Allergies    No Known Allergies    Intolerances        SOCIAL HISTORY:  · Substance use	No     Tobacco Screening:  · Core Measure Site	No  · Has the patient used tobacco in the past 30 days?	No      FAMILY HISTORY:    unless noted, no significant family hx with Mother, Father, Siblings    Vital Signs Last 24 Hrs  T(C): 36.9 (26 Oct 2023 05:42), Max: 37.7 (25 Oct 2023 18:50)  T(F): 98.4 (26 Oct 2023 05:42), Max: 99.9 (25 Oct 2023 18:50)  HR: 97 (26 Oct 2023 05:42) (90 - 101)  BP: 122/88 (26 Oct 2023 05:42) (122/88 - 144/95)  BP(mean): --  RR: 18 (26 Oct 2023 05:42) (16 - 18)  SpO2: 95% (26 Oct 2023 05:42) (95% - 100%)    Parameters below as of 26 Oct 2023 05:42  Patient On (Oxygen Delivery Method): room air      PE  General: WN/WD NAD  Neurology: Awake, nonfocal, MCMAHAN x 4  Eyes: Scleras clear, PERRLA/ EOMI, Gross vision intact  ENT:Gross hearing intact, grossly patent pharynx, no stridor  Neck: Neck supple, trachea midline, No JVD,   Respiratory: CTA B/L, No wheezing, rales, rhonchi]  MSK: Patient with painful ROM of Right shoulder, mild tenderness elicited on exam,   Mild swelling noted, no obvious erythema  CV: RRR, S1S2, no murmurs, rubs or gallops  Abdominal: Soft, NT, ND +BS,   Extremities: No edema, + peripheral pulses  Skin: No Rashes, Hematoma, Ecchymosis  Lymphatic: No Neck, axilla, groin LAD  Psych: Oriented x 3, normal affect      LABS:                        13.4   19.29 )-----------( 378      ( 25 Oct 2023 19:30 )             40.1     10-25    137  |  98  |  13  ----------------------------<  114<H>  4.1   |  23  |  0.93    Ca    9.6      25 Oct 2023 19:30  Phos  3.5     10-25  Mg     1.80     10-25    TPro  7.8  /  Alb  3.8  /  TBili  0.5  /  DBili  x   /  AST  17  /  ALT  27  /  AlkPhos  51  10-25      Urinalysis Basic - ( 25 Oct 2023 19:30 )    Color: x / Appearance: x / SG: x / pH: x  Gluc: 114 mg/dL / Ketone: x  / Bili: x / Urobili: x   Blood: x / Protein: x / Nitrite: x   Leuk Esterase: x / RBC: x / WBC x   Sq Epi: x / Non Sq Epi: x / Bacteria: x        RADIOLOGY & ADDITIONAL STUDIES:  < from: CT Chest w/ IV Cont (10.25.23 @ 22:26) >    ACC: [id number] EXAM:  CT CHEST IC   ORDERED BY: HELEN BLOCH     PROCEDURE DATE:  10/25/2023          INTERPRETATION:  CLINICAL INFORMATION: Right chest swelling under   clavicle.    COMPARISON: CT neck same day    CONTRAST/COMPLICATIONS:  IV Contrast: IV contrast documented in unlinked concurrent exam  Oral Contrast: NONE  Complications: None reported at time of study completion    PROCEDURE:  CT scan of the chest was obtained with intravenous contrast.    FINDINGS:    LYMPH NODES: No enlarged mediastinal, axillary, or hilar lymph nodes.    HEART/VASCULATURE: The heart is normal in size. No pericardial effusion.   Ascending thoracic aorta measures 4.0 cm at the level of the main   pulmonary artery. Coronary calcifications.    AIRWAYS/LUNGS/PLEURA: The central airways are patent. Elevated right   hemidiaphragm. Small right pleural effusion and mild right lower lobe   atelectasis. No pneumothorax.    UPPER ABDOMEN: Unremarkable.    BONES/SOFT TISSUES: Bilateral hypodense lesions in the thyroid, measuring   up to 0.9 cm in the right lobe. Degenerative changes. There is asymmetric   widening of the right sternoclavicular joint with fluid attenuation in   the joint space (304, 34). Low density soft tissue is noted in the lower   neck between the right internal jugular vein and right thyroid gland   (example 3 4, 22) and extends posterior to the right clavicle. There is   mild cortical irregularity at the right sternoclavicular joint as well as   at the superior aspect of the cartilageof the right first rib (example   603, 47).    IMPRESSION:    1. Asymmetric widening of the right sternoclavicular joint with fluid in   the joint and cortical irregularities involving the sternoclavicular   joint and cartilage of the first rib. Softtissue density material   extending from the right lower neck to the posterior aspect of the right   clavicle. Findings are overall suspicious for septic sternoclavicular   joint and possible underlying osteomyelitis with associated spread of   infection into the lower neck and posterior to the clavicle. MRI is   recommended for further evaluation.    2. Hypodense thyroid nodules for which nonurgent follow-up ultrasound is   recommended.    3. Small right pleural effusion and mild right basilar atelectasis.    Dr. BERE Paez discussed these findings with Dr. Abisai MD on 10/26/2023   1:57 AM, with verbal confirmation.    --- End of Report ---          ALEX PAEZ MD; Resident Radiologist  This document has been electronically signed.  DANY HAWKINS MD; Attending Radiologist  This document has been electronically signed. Oct 26 2023  1:59AM    < end of copied text >      ASSESSMENT:   68yMalePAST MEDICAL & SURGICAL HISTORY:    Diabetes    HTN (hypertension)    Need for prophylactic measure    Septic joint    Acute UTI    Epiglottic lesion        HEALTH ISSUES - R/O PROBLEM Dx: SCJ infection      PLAN:  will d/w Dr Loomis and follow w recommendations  continue care as per medical team    Bridger THOMPSON [id number]

## 2023-10-26 NOTE — PROGRESS NOTE ADULT - PROBLEM SELECTOR PLAN 3
CT Neck/Soft tissue noted Asymmetric thickening of the R aryepiglottic fold; possible mass  Denies any respiratory distress  Airway appears patent; no obvious stridorous breathing  o2 sats stable;     Plan:  Consult ENT for direct visualization for further evaluation

## 2023-10-26 NOTE — CONSULT NOTE ADULT - PROBLEM/RECOMMENDATION-2
Attempted to contact patient to notify him of positive gonorrhea test result. No answer, left HIPAA compliant voicemail message. Patient was treated appropriately in the ED. He simply needs notification.
DISPLAY PLAN FREE TEXT

## 2023-10-26 NOTE — H&P ADULT - PROBLEM SELECTOR PLAN 5
Diet: Regular Diet  Psych/Sleep: None JEOVANNY  GI: None JEOVANNY  DVT ppx: Heparin SQH vs SCD for procedure  Code Status: Full Code  Dispo: Home: Enalapril 10 qd    Plan:  C/W Enalapril 10 qd

## 2023-10-26 NOTE — H&P ADULT - PROBLEM SELECTOR PLAN 2
Patient denies any recent UTI symptoms; but has history of UTI infection; and epididymitis    Outpatient U/A and UCx Positive for GBS    Plan:   As above in Septic Joint  Will repeat U/A UCx Patient endorses 3 day history of dysuria; with hx of epididymitis    Outpatient U/A and UCx Positive for GBS    Plan:   As above in Septic Joint  Will repeat U/A UCx

## 2023-10-26 NOTE — PROGRESS NOTE ADULT - PROBLEM SELECTOR PLAN 1
CT Chest showing concern for osteomyelitis vs Septic joint of R Sternoclavicular joint. Patient Afebrile, with only mild subclinical fever, although 50-60% of septic arthritis can present afebrile. Patient with improved ROM with pain medication, which is a bit atypical for a septic joint. Concern for radiologic evidence of Septic Arthritis with possible osteo    -Elevated ESR and CRP  -Patient with U/A positive for GBS; can consider as source of infection  -Discussed with Orthopedic surgery; will need CT Surgery for management    Plan:  Vanc/Zosyn 10/25 -   BCx 10/23 - NGTD (outpatient in allscripts)  BCx 10/25 -   F/U U/A;  F/U Thoracic Surgery Recs  IR Consult Placed  MR Shoulder Joint for further evaluation of potential osteo

## 2023-10-26 NOTE — PROGRESS NOTE ADULT - PROBLEM SELECTOR PLAN 2
Patient endorses 3 day history of dysuria; with hx of epididymitis    Outpatient U/A and UCx Positive for GBS    Plan:   As above in Septic Joint  Will repeat U/A UCx

## 2023-10-26 NOTE — H&P ADULT - NSICDXPASTMEDICALHX_GEN_ALL_CORE_FT
PAST MEDICAL HISTORY:  DM (diabetes mellitus)     Hypertension      PAST MEDICAL HISTORY:  DM (diabetes mellitus)     Hydrocele     Hypertension

## 2023-10-26 NOTE — PATIENT PROFILE ADULT - FALL HARM RISK - HARM RISK INTERVENTIONS

## 2023-10-26 NOTE — CONSULT NOTE ADULT - PROBLEM SELECTOR RECOMMENDATION 9
- CT and scope exam reviewed with Dr. Glez. No concern for mass based on laryngoscopy exam today  - Please obtain thyroid ultrasound and TFT, this can be done inpatient or outpatient, should not hold off on discharge  - No further ENT intervention needed during this admission  - Please have patient follow up with Dr. Glez outpatient in 2 weeks after discharge  - Please call [phone] for an appointment time  - Case discussed with Dr. Glez

## 2023-10-26 NOTE — CONSULT NOTE ADULT - SUBJECTIVE AND OBJECTIVE BOX
HPI  68yMale w/ DM and HTN c/o R upper chest wall pain and swelling for 3-4 days. Denies fevers/chills. Denies numbness/tingling in the RUE. Denies any recent trauma/injuries/sugery/injections. Denies hx of crystal arthopathy or other inflammatory joint disorders, IV drug use, or other infections. Ortho consulted as CT findings concerning for R SC joint effusion with overlying fluid collection.  ESR 78. , wbc 19.      ROS  Negative unless otherwise specified in HPI.    PAST MEDICAL & SURGICAL Hx  PAST MEDICAL & SURGICAL HISTORY:  DM (diabetes mellitus)      Hypertension      Hydrocele          MEDICATIONS  Home Medications:  atorvastatin 40 mg oral tablet: 1 tab(s) orally once a day (26 Oct 2023 06:15)  enalapril 10 mg oral tablet: 1 tab(s) orally once a day (26 Oct 2023 06:15)  glipiZIDE 5 mg oral tablet: 1 tab(s) orally once a day (26 Oct 2023 06:15)  Jardiance 25 mg oral tablet: 1 tab(s) orally once a day (26 Oct 2023 06:15)  metFORMIN 1000 mg oral tablet, extended release: 1 tab(s) orally 2 times a day (26 Oct 2023 06:15)  omeprazole 20 mg oral delayed release tablet: 1 tab(s) orally once a day before breakfast (26 Oct 2023 06:15)      ALLERGIES  No Known Allergies      FAMILY Hx  FAMILY HISTORY:      SOCIAL Hx  Social History:      VITALS  Vital Signs Last 24 Hrs  T(C): 36.9 (26 Oct 2023 05:42), Max: 37.7 (25 Oct 2023 18:50)  T(F): 98.4 (26 Oct 2023 05:42), Max: 99.9 (25 Oct 2023 18:50)  HR: 97 (26 Oct 2023 05:42) (90 - 101)  BP: 122/88 (26 Oct 2023 05:42) (122/88 - 144/95)  BP(mean): --  RR: 18 (26 Oct 2023 05:42) (16 - 18)  SpO2: 95% (26 Oct 2023 05:42) (95% - 100%)    Parameters below as of 26 Oct 2023 05:42  Patient On (Oxygen Delivery Method): room air        PHYSICAL EXAM  Gen: Lying in bed, non-toxic appearing, NAD  Resp: No increased WOB  R Chest Wall:  Skin intact, +boggy/fluctuant fluid collection but no erythema over R SC joint  NTTP over R SC joint/fluid collection, not warmer to touch relative to L side;   RUE:  Skin intact  No TTP, compartments soft  Full R shoulder passive ROM with minimal pain  Motor: Ax/Musc/Med/Rad/AIN/PIN/U intact  Sensory: Ax/Musc/Med/Rad/U SILT  +Rad pulse, WWP    LABS                        13.4   19.29 )-----------( 378      ( 25 Oct 2023 19:30 )             40.1     10-25    137  |  98  |  13  ----------------------------<  114<H>  4.1   |  23  |  0.93    Ca    9.6      25 Oct 2023 19:30  Phos  3.5     10-25  Mg     1.80     10-25    TPro  7.8  /  Alb  3.8  /  TBili  0.5  /  DBili  x   /  AST  17  /  ALT  27  /  AlkPhos  51  10-25      ESR: 78  CRP: --  10-26 @ 03:29        IMAGING  CT chest:  1. Asymmetric widening of the right sternoclavicular joint with fluid in   the joint and cortical irregularities involving the sternoclavicular   joint and cartilage of the first rib. Softtissue density material   extending from the right lower neck to the posterior aspect of the right   clavicle. Findings are overall suspicious for septic sternoclavicular   joint and possible underlying osteomyelitis with associated spread of   infection into the lower neck and posterior to the clavicle. MRI is   recommended for further evaluation.    2. Hypodense thyroid nodules for which nonurgent follow-up ultrasound is   recommended.    3. Small right pleural effusion and mild right basilar atelectasis.        ASSESSMENT & PLAN  68yMale w/ R chest wall pain with fluid collection above SC joint. Pain and ROM improved after pain medication administration.   - WBAT RUE  - Recommend MRI to r/o chest wall phlegmon or retrosternal abscess, assess for osteomyelitis   - No acute ortho surgery at this time  - Pain control  - Ice/cold compress

## 2023-10-26 NOTE — CONSULT NOTE ADULT - NS ATTEND AMEND GEN_ALL_CORE FT
patient with swelling on R SCJ and scan demonstrating fluid around the clavicular head. discussed plan for surgery for incision/debridement and possible resection of clavicle/manubrium and possibly first rib.   discussed risks of surgery including pain, bleeding, worsening or recurrent infection, scarring, decreased mobility of UE , pneumothorax.   all questions answered - patient expressed understanding and agreeable to proceed.

## 2023-10-26 NOTE — CHART NOTE - NSCHARTNOTEFT_GEN_A_CORE
Preoperative Optimization    - Pt does history of COPD with smoking history - quit one year ago. Currently O2 sats stable on RA.  - No history of anesthesia reaction. No known drug allergies.  - No history of easy bleeding.  -Cardiac Risk Index is Class I Risk: 3.0% 30 day risk of death, MI or cardiac arrest    Pt is medically optimized to proceed to OR

## 2023-10-26 NOTE — PROGRESS NOTE ADULT - ATTENDING COMMENTS
68M with hx of T2DM, HTN, elevated PSA/bilateral hydrocele, epididymitis who presents with right neck pain, with c/f septic sternoclavicular   joint/OM. Has been afebrile, but with persistent right neck pain.    CT neck/chest: Asymmetric widening of the right sternoclavicular joint with fluid in the joint and cortical irregularities involving the sternoclavicular   joint and cartilage of the first rib. Soft tissue density material extending from the right lower neck to the posterior aspect of the right clavicle. Findings are overall suspicious for septic sternoclavicular joint and possible underlying osteomyelitis with associated spread of infection into the lower neck and posterior to the clavicle    #Septic sternoclavicular joint: CT neck findings as above. Outpatient BCx 10/23 NTD, UCx 10/23 with Strep agalactiae ?possible source, strep test negative. Repeat cultures pending, MRI pending. Ortho rec MRI/IR eval. Awaiting IR/Thoracic/ID recommendations.     #Thyroid nodule/?epiglottic lesion: CT neck showing thyroid nodule and asymmetric thickening of the right aryepiglottic fold. Appreciate ENT - no concern for mass based on laryngoscopy. Check TFTs. Outpatient thyroid ultrasound.     #DISPO: Medically active. 68M with hx of T2DM, HTN, elevated PSA/bilateral hydrocele, epididymitis who presents with right neck pain, with c/f septic sternoclavicular   joint/OM. Has been afebrile, but with persistent right neck pain.    CT neck/chest: Asymmetric widening of the right sternoclavicular joint with fluid in the joint and cortical irregularities involving the sternoclavicular joint and cartilage of the first rib. Soft tissue density material extending from the right lower neck to the posterior aspect of the right clavicle. Findings are overall suspicious for septic sternoclavicular joint and possible underlying osteomyelitis with associated spread of infection into the lower neck and posterior to the clavicle    #Septic sternoclavicular joint: CT neck findings as above. Outpatient BCx 10/23 NTD, UCx 10/23 with Strep agalactiae ?possible source, strep test negative. Repeat cultures pending, MRI pending. Ortho rec MRI/IR eval. Awaiting IR/Thoracic/ID recommendations.     #Thyroid nodule/?epiglottic lesion: CT neck showing thyroid nodule and asymmetric thickening of the right aryepiglottic fold. Appreciate ENT - no concern for mass based on laryngoscopy. Check TFTs. Outpatient thyroid ultrasound.     #DISPO: Medically active. 68M with hx of T2DM, HTN, elevated PSA/bilateral hydrocele, epididymitis who presents with right neck pain, with c/f septic sternoclavicular joint/OM.     CT neck/chest: Asymmetric widening of the right sternoclavicular joint with fluid in the joint and cortical irregularities involving the sternoclavicular joint and cartilage of the first rib. Soft tissue density material extending from the right lower neck to the posterior aspect of the right clavicle. Findings are overall suspicious for septic sternoclavicular joint and possible underlying osteomyelitis with associated spread of infection into the lower neck and posterior to the clavicle    #Septic sternoclavicular joint: CT neck findings as above. Outpatient BCx 10/23 NTD, UCx 10/23 with Strep agalactiae ?possible source, strep test negative. Repeat cultures pending, MRI pending. Ortho rec MRI/IR eval. Awaiting IR/Thoracic/ID recommendations.     #Thyroid nodule/?epiglottic lesion: CT neck showing thyroid nodule and asymmetric thickening of the right aryepiglottic fold. Appreciate ENT - no concern for mass based on laryngoscopy. Check TFTs. Outpatient thyroid ultrasound.     #DISPO: Medically active. 68M with hx of T2DM, HTN, elevated PSA/bilateral hydrocele, epididymitis who presents with right neck pain, with c/f septic sternoclavicular joint/OM.     CT neck/chest: Asymmetric widening of the right sternoclavicular joint with fluid in the joint and cortical irregularities involving the sternoclavicular joint and cartilage of the first rib. Soft tissue density material extending from the right lower neck to the posterior aspect of the right clavicle. Findings are overall suspicious for septic sternoclavicular joint and possible underlying osteomyelitis with associated spread of infection into the lower neck and posterior to the clavicle    #Septic sternoclavicular joint: CT neck findings as above. Outpatient BCx 10/23 NTD, UCx 10/23 with Strep agalactiae ?possible source, strep test negative. Repeat cultures pending, MRI pending. Ortho rec MRI/IR eval. Awaiting IR/Thoracic/ID recommendations. Empiric abx (vanc/zosyn) for now.     #Thyroid nodule/?epiglottic lesion: CT neck showing thyroid nodule and asymmetric thickening of the right aryepiglottic fold. Appreciate ENT - no concern for mass based on laryngoscopy. Check TFTs. Outpatient thyroid ultrasound.     #DISPO: Medically active. 68M with hx of T2DM, HTN, elevated PSA/bilateral hydrocele, epididymitis who presents with right neck pain, with c/f septic sternoclavicular joint/OM.     CT neck/chest: Asymmetric widening of the right sternoclavicular joint with fluid in the joint and cortical irregularities involving the sternoclavicular joint and cartilage of the first rib. Soft tissue density material extending from the right lower neck to the posterior aspect of the right clavicle. Findings are overall suspicious for septic sternoclavicular joint and possible underlying osteomyelitis with associated spread of infection into the lower neck and posterior to the clavicle    #Septic sternoclavicular joint: CT neck findings as above. Outpatient BCx 10/23 NTD, UCx 10/23 with Strep agalactiae ?possible source, strep test negative. Repeat cultures pending, MRI pending. Ortho rec MRI/IR eval. Awaiting IR/Thoracic/ID recommendations. Empiric abx (vanc/zosyn) for now.    #UTI: Outpatient urine cultures with Strep agalactiae. Empiric abx for above will cover.     #Thyroid nodule/?epiglottic lesion: CT neck showing thyroid nodule and asymmetric thickening of the right aryepiglottic fold. Appreciate ENT - no concern for mass based on laryngoscopy. Check TFTs. Outpatient thyroid ultrasound.     #T2DM: FS elevated, check A1C. Strict glycemic control given c/f septic joint. Start Lantus 6U qhs + Admelog 2U TID qac, adjust as needed.     #DISPO: Medically active. 68M with hx of T2DM, HTN, elevated PSA/bilateral hydrocele, epididymitis who presents with right neck pain, with c/f septic sternoclavicular joint/OM.     CT neck/chest: Asymmetric widening of the right sternoclavicular joint with fluid in the joint and cortical irregularities involving the sternoclavicular joint and cartilage of the first rib. Soft tissue density material extending from the right lower neck to the posterior aspect of the right clavicle. Findings are overall suspicious for septic sternoclavicular joint and possible underlying osteomyelitis with associated spread of infection into the lower neck and posterior to the clavicle    #Septic sternoclavicular joint: CT neck findings as above. Outpatient BCx 10/23 NTD, UCx 10/23 with Strep agalactiae ?possible source, strep test negative. Repeat cultures pending, MRI pending. Ortho rec MRI/IR eval. Thoracic planning OR tomorrow AM. Per ID - abx changed to [email or web address].     #UTI: Outpatient urine cultures with Strep agalactiae. Empiric abx for above will cover.     #Thyroid nodule/?epiglottic lesion: CT neck showing thyroid nodule and asymmetric thickening of the right aryepiglottic fold. Appreciate ENT - no concern for mass based on laryngoscopy. Check TFTs. Outpatient thyroid ultrasound.     #T2DM: FS elevated, check A1C. Strict glycemic control given c/f septic joint. Start Lantus 6U qhs + Admelog 2U TID qac, adjust as needed.     #DISPO: Medically active.

## 2023-10-27 ENCOUNTER — APPOINTMENT (OUTPATIENT)
Dept: THORACIC SURGERY | Facility: HOSPITAL | Age: 68
End: 2023-10-27

## 2023-10-27 ENCOUNTER — TRANSCRIPTION ENCOUNTER (OUTPATIENT)
Age: 68
End: 2023-10-27

## 2023-10-27 PROBLEM — I10 ESSENTIAL (PRIMARY) HYPERTENSION: Chronic | Status: ACTIVE | Noted: 2023-10-25

## 2023-10-27 PROBLEM — N43.3 HYDROCELE, UNSPECIFIED: Chronic | Status: ACTIVE | Noted: 2023-10-26

## 2023-10-27 LAB
A1C WITH ESTIMATED AVERAGE GLUCOSE RESULT: 7.8 % — HIGH (ref 4–5.6)
A1C WITH ESTIMATED AVERAGE GLUCOSE RESULT: 7.8 % — HIGH (ref 4–5.6)
ALBUMIN SERPL ELPH-MCNC: 4.1 G/DL
ALP BLD-CCNC: 53 U/L
ALT SERPL-CCNC: 31 U/L
ANION GAP SERPL CALC-SCNC: 12 MMOL/L — SIGNIFICANT CHANGE UP (ref 7–14)
ANION GAP SERPL CALC-SCNC: 12 MMOL/L — SIGNIFICANT CHANGE UP (ref 7–14)
ANION GAP SERPL CALC-SCNC: 13 MMOL/L
APPEARANCE: CLEAR
APTT BLD: 29.9 SEC — SIGNIFICANT CHANGE UP (ref 24.5–35.6)
APTT BLD: 29.9 SEC — SIGNIFICANT CHANGE UP (ref 24.5–35.6)
AST SERPL-CCNC: 16 U/L
BACTERIA THROAT CULT: ABNORMAL
BACTERIA UR CULT: ABNORMAL
BACTERIA: NEGATIVE /HPF
BILIRUB SERPL-MCNC: 0.4 MG/DL
BILIRUBIN URINE: NEGATIVE
BLD GP AB SCN SERPL QL: NEGATIVE — SIGNIFICANT CHANGE UP
BLD GP AB SCN SERPL QL: NEGATIVE — SIGNIFICANT CHANGE UP
BLOOD URINE: ABNORMAL
BUN SERPL-MCNC: 13 MG/DL
BUN SERPL-MCNC: 19 MG/DL — SIGNIFICANT CHANGE UP (ref 7–23)
BUN SERPL-MCNC: 19 MG/DL — SIGNIFICANT CHANGE UP (ref 7–23)
CALCIUM SERPL-MCNC: 9.3 MG/DL — SIGNIFICANT CHANGE UP (ref 8.4–10.5)
CALCIUM SERPL-MCNC: 9.3 MG/DL — SIGNIFICANT CHANGE UP (ref 8.4–10.5)
CALCIUM SERPL-MCNC: 9.6 MG/DL
CAST: 0 /LPF
CHLORIDE SERPL-SCNC: 101 MMOL/L
CHLORIDE SERPL-SCNC: 97 MMOL/L — LOW (ref 98–107)
CHLORIDE SERPL-SCNC: 97 MMOL/L — LOW (ref 98–107)
CO2 SERPL-SCNC: 25 MMOL/L — SIGNIFICANT CHANGE UP (ref 22–31)
CO2 SERPL-SCNC: 25 MMOL/L — SIGNIFICANT CHANGE UP (ref 22–31)
CO2 SERPL-SCNC: 27 MMOL/L
COLOR: YELLOW
CREAT SERPL-MCNC: 0.98 MG/DL
CREAT SERPL-MCNC: 0.99 MG/DL — SIGNIFICANT CHANGE UP (ref 0.5–1.3)
CREAT SERPL-MCNC: 0.99 MG/DL — SIGNIFICANT CHANGE UP (ref 0.5–1.3)
CULTURE RESULTS: SIGNIFICANT CHANGE UP
CULTURE RESULTS: SIGNIFICANT CHANGE UP
EBV EA AB SER IA-ACNC: 21.2 U/ML
EBV EA AB TITR SER IF: POSITIVE
EBV EA IGG SER QL IA: 62.8 U/ML
EBV EA IGG SER-ACNC: POSITIVE
EBV EA IGM SER IA-ACNC: NEGATIVE
EBV PATRN SPEC IB-IMP: NORMAL
EBV VCA IGG SER IA-ACNC: >750 U/ML
EBV VCA IGM SER QL IA: <10 U/ML
EGFR: 83 ML/MIN/1.73M2 — SIGNIFICANT CHANGE UP
EGFR: 83 ML/MIN/1.73M2 — SIGNIFICANT CHANGE UP
EGFR: 85 ML/MIN/1.73M2
EPITHELIAL CELLS: 3 /HPF
EPSTEIN-BARR VIRUS CAPSID ANTIGEN IGG: POSITIVE
ERYTHROCYTE [SEDIMENTATION RATE] IN BLOOD BY WESTERGREN METHOD: 63 MM/HR
ESTIMATED AVERAGE GLUCOSE: 177 — SIGNIFICANT CHANGE UP
ESTIMATED AVERAGE GLUCOSE: 177 — SIGNIFICANT CHANGE UP
ESTIMATED AVERAGE GLUCOSE: 194 MG/DL
FRUCTOSAMINE SERPL-MCNC: 303 UMOL/L
GLUCOSE BLDC GLUCOMTR-MCNC: 125 MG/DL — HIGH (ref 70–99)
GLUCOSE BLDC GLUCOMTR-MCNC: 125 MG/DL — HIGH (ref 70–99)
GLUCOSE BLDC GLUCOMTR-MCNC: 130 MG/DL — HIGH (ref 70–99)
GLUCOSE BLDC GLUCOMTR-MCNC: 130 MG/DL — HIGH (ref 70–99)
GLUCOSE BLDC GLUCOMTR-MCNC: 136 MG/DL — HIGH (ref 70–99)
GLUCOSE BLDC GLUCOMTR-MCNC: 136 MG/DL — HIGH (ref 70–99)
GLUCOSE BLDC GLUCOMTR-MCNC: 209 MG/DL — HIGH (ref 70–99)
GLUCOSE BLDC GLUCOMTR-MCNC: 209 MG/DL — HIGH (ref 70–99)
GLUCOSE BLDC GLUCOMTR-MCNC: 229 MG/DL — HIGH (ref 70–99)
GLUCOSE BLDC GLUCOMTR-MCNC: 229 MG/DL — HIGH (ref 70–99)
GLUCOSE QUALITATIVE U: >=1000 MG/DL
GLUCOSE SERPL-MCNC: 133 MG/DL — HIGH (ref 70–99)
GLUCOSE SERPL-MCNC: 133 MG/DL — HIGH (ref 70–99)
GLUCOSE SERPL-MCNC: 70 MG/DL
GRAM STN FLD: SIGNIFICANT CHANGE UP
HBA1C MFR BLD HPLC: 8.4 %
HCT VFR BLD CALC: 38.2 % — LOW (ref 39–50)
HCT VFR BLD CALC: 38.2 % — LOW (ref 39–50)
HCT VFR BLD CALC: 43 %
HCV AB S/CO SERPL IA: 0.08 S/CO — SIGNIFICANT CHANGE UP (ref 0–0.99)
HCV AB S/CO SERPL IA: 0.08 S/CO — SIGNIFICANT CHANGE UP (ref 0–0.99)
HCV AB SERPL-IMP: SIGNIFICANT CHANGE UP
HCV AB SERPL-IMP: SIGNIFICANT CHANGE UP
HGB BLD-MCNC: 12.8 G/DL — LOW (ref 13–17)
HGB BLD-MCNC: 12.8 G/DL — LOW (ref 13–17)
HGB BLD-MCNC: 13.9 G/DL
INFLUENZA A RESULT: NOT DETECTED
INFLUENZA B RESULT: NOT DETECTED
INR BLD: 1.23 RATIO — HIGH (ref 0.85–1.18)
INR BLD: 1.23 RATIO — HIGH (ref 0.85–1.18)
KETONES URINE: NEGATIVE MG/DL
LEUKOCYTE ESTERASE URINE: NEGATIVE
MAGNESIUM SERPL-MCNC: 2.2 MG/DL — SIGNIFICANT CHANGE UP (ref 1.6–2.6)
MAGNESIUM SERPL-MCNC: 2.2 MG/DL — SIGNIFICANT CHANGE UP (ref 1.6–2.6)
MCHC RBC-ENTMCNC: 29 PG
MCHC RBC-ENTMCNC: 29 PG — SIGNIFICANT CHANGE UP (ref 27–34)
MCHC RBC-ENTMCNC: 29 PG — SIGNIFICANT CHANGE UP (ref 27–34)
MCHC RBC-ENTMCNC: 32.3 GM/DL
MCHC RBC-ENTMCNC: 33.5 GM/DL — SIGNIFICANT CHANGE UP (ref 32–36)
MCHC RBC-ENTMCNC: 33.5 GM/DL — SIGNIFICANT CHANGE UP (ref 32–36)
MCV RBC AUTO: 86.4 FL — SIGNIFICANT CHANGE UP (ref 80–100)
MCV RBC AUTO: 86.4 FL — SIGNIFICANT CHANGE UP (ref 80–100)
MCV RBC AUTO: 89.8 FL
MICROSCOPIC-UA: NORMAL
NIGHT BLUE STAIN TISS: SIGNIFICANT CHANGE UP
NITRITE URINE: NEGATIVE
NRBC # BLD: 0 /100 WBCS — SIGNIFICANT CHANGE UP (ref 0–0)
NRBC # BLD: 0 /100 WBCS — SIGNIFICANT CHANGE UP (ref 0–0)
NRBC # FLD: 0 K/UL — SIGNIFICANT CHANGE UP (ref 0–0)
NRBC # FLD: 0 K/UL — SIGNIFICANT CHANGE UP (ref 0–0)
PH URINE: 6.5
PHOSPHATE SERPL-MCNC: 2.9 MG/DL — SIGNIFICANT CHANGE UP (ref 2.5–4.5)
PHOSPHATE SERPL-MCNC: 2.9 MG/DL — SIGNIFICANT CHANGE UP (ref 2.5–4.5)
PLATELET # BLD AUTO: 353 K/UL
PLATELET # BLD AUTO: 390 K/UL — SIGNIFICANT CHANGE UP (ref 150–400)
PLATELET # BLD AUTO: 390 K/UL — SIGNIFICANT CHANGE UP (ref 150–400)
POTASSIUM SERPL-MCNC: 3.9 MMOL/L — SIGNIFICANT CHANGE UP (ref 3.5–5.3)
POTASSIUM SERPL-MCNC: 3.9 MMOL/L — SIGNIFICANT CHANGE UP (ref 3.5–5.3)
POTASSIUM SERPL-SCNC: 3.9 MMOL/L — SIGNIFICANT CHANGE UP (ref 3.5–5.3)
POTASSIUM SERPL-SCNC: 3.9 MMOL/L — SIGNIFICANT CHANGE UP (ref 3.5–5.3)
POTASSIUM SERPL-SCNC: 4.2 MMOL/L
PROT SERPL-MCNC: 7.5 G/DL
PROTEIN URINE: NORMAL MG/DL
PROTHROM AB SERPL-ACNC: 13.8 SEC — HIGH (ref 9.5–13)
PROTHROM AB SERPL-ACNC: 13.8 SEC — HIGH (ref 9.5–13)
PSA FREE FLD-MCNC: 14 %
PSA FREE SERPL-MCNC: 1.75 NG/ML
PSA SERPL-MCNC: 12.5 NG/ML
RBC # BLD: 4.42 M/UL — SIGNIFICANT CHANGE UP (ref 4.2–5.8)
RBC # BLD: 4.42 M/UL — SIGNIFICANT CHANGE UP (ref 4.2–5.8)
RBC # BLD: 4.79 M/UL
RBC # FLD: 13.9 % — SIGNIFICANT CHANGE UP (ref 10.3–14.5)
RBC # FLD: 13.9 % — SIGNIFICANT CHANGE UP (ref 10.3–14.5)
RBC # FLD: 14.4 %
RED BLOOD CELLS URINE: 21 /HPF
RESP SYN VIRUS RESULT: NOT DETECTED
RH IG SCN BLD-IMP: POSITIVE — SIGNIFICANT CHANGE UP
RH IG SCN BLD-IMP: POSITIVE — SIGNIFICANT CHANGE UP
S PYO AG SPEC QL IA: NORMAL
SARS-COV-2 RESULT: NOT DETECTED
SODIUM SERPL-SCNC: 134 MMOL/L — LOW (ref 135–145)
SODIUM SERPL-SCNC: 134 MMOL/L — LOW (ref 135–145)
SODIUM SERPL-SCNC: 141 MMOL/L
SPECIFIC GRAVITY URINE: >1.03
SPECIMEN SOURCE: SIGNIFICANT CHANGE UP
T4 FREE SERPL-MCNC: 1.1 NG/DL — SIGNIFICANT CHANGE UP (ref 0.9–1.8)
T4 FREE SERPL-MCNC: 1.1 NG/DL — SIGNIFICANT CHANGE UP (ref 0.9–1.8)
TSH SERPL-MCNC: 0.9 UIU/ML — SIGNIFICANT CHANGE UP (ref 0.27–4.2)
TSH SERPL-MCNC: 0.9 UIU/ML — SIGNIFICANT CHANGE UP (ref 0.27–4.2)
UROBILINOGEN URINE: 1 MG/DL
WBC # BLD: 20.72 K/UL — HIGH (ref 3.8–10.5)
WBC # BLD: 20.72 K/UL — HIGH (ref 3.8–10.5)
WBC # FLD AUTO: 13.82 K/UL
WBC # FLD AUTO: 20.72 K/UL — HIGH (ref 3.8–10.5)
WBC # FLD AUTO: 20.72 K/UL — HIGH (ref 3.8–10.5)
WHITE BLOOD CELLS URINE: 2 /HPF

## 2023-10-27 PROCEDURE — 11045 DBRDMT SUBQ TISS EACH ADDL: CPT | Mod: 59

## 2023-10-27 PROCEDURE — 11042 DBRDMT SUBQ TIS 1ST 20SQCM/<: CPT | Mod: 59

## 2023-10-27 PROCEDURE — 88304 TISSUE EXAM BY PATHOLOGIST: CPT | Mod: 26

## 2023-10-27 PROCEDURE — 99232 SBSQ HOSP IP/OBS MODERATE 35: CPT

## 2023-10-27 PROCEDURE — 15734 MUSCLE-SKIN GRAFT TRUNK: CPT

## 2023-10-27 PROCEDURE — 88311 DECALCIFY TISSUE: CPT | Mod: 26

## 2023-10-27 PROCEDURE — 99233 SBSQ HOSP IP/OBS HIGH 50: CPT | Mod: GC

## 2023-10-27 PROCEDURE — 11043 DBRDMT MUSC&/FSCA 1ST 20/<: CPT | Mod: 59

## 2023-10-27 PROCEDURE — 23180 PRTL EXCISION BONE CLAVICLE: CPT

## 2023-10-27 PROCEDURE — 11046 DBRDMT MUSC&/FSCA EA ADDL: CPT | Mod: 59

## 2023-10-27 PROCEDURE — 11044 DBRDMT BONE 1ST 20 SQ CM/<: CPT | Mod: 59

## 2023-10-27 PROCEDURE — 71045 X-RAY EXAM CHEST 1 VIEW: CPT | Mod: 26

## 2023-10-27 DEVICE — BONE WAX 2.5GM: Type: IMPLANTABLE DEVICE | Site: RIGHT | Status: FUNCTIONAL

## 2023-10-27 DEVICE — SURGICEL NU-KNIT 6 X 9": Type: IMPLANTABLE DEVICE | Site: RIGHT | Status: FUNCTIONAL

## 2023-10-27 RX ORDER — KETOROLAC TROMETHAMINE 30 MG/ML
15 SYRINGE (ML) INJECTION EVERY 6 HOURS
Refills: 0 | Status: DISCONTINUED | OUTPATIENT
Start: 2023-10-27 | End: 2023-10-31

## 2023-10-27 RX ORDER — ENOXAPARIN SODIUM 100 MG/ML
40 INJECTION SUBCUTANEOUS EVERY 24 HOURS
Refills: 0 | Status: DISCONTINUED | OUTPATIENT
Start: 2023-10-28 | End: 2023-10-31

## 2023-10-27 RX ADMIN — AMPICILLIN SODIUM AND SULBACTAM SODIUM 200 GRAM(S): 250; 125 INJECTION, POWDER, FOR SUSPENSION INTRAMUSCULAR; INTRAVENOUS at 12:16

## 2023-10-27 RX ADMIN — Medication 2 UNIT(S): at 18:11

## 2023-10-27 RX ADMIN — AMPICILLIN SODIUM AND SULBACTAM SODIUM 200 GRAM(S): 250; 125 INJECTION, POWDER, FOR SUSPENSION INTRAMUSCULAR; INTRAVENOUS at 23:41

## 2023-10-27 RX ADMIN — Medication 10 MILLIGRAM(S): at 05:44

## 2023-10-27 RX ADMIN — AMPICILLIN SODIUM AND SULBACTAM SODIUM 200 GRAM(S): 250; 125 INJECTION, POWDER, FOR SUSPENSION INTRAMUSCULAR; INTRAVENOUS at 18:11

## 2023-10-27 RX ADMIN — AMPICILLIN SODIUM AND SULBACTAM SODIUM 200 GRAM(S): 250; 125 INJECTION, POWDER, FOR SUSPENSION INTRAMUSCULAR; INTRAVENOUS at 05:42

## 2023-10-27 RX ADMIN — AMPICILLIN SODIUM AND SULBACTAM SODIUM 200 GRAM(S): 250; 125 INJECTION, POWDER, FOR SUSPENSION INTRAMUSCULAR; INTRAVENOUS at 00:26

## 2023-10-27 RX ADMIN — Medication 2: at 18:10

## 2023-10-27 RX ADMIN — INSULIN GLARGINE 6 UNIT(S): 100 INJECTION, SOLUTION SUBCUTANEOUS at 22:43

## 2023-10-27 RX ADMIN — Medication 2 UNIT(S): at 12:57

## 2023-10-27 NOTE — PROGRESS NOTE ADULT - SUBJECTIVE AND OBJECTIVE BOX
**  Ilir Bourgeois  PGY-1 Medicine  **    PATRICK KAMARA ([id number])    Patient is a 68y old  Male who presents with a chief complaint of Septic arthritis (26 Oct 2023 07:00)      INTERVAL HPI/OVERNIGHT EVENTS:   NAEON, AM taken by thoracic surgery for R SC joint irrigation and debridement. Patient in OR in AM; unable to examine.      LABS:                          13.4   19.29 )-----------( 378      ( 25 Oct 2023 19:30 )             40.1     10-25    137  |  98  |  13  ----------------------------<  114<H>  4.1   |  23  |  0.93    Ca    9.6      25 Oct 2023 19:30  Phos  3.5     10-25  Mg     1.80     10-25    TPro  7.8  /  Alb  3.8  /  TBili  0.5  /  DBili  x   /  AST  17  /  ALT  27  /  AlkPhos  51  10-25          Urinalysis Basic - ( 25 Oct 2023 19:30 )    Color: x / Appearance: x / SG: x / pH: x  Gluc: 114 mg/dL / Ketone: x  / Bili: x / Urobili: x   Blood: x / Protein: x / Nitrite: x   Leuk Esterase: x / RBC: x / WBC x   Sq Epi: x / Non Sq Epi: x / Bacteria: x        Lactate Trend        CAPILLARY BLOOD GLUCOSE      POCT Blood Glucose.: 267 mg/dL (26 Oct 2023 04:56)      Medications:  dextrose 5%. 1000 milliLiter(s) IV Continuous <Continuous>  dextrose 5%. 1000 milliLiter(s) IV Continuous <Continuous>  dextrose 50% Injectable 25 Gram(s) IV Push once  dextrose 50% Injectable 12.5 Gram(s) IV Push once  dextrose 50% Injectable 25 Gram(s) IV Push once  dextrose Oral Gel 15 Gram(s) Oral once PRN  glucagon  Injectable 1 milliGRAM(s) IntraMuscular once  influenza  Vaccine (HIGH DOSE) 0.7 milliLiter(s) IntraMuscular once  insulin lispro (ADMELOG) corrective regimen sliding scale   SubCutaneous three times a day before meals  insulin lispro (ADMELOG) corrective regimen sliding scale   SubCutaneous at bedtime  piperacillin/tazobactam IVPB.. 3.375 Gram(s) IV Intermittent every 8 hours  vancomycin  IVPB 1250 milliGRAM(s) IV Intermittent every 12 hours      RADIOLOGY & ADDITIONAL TESTS were viewed by me personally.

## 2023-10-27 NOTE — CHART NOTE - NSCHARTNOTEFT_GEN_A_CORE
Patient s/p right sternoclavicular joint resection, muscle flap.  Patient resting, Site with dressing clean and dry.  REYNA drain to bulb suction.   Vital Signs Last 24 Hrs  T(C): 36.6 (27 Oct 2023 13:39), Max: 37.1 (27 Oct 2023 05:05)  T(F): 97.9 (27 Oct 2023 13:39), Max: 98.8 (27 Oct 2023 05:05)  HR: 81 (27 Oct 2023 13:39) (81 - 100)  BP: 119/94 (27 Oct 2023 13:39) (109/80 - 137/83)  BP(mean): 83 (27 Oct 2023 10:45) (83 - 95)  RR: 18 (27 Oct 2023 13:39) (12 - 23)  SpO2: 95% (27 Oct 2023 13:39) (95% - 100%)    Parameters below as of 27 Oct 2023 13:39  Patient On (Oxygen Delivery Method): room air    I&O's Detail    27 Oct 2023 07:01  -  27 Oct 2023 20:10  --------------------------------------------------------  IN:    Oral Fluid: 150 mL  Total IN: 150 mL    OUT:    Bulb (mL): 45 mL  Total OUT: 45 mL    Total NET: 105 mL      MEDICATIONS  (STANDING):  ampicillin/sulbactam  IVPB 3 Gram(s) IV Intermittent every 6 hours  dextrose 50% Injectable 25 Gram(s) IV Push once  enalapril 10 milliGRAM(s) Oral daily  influenza  Vaccine (HIGH DOSE) 0.7 milliLiter(s) IntraMuscular once  insulin glargine Injectable (LANTUS) 6 Unit(s) SubCutaneous at bedtime  insulin lispro (ADMELOG) corrective regimen sliding scale   SubCutaneous three times a day before meals  insulin lispro (ADMELOG) corrective regimen sliding scale   SubCutaneous at bedtime  insulin lispro Injectable (ADMELOG) 2 Unit(s) SubCutaneous three times a day before meals    MEDICATIONS  (PRN):  acetaminophen     Tablet .. 975 milliGRAM(s) Oral every 8 hours PRN Mild Pain (1 - 3), Moderate Pain (4 - 6), Severe Pain (7 - 10)  ketorolac   Injectable 15 milliGRAM(s) IV Push every 6 hours PRN Severe Pain (7 - 10)    A/p: S/P Right sternoclavicular joint resection, muscle flap   Continue REYNA to bulb suction   Pain management   Continue care as per primary team

## 2023-10-27 NOTE — PROGRESS NOTE ADULT - SUBJECTIVE AND OBJECTIVE BOX
Follow Up:  septic joint, osteo    Interval History/ROS: no fever, cough, diarrhea, went to OR today          Allergies  No Known Allergies        ANTIMICROBIALS:  ampicillin/sulbactam  IVPB 3 every 6 hours      OTHER MEDS:  acetaminophen     Tablet .. 975 milliGRAM(s) Oral every 8 hours PRN  dextrose 50% Injectable 25 Gram(s) IV Push once  enalapril 10 milliGRAM(s) Oral daily  influenza  Vaccine (HIGH DOSE) 0.7 milliLiter(s) IntraMuscular once  insulin glargine Injectable (LANTUS) 6 Unit(s) SubCutaneous at bedtime  insulin lispro (ADMELOG) corrective regimen sliding scale   SubCutaneous three times a day before meals  insulin lispro (ADMELOG) corrective regimen sliding scale   SubCutaneous at bedtime  insulin lispro Injectable (ADMELOG) 2 Unit(s) SubCutaneous three times a day before meals  ketorolac   Injectable 15 milliGRAM(s) IV Push every 6 hours PRN      Vital Signs Last 24 Hrs  T(C): 36.6 (27 Oct 2023 13:39), Max: 37.1 (27 Oct 2023 05:05)  T(F): 97.9 (27 Oct 2023 13:39), Max: 98.8 (27 Oct 2023 05:05)  HR: 81 (27 Oct 2023 13:39) (81 - 100)  BP: 119/94 (27 Oct 2023 13:39) (109/80 - 137/83)  BP(mean): 83 (27 Oct 2023 10:45) (83 - 95)  RR: 18 (27 Oct 2023 13:39) (12 - 23)  SpO2: 95% (27 Oct 2023 13:39) (95% - 100%)    Parameters below as of 27 Oct 2023 13:39  Patient On (Oxygen Delivery Method): room air        Physical Exam:  General:    NAD,  non toxic  Respiratory:    comfortable on RA  abd:     soft,     no tenderness  :   no CVAT,  no suprapubic tenderness,   no  morin  Musculoskeletal:   chest with dressing  vascular: no phlebitis  Skin:    no rash                          12.8   20.72 )-----------( 390      ( 27 Oct 2023 02:40 )             38.2       10-27    134<L>  |  97<L>  |  19  ----------------------------<  133<H>  3.9   |  25  |  0.99    Ca    9.3      27 Oct 2023 02:40  Phos  2.9     10-27  Mg     2.20     10-27    TPro  7.8  /  Alb  3.8  /  TBili  0.5  /  DBili  x   /  AST  17  /  ALT  27  /  AlkPhos  51  10-25      Urinalysis Basic - ( 27 Oct 2023 02:40 )    Color: x / Appearance: x / SG: x / pH: x  Gluc: 133 mg/dL / Ketone: x  / Bili: x / Urobili: x   Blood: x / Protein: x / Nitrite: x   Leuk Esterase: x / RBC: x / WBC x   Sq Epi: x / Non Sq Epi: x / Bacteria: x        MICROBIOLOGY:  v  .Smear  10-27-23 --  --    No polymorphonuclear cells seen per low power field  No organisms seen per oil power field      Clean Catch Clean Catch (Midstream)  10-26-23   <10,000 CFU/mL Normal Urogenital Louise  --  --      .Blood Blood-Venous  10-25-23   No growth at 24 hours  --  --      .Blood Blood-Peripheral  10-25-23   No growth at 24 hours  --  --                RADIOLOGY:  Images independently visualized and reviewed personally, findings as below  < from: Xray Chest 1 View- PORTABLE-Urgent (Xray Chest 1 View- PORTABLE-Urgent .) (10.27.23 @ 10:23) >    IMPRESSION:  Clear lungs.    < end of copied text >  < from: CT Chest w/ IV Cont (10.25.23 @ 22:26) >  IMPRESSION:    1. Asymmetric widening of the right sternoclavicular joint with fluid in   the joint and cortical irregularities involving the sternoclavicular   joint and cartilage of the first rib. Softtissue density material   extending from the right lower neck to the posterior aspect of the right   clavicle. Findings are overall suspicious for septic sternoclavicular   joint and possible underlying osteomyelitis with associated spread of   infection into the lower neck and posterior to the clavicle. MRI is   recommended for further evaluation.    2. Hypodense thyroid nodules for which nonurgent follow-up ultrasound is   recommended.    3. Small right pleural effusion and mild right basilar atelectasis.      < end of copied text >

## 2023-10-27 NOTE — PROGRESS NOTE ADULT - PROBLEM SELECTOR PLAN 1
CT Chest showing concern for osteomyelitis vs Septic joint of R Sternoclavicular joint. Patient Afebrile, with only mild subclinical fever, although 50-60% of septic arthritis can present afebrile. Patient with improved ROM with pain medication, which is a bit atypical for a septic joint. Concern for radiologic evidence of Septic Arthritis with possible osteo    -Elevated ESR and CRP  -Patient with U/A positive for GBS; can consider as source of infection  -Discussed with Orthopedic surgery; will need CT Surgery for management    Plan:  Vanc/Zosyn 10/25 - 10/26. Unasyn (10-26 - )  BCx 10/23 - NGTD (outpatient in [facility])  BCx 10/25 - NGTD   Urine culture outpt growing strep anginosa pending succeptibility  U/A negative  F/U Thoracic Surgery Recs  MR Shoulder Joint for further evaluation of potential osteo CT Chest showing concern for osteomyelitis vs Septic joint of R Sternoclavicular joint. Patient Afebrile, with only mild subclinical fever, although 50-60% of septic arthritis can present afebrile. Patient with improved ROM with pain medication, which is a bit atypical for a septic joint. Concern for radiologic evidence of Septic Arthritis with possible osteo    -Elevated ESR and CRP  -Patient with U/A positive for GBS; can consider as source of infection  -Discussed with Orthopedic surgery; will need CT Surgery for management    Plan:  Vanc/Zosyn 10/25 - 10/26. Unasyn (10-26 - )  BCx 10/23 - NGTD (outpatient in [facility])  BCx 10/25 - NGTD   Urine culture outpt growing strep anginosa pending succeptibility  U/A negative  Throat culture  strep anginosus   F/U Thoracic Surgery Recs  MR Shoulder Joint for further evaluation of potential osteo

## 2023-10-27 NOTE — PROGRESS NOTE ADULT - ASSESSMENT
68M w/HTN T2DM, HLD, Elevated PSA/Bilateral Hydrocele s/p epididymitis treatment presents with concern for septic arthritis iso radiographic evidence and severe shoulder pain with possible source being GBS UTI; now started on IV Abx, s/p debridement and joint irrigation per thoracic surgery (10/27).

## 2023-10-27 NOTE — BRIEF OPERATIVE NOTE - NSICDXBRIEFPREOP_GEN_ALL_CORE_FT
PRE-OP DIAGNOSIS:  Septic arthritis of right sternoclavicular joint 27-Oct-2023 10:08:54  Jah Kwan

## 2023-10-27 NOTE — PROGRESS NOTE ADULT - ATTENDING COMMENTS
68M with hx of T2DM, HTN, elevated PSA/bilateral hydrocele, epididymitis who presents with right neck pain, with c/f septic sternoclavicular joint/OM.     CT neck/chest: Asymmetric widening of the right sternoclavicular joint with fluid in the joint and cortical irregularities involving the sternoclavicular joint and cartilage of the first rib. Soft tissue density material extending from the right lower neck to the posterior aspect of the right clavicle. Findings are overall suspicious for septic sternoclavicular joint and possible underlying osteomyelitis with associated spread of infection into the lower neck and posterior to the clavicle    #Septic sternoclavicular joint: CT neck findings as above. Outpatient BCx 10/23 NTD, UCx 10/23 with Strep agalactiae ?possible source, throat culture 10/23 with Strep anginosis sensitivities pending. Repeat cultures in house NTD, MRI pending. OR with thoracic today - R SC joint irrigation and debridement. Per ID - abx changed to [email or web address].     #UTI: Outpatient urine cultures with Strep agalactiae. Empiric abx for above will cover.     #Thyroid nodule/?epiglottic lesion: CT neck showing thyroid nodule and asymmetric thickening of the right aryepiglottic fold. Appreciate ENT - no concern for mass based on laryngoscopy. Check TFTs. Outpatient thyroid ultrasound.     #T2DM: A1C 7.8%. Strict glycemic control given c/f septic joint. Start Lantus 6U qhs + Admelog 2U TID qac, adjust as needed.     #DISPO: Medically active. 68M with hx of T2DM, HTN, elevated PSA/bilateral hydrocele, epididymitis who presents with right neck pain, with c/f septic sternoclavicular joint/OM.     CT neck/chest: Asymmetric widening of the right sternoclavicular joint with fluid in the joint and cortical irregularities involving the sternoclavicular joint and cartilage of the first rib. Soft tissue density material extending from the right lower neck to the posterior aspect of the right clavicle. Findings are overall suspicious for septic sternoclavicular joint and possible underlying osteomyelitis with associated spread of infection into the lower neck and posterior to the clavicle    #Septic sternoclavicular joint: CT neck findings as above. Outpatient BCx 10/23 NTD, UCx 10/23 with Strep agalactiae ?possible source, throat culture 10/23 with Strep anginosis sensitivities pending. Repeat cultures in house NTD, MRI pending. OR with thoracic today - R SC joint irrigation and debridement. Per ID - abx changed to [email or web address].     #UTI: Outpatient urine cultures with Strep agalactiae. Empiric abx for above will cover.     #Thyroid nodule/?epiglottic lesion: CT neck showing thyroid nodule and asymmetric thickening of the right aryepiglottic fold. Appreciate ENT - no concern for mass based on laryngoscopy. Check TFTs. Outpatient thyroid ultrasound.     #T2DM: A1C 7.8%. Strict glycemic control given c/f septic joint. Start Lantus 6U qhs + Admelog 2U TID qac, adjust as needed.    #DISPO: Medically active. 68M with hx of T2DM, HTN, elevated PSA/bilateral hydrocele, epididymitis who presents with right neck pain, with c/f septic sternoclavicular joint/OM.     CT neck/chest: Asymmetric widening of the right sternoclavicular joint with fluid in the joint and cortical irregularities involving the sternoclavicular joint and cartilage of the first rib. Soft tissue density material extending from the right lower neck to the posterior aspect of the right clavicle. Findings are overall suspicious for septic sternoclavicular joint and possible underlying osteomyelitis with associated spread of infection into the lower neck and posterior to the clavicle    #Septic sternoclavicular joint: CT neck findings as above. Outpatient BCx 10/23 NTD, UCx 10/23 with Strep agalactiae ?possible source, throat culture 10/23 with Strep anginosis sensitivities pending. Repeat cultures in house NTD, MRI pending. OR with thoracic today - s/p resection of right sternoclavicular joint with muscle advancement flap - cultures sent. Per ID - abx changed to [email or web address].     #UTI: Outpatient urine cultures with Strep agalactiae. Empiric abx for above will cover.     #Thyroid nodule/?epiglottic lesion: CT neck showing thyroid nodule and asymmetric thickening of the right aryepiglottic fold. Appreciate ENT - no concern for mass based on laryngoscopy. Check TFTs. Outpatient thyroid ultrasound.     #T2DM: A1C 7.8%. Strict glycemic control given c/f septic joint. Start Lantus 6U qhs + Admelog 2U TID qac, adjust as needed.    #DISPO: Medically active.

## 2023-10-27 NOTE — BRIEF OPERATIVE NOTE - NSICDXBRIEFPOSTOP_GEN_ALL_CORE_FT
POST-OP DIAGNOSIS:  Septic arthritis of right sternoclavicular joint 27-Oct-2023 10:09:03  Jah Kwan

## 2023-10-27 NOTE — PROGRESS NOTE ADULT - ASSESSMENT
68 m with DM, HTN, HLD, Elevated PSA/Hydrocele, p/w R chest pain, neck, throat pain, edema, hoarseness and hematuria  u/a outpatient showed hematuria and urine cx: 10-49K GBS  throat cx: strep anginosis  afebrile, WBC: 19, ESR: 78  CRP: 198   CT: Asymmetric widening of the R sternoclavicular joint with fluid in the joint and cortical irregularities involving the sternoclavicular joint and cartilage of the first rib. Soft tissue density material extending from the right lower neck to the posterior aspect of the right clavicle, s/o septic sternoclavicular joint and possible underlying osteomyelitis with associated spread of infection into the lower neck and posterior to the clavicle.   neck CT: Low-density soft tissue in the right lower neck extending into the chest, suspected to be related to sternoclavicular infection, Asymmetric thickening of the right aryepiglottic fold. Underlying mass is not excluded.     strep anginosis of throat and neck extending to R sternoclavicular joint with septic arthritis and possible osteo  hematuria, unclear etiology, now u/a unremarkable, blood and urine cx negative  s/p OR 10/27, s/presection of right sternoclavicular joint with muscle advancement flap cultures sent    * c/w unasyn 3 q 6  * f/u the OR cx  * monitor CBc/diff and CMP    The above assessment and plan was discussed with the primary team    Bernadette Delarosa MD  contact on teams  After 5pm and on weekends call [phone].

## 2023-10-27 NOTE — BRIEF OPERATIVE NOTE - NSICDXBRIEFPROCEDURE_GEN_ALL_CORE_FT
PROCEDURES:  Open resection of right sternoclavicular joint 27-Oct-2023 10:08:26  Jah Kwan  Creation of muscle flap of neck 27-Oct-2023 10:08:37  Jah Kwan

## 2023-10-28 LAB
ANION GAP SERPL CALC-SCNC: 12 MMOL/L — SIGNIFICANT CHANGE UP (ref 7–14)
ANION GAP SERPL CALC-SCNC: 12 MMOL/L — SIGNIFICANT CHANGE UP (ref 7–14)
BUN SERPL-MCNC: 26 MG/DL — HIGH (ref 7–23)
BUN SERPL-MCNC: 26 MG/DL — HIGH (ref 7–23)
CALCIUM SERPL-MCNC: 9.4 MG/DL — SIGNIFICANT CHANGE UP (ref 8.4–10.5)
CALCIUM SERPL-MCNC: 9.4 MG/DL — SIGNIFICANT CHANGE UP (ref 8.4–10.5)
CHLORIDE SERPL-SCNC: 99 MMOL/L — SIGNIFICANT CHANGE UP (ref 98–107)
CHLORIDE SERPL-SCNC: 99 MMOL/L — SIGNIFICANT CHANGE UP (ref 98–107)
CO2 SERPL-SCNC: 27 MMOL/L — SIGNIFICANT CHANGE UP (ref 22–31)
CO2 SERPL-SCNC: 27 MMOL/L — SIGNIFICANT CHANGE UP (ref 22–31)
CREAT SERPL-MCNC: 1.04 MG/DL — SIGNIFICANT CHANGE UP (ref 0.5–1.3)
CREAT SERPL-MCNC: 1.04 MG/DL — SIGNIFICANT CHANGE UP (ref 0.5–1.3)
EGFR: 79 ML/MIN/1.73M2 — SIGNIFICANT CHANGE UP
EGFR: 79 ML/MIN/1.73M2 — SIGNIFICANT CHANGE UP
GLUCOSE BLDC GLUCOMTR-MCNC: 111 MG/DL — HIGH (ref 70–99)
GLUCOSE BLDC GLUCOMTR-MCNC: 111 MG/DL — HIGH (ref 70–99)
GLUCOSE BLDC GLUCOMTR-MCNC: 113 MG/DL — HIGH (ref 70–99)
GLUCOSE BLDC GLUCOMTR-MCNC: 113 MG/DL — HIGH (ref 70–99)
GLUCOSE BLDC GLUCOMTR-MCNC: 128 MG/DL — HIGH (ref 70–99)
GLUCOSE BLDC GLUCOMTR-MCNC: 128 MG/DL — HIGH (ref 70–99)
GLUCOSE BLDC GLUCOMTR-MCNC: 190 MG/DL — HIGH (ref 70–99)
GLUCOSE BLDC GLUCOMTR-MCNC: 190 MG/DL — HIGH (ref 70–99)
GLUCOSE SERPL-MCNC: 137 MG/DL — HIGH (ref 70–99)
GLUCOSE SERPL-MCNC: 137 MG/DL — HIGH (ref 70–99)
GRAM STN FLD: ABNORMAL
HCT VFR BLD CALC: 36.6 % — LOW (ref 39–50)
HCT VFR BLD CALC: 36.6 % — LOW (ref 39–50)
HGB BLD-MCNC: 12.1 G/DL — LOW (ref 13–17)
HGB BLD-MCNC: 12.1 G/DL — LOW (ref 13–17)
MAGNESIUM SERPL-MCNC: 2.2 MG/DL — SIGNIFICANT CHANGE UP (ref 1.6–2.6)
MAGNESIUM SERPL-MCNC: 2.2 MG/DL — SIGNIFICANT CHANGE UP (ref 1.6–2.6)
MCHC RBC-ENTMCNC: 28.8 PG — SIGNIFICANT CHANGE UP (ref 27–34)
MCHC RBC-ENTMCNC: 28.8 PG — SIGNIFICANT CHANGE UP (ref 27–34)
MCHC RBC-ENTMCNC: 33.1 GM/DL — SIGNIFICANT CHANGE UP (ref 32–36)
MCHC RBC-ENTMCNC: 33.1 GM/DL — SIGNIFICANT CHANGE UP (ref 32–36)
MCV RBC AUTO: 87.1 FL — SIGNIFICANT CHANGE UP (ref 80–100)
MCV RBC AUTO: 87.1 FL — SIGNIFICANT CHANGE UP (ref 80–100)
NRBC # BLD: 0 /100 WBCS — SIGNIFICANT CHANGE UP (ref 0–0)
NRBC # BLD: 0 /100 WBCS — SIGNIFICANT CHANGE UP (ref 0–0)
NRBC # FLD: 0 K/UL — SIGNIFICANT CHANGE UP (ref 0–0)
NRBC # FLD: 0 K/UL — SIGNIFICANT CHANGE UP (ref 0–0)
PHOSPHATE SERPL-MCNC: 2.5 MG/DL — SIGNIFICANT CHANGE UP (ref 2.5–4.5)
PHOSPHATE SERPL-MCNC: 2.5 MG/DL — SIGNIFICANT CHANGE UP (ref 2.5–4.5)
PLATELET # BLD AUTO: 425 K/UL — HIGH (ref 150–400)
PLATELET # BLD AUTO: 425 K/UL — HIGH (ref 150–400)
POTASSIUM SERPL-MCNC: 4.2 MMOL/L — SIGNIFICANT CHANGE UP (ref 3.5–5.3)
POTASSIUM SERPL-MCNC: 4.2 MMOL/L — SIGNIFICANT CHANGE UP (ref 3.5–5.3)
POTASSIUM SERPL-SCNC: 4.2 MMOL/L — SIGNIFICANT CHANGE UP (ref 3.5–5.3)
POTASSIUM SERPL-SCNC: 4.2 MMOL/L — SIGNIFICANT CHANGE UP (ref 3.5–5.3)
RBC # BLD: 4.2 M/UL — SIGNIFICANT CHANGE UP (ref 4.2–5.8)
RBC # BLD: 4.2 M/UL — SIGNIFICANT CHANGE UP (ref 4.2–5.8)
RBC # FLD: 14.2 % — SIGNIFICANT CHANGE UP (ref 10.3–14.5)
RBC # FLD: 14.2 % — SIGNIFICANT CHANGE UP (ref 10.3–14.5)
SODIUM SERPL-SCNC: 138 MMOL/L — SIGNIFICANT CHANGE UP (ref 135–145)
SODIUM SERPL-SCNC: 138 MMOL/L — SIGNIFICANT CHANGE UP (ref 135–145)
WBC # BLD: 21.48 K/UL — HIGH (ref 3.8–10.5)
WBC # BLD: 21.48 K/UL — HIGH (ref 3.8–10.5)
WBC # FLD AUTO: 21.48 K/UL — HIGH (ref 3.8–10.5)
WBC # FLD AUTO: 21.48 K/UL — HIGH (ref 3.8–10.5)

## 2023-10-28 PROCEDURE — 99233 SBSQ HOSP IP/OBS HIGH 50: CPT | Mod: GC

## 2023-10-28 RX ORDER — INSULIN LISPRO 100/ML
4 VIAL (ML) SUBCUTANEOUS
Refills: 0 | Status: DISCONTINUED | OUTPATIENT
Start: 2023-10-28 | End: 2023-10-31

## 2023-10-28 RX ADMIN — Medication 4 UNIT(S): at 12:56

## 2023-10-28 RX ADMIN — Medication 10 MILLIGRAM(S): at 06:39

## 2023-10-28 RX ADMIN — INSULIN GLARGINE 6 UNIT(S): 100 INJECTION, SOLUTION SUBCUTANEOUS at 21:46

## 2023-10-28 RX ADMIN — AMPICILLIN SODIUM AND SULBACTAM SODIUM 200 GRAM(S): 250; 125 INJECTION, POWDER, FOR SUSPENSION INTRAMUSCULAR; INTRAVENOUS at 17:41

## 2023-10-28 RX ADMIN — Medication 2 UNIT(S): at 09:12

## 2023-10-28 RX ADMIN — ENOXAPARIN SODIUM 40 MILLIGRAM(S): 100 INJECTION SUBCUTANEOUS at 06:39

## 2023-10-28 RX ADMIN — AMPICILLIN SODIUM AND SULBACTAM SODIUM 200 GRAM(S): 250; 125 INJECTION, POWDER, FOR SUSPENSION INTRAMUSCULAR; INTRAVENOUS at 06:39

## 2023-10-28 RX ADMIN — Medication 1: at 12:55

## 2023-10-28 RX ADMIN — AMPICILLIN SODIUM AND SULBACTAM SODIUM 200 GRAM(S): 250; 125 INJECTION, POWDER, FOR SUSPENSION INTRAMUSCULAR; INTRAVENOUS at 12:33

## 2023-10-28 RX ADMIN — Medication 4 UNIT(S): at 17:42

## 2023-10-28 NOTE — PROGRESS NOTE ADULT - ATTENDING COMMENTS
Patient seen and examined, care d/w HS resident.    68M type 2 diabetes, HTN, elevated PSA/bilateral hydrocele, epididymitis who presents with right neck pain, with c/f septic sternoclavicular joint/OM, s/p OR and cx with GBS.     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 Empiric abx for above will cover.     #Thyroid nodule/?epiglottic lesion: CT neck showing thyroid nodule and asymmetric thickening of the right aryepiglottic fold. Appreciate ENT - no concern for mass based on laryngoscopy. Check TFTs. Outpatient thyroid ultrasound.     #T2DM: A1C 7.8%. Strict glycemic control given c/f septic joint   -c/w Lantus 6U qhs  - increase  Admelog 4U TID qac, adjust as needed.    Dc pending ID rec re: duration of tx and CTS, drain in place

## 2023-10-28 NOTE — PROGRESS NOTE ADULT - SUBJECTIVE AND OBJECTIVE BOX
DATE OF SERVICE: 10-28-23 @ 07:09    Patient is a 68y old  Male who presents with a chief complaint of Septic (27 Oct 2023 15:28)      SUBJECTIVE / OVERNIGHT EVENTS:  Overnight, no events.   Pt seen and examined at bedside.    ROS negative except as above.    MEDICATIONS  (STANDING):  ampicillin/sulbactam  IVPB 3 Gram(s) IV Intermittent every 6 hours  dextrose 50% Injectable 25 Gram(s) IV Push once  enalapril 10 milliGRAM(s) Oral daily  enoxaparin Injectable 40 milliGRAM(s) SubCutaneous every 24 hours  influenza  Vaccine (HIGH DOSE) 0.7 milliLiter(s) IntraMuscular once  insulin glargine Injectable (LANTUS) 6 Unit(s) SubCutaneous at bedtime  insulin lispro (ADMELOG) corrective regimen sliding scale   SubCutaneous three times a day before meals  insulin lispro (ADMELOG) corrective regimen sliding scale   SubCutaneous at bedtime  insulin lispro Injectable (ADMELOG) 2 Unit(s) SubCutaneous three times a day before meals    MEDICATIONS  (PRN):  acetaminophen     Tablet .. 975 milliGRAM(s) Oral every 8 hours PRN Mild Pain (1 - 3), Moderate Pain (4 - 6), Severe Pain (7 - 10)  ketorolac   Injectable 15 milliGRAM(s) IV Push every 6 hours PRN Severe Pain (7 - 10)      Vital Signs Last 24 Hrs  T(C): 36.5 (28 Oct 2023 05:53), Max: 37 (27 Oct 2023 10:10)  T(F): 97.7 (28 Oct 2023 05:53), Max: 98.6 (27 Oct 2023 10:10)  HR: 75 (28 Oct 2023 05:53) (75 - 100)  BP: 126/90 (28 Oct 2023 05:53) (109/80 - 137/83)  BP(mean): 83 (27 Oct 2023 10:45) (83 - 95)  RR: 18 (28 Oct 2023 05:53) (12 - 23)  SpO2: 99% (28 Oct 2023 05:53) (95% - 99%)    Parameters below as of 28 Oct 2023 05:53  Patient On (Oxygen Delivery Method): room air      CAPILLARY BLOOD GLUCOSE      POCT Blood Glucose.: 209 mg/dL (27 Oct 2023 21:50)  POCT Blood Glucose.: 229 mg/dL (27 Oct 2023 17:25)  POCT Blood Glucose.: 130 mg/dL (27 Oct 2023 12:30)  POCT Blood Glucose.: 125 mg/dL (27 Oct 2023 10:08)    I&O's Summary    27 Oct 2023 07:01  -  28 Oct 2023 07:00  --------------------------------------------------------  IN: 150 mL / OUT: 45 mL / NET: 105 mL        PHYSICAL EXAM:  GENERAL: NAD, well-developed  HEAD:  Atraumatic, Normocephalic  EYES: EOMI, PERRLA, conjunctiva and sclera clear  NECK: Supple, No JVD  CHEST/LUNG: Clear to auscultation bilaterally; No wheeze  HEART: Regular rate and rhythm; No murmurs, rubs, or gallops  ABDOMEN: Soft, Nontender, Nondistended; Bowel sounds present  EXTREMITIES:  2+ Peripheral Pulses, No clubbing, cyanosis, or edema  PSYCH: AAOx3  NEUROLOGY: non-focal  SKIN: No rashes or lesions    LABS:                        12.8   20.72 )-----------( 390      ( 27 Oct 2023 02:40 )             38.2     10-27    134<L>  |  97<L>  |  19  ----------------------------<  133<H>  3.9   |  25  |  0.99    Ca    9.3      27 Oct 2023 02:40  Phos  2.9     10-27  Mg     2.20     10-27      PT/INR - ( 27 Oct 2023 02:40 )   PT: 13.8 sec;   INR: 1.23 ratio         PTT - ( 27 Oct 2023 02:40 )  PTT:29.9 sec      Urinalysis Basic - ( 27 Oct 2023 02:40 )    Color: x / Appearance: x / SG: x / pH: x  Gluc: 133 mg/dL / Ketone: x  / Bili: x / Urobili: x   Blood: x / Protein: x / Nitrite: x   Leuk Esterase: x / RBC: x / WBC x   Sq Epi: x / Non Sq Epi: x / Bacteria: x        MICRO:    Culture - Acid Fast - Tissue w/Smear (collected 10-27-23 @ 11:18)  Source: .Tissue CLAVICULAR CAPLSULE    Culture - Tissue with Gram Stain (collected 10-27-23 @ 11:18)  Source: .Tissue CLAVICULAR CAPLSULE  Gram Stain (10-27-23 @ 17:55):    No polymorphonuclear cells seen per low power field    No organisms seen per oil power field    Culture - Acid Fast - Tissue w/Smear (collected 10-27-23 @ 11:10)  Source: .Tissue HEAD OF CLAIDE    Culture - Tissue with Gram Stain (collected 10-27-23 @ 11:10)  Source: .Tissue HEAD OF CLAIDE  Gram Stain (10-27-23 @ 15:51):    No polymorphonuclear cells seen per low power field    No organisms seen per oil power field        RADIOLOGY & ADDITIONAL TESTS:      Consultant(s) Notes Reviewed:         DATE OF SERVICE: 10-28-23 @ 07:09    Patient is a 68y old  Male who presents with a chief complaint of Septic (27 Oct 2023 15:28)      SUBJECTIVE / OVERNIGHT EVENTS:  Overnight, no events.   Pt seen and examined at bedside. Patient reports he feels well today, clavicular pain is well controlled. He feels his swelling is mildly improved as well. No fevers or chills. No pain at the drain site.     ROS negative except as above.    MEDICATIONS  (STANDING):  ampicillin/sulbactam  IVPB 3 Gram(s) IV Intermittent every 6 hours  dextrose 50% Injectable 25 Gram(s) IV Push once  enalapril 10 milliGRAM(s) Oral daily  enoxaparin Injectable 40 milliGRAM(s) SubCutaneous every 24 hours  influenza  Vaccine (HIGH DOSE) 0.7 milliLiter(s) IntraMuscular once  insulin glargine Injectable (LANTUS) 6 Unit(s) SubCutaneous at bedtime  insulin lispro (ADMELOG) corrective regimen sliding scale   SubCutaneous three times a day before meals  insulin lispro (ADMELOG) corrective regimen sliding scale   SubCutaneous at bedtime  insulin lispro Injectable (ADMELOG) 2 Unit(s) SubCutaneous three times a day before meals    MEDICATIONS  (PRN):  acetaminophen     Tablet .. 975 milliGRAM(s) Oral every 8 hours PRN Mild Pain (1 - 3), Moderate Pain (4 - 6), Severe Pain (7 - 10)  ketorolac   Injectable 15 milliGRAM(s) IV Push every 6 hours PRN Severe Pain (7 - 10)      Vital Signs Last 24 Hrs  T(C): 36.5 (28 Oct 2023 05:53), Max: 37 (27 Oct 2023 10:10)  T(F): 97.7 (28 Oct 2023 05:53), Max: 98.6 (27 Oct 2023 10:10)  HR: 75 (28 Oct 2023 05:53) (75 - 100)  BP: 126/90 (28 Oct 2023 05:53) (109/80 - 137/83)  BP(mean): 83 (27 Oct 2023 10:45) (83 - 95)  RR: 18 (28 Oct 2023 05:53) (12 - 23)  SpO2: 99% (28 Oct 2023 05:53) (95% - 99%)    Parameters below as of 28 Oct 2023 05:53  Patient On (Oxygen Delivery Method): room air      CAPILLARY BLOOD GLUCOSE      POCT Blood Glucose.: 209 mg/dL (27 Oct 2023 21:50)  POCT Blood Glucose.: 229 mg/dL (27 Oct 2023 17:25)  POCT Blood Glucose.: 130 mg/dL (27 Oct 2023 12:30)  POCT Blood Glucose.: 125 mg/dL (27 Oct 2023 10:08)    I&O's Summary    27 Oct 2023 07:01  -  28 Oct 2023 07:00  --------------------------------------------------------  IN: 150 mL / OUT: 45 mL / NET: 105 mL        PHYSICAL EXAM:  GENERAL: NAD, well-developed  HEAD:  Atraumatic, Normocephalic  EYES: EOMI, PERRLA, conjunctiva and sclera clear  NECK: Supple, No JVD  CHEST/LUNG: R chest wall REYNA drain noted, dressing c/d/i, drain with about 5 cc of bloody output. Surrounding swelling around drain site and mildly tender to palpation, improved from days prior    HEART: Regular rate and rhythm; No murmurs, rubs, or gallops  ABDOMEN: Soft, Nontender, Nondistended; Bowel sounds present  EXTREMITIES:  2+ Peripheral Pulses, No clubbing, cyanosis, or edema  PSYCH: AAOx3  NEUROLOGY: non-focal  SKIN: No rashes or lesions    LABS:                        12.8   20.72 )-----------( 390      ( 27 Oct 2023 02:40 )             38.2     10-27    134<L>  |  97<L>  |  19  ----------------------------<  133<H>  3.9   |  25  |  0.99    Ca    9.3      27 Oct 2023 02:40  Phos  2.9     10-27  Mg     2.20     10-27      PT/INR - ( 27 Oct 2023 02:40 )   PT: 13.8 sec;   INR: 1.23 ratio         PTT - ( 27 Oct 2023 02:40 )  PTT:29.9 sec      Urinalysis Basic - ( 27 Oct 2023 02:40 )    Color: x / Appearance: x / SG: x / pH: x  Gluc: 133 mg/dL / Ketone: x  / Bili: x / Urobili: x   Blood: x / Protein: x / Nitrite: x   Leuk Esterase: x / RBC: x / WBC x   Sq Epi: x / Non Sq Epi: x / Bacteria: x        MICRO:    Culture - Acid Fast - Tissue w/Smear (collected 10-27-23 @ 11:18)  Source: .Tissue CLAVICULAR CAPLSULE    Culture - Tissue with Gram Stain (collected 10-27-23 @ 11:18)  Source: .Tissue CLAVICULAR CAPLSULE  Gram Stain (10-27-23 @ 17:55):    No polymorphonuclear cells seen per low power field    No organisms seen per oil power field    Culture - Acid Fast - Tissue w/Smear (collected 10-27-23 @ 11:10)  Source: .Tissue HEAD OF CLAIDE    Culture - Tissue with Gram Stain (collected 10-27-23 @ 11:10)  Source: .Tissue HEAD OF CLAIDE  Gram Stain (10-27-23 @ 15:51):    No polymorphonuclear cells seen per low power field    No organisms seen per oil power field        RADIOLOGY & ADDITIONAL TESTS:      Consultant(s) Notes Reviewed:         DATE OF SERVICE: 10-28-23 @ 07:09    Patient is a 68y old  Male who presents with a chief complaint of Septic (27 Oct 2023 15:28)    SUBJECTIVE / OVERNIGHT EVENTS:  Overnight, no events.   Pt seen and examined at bedside. Patient reports he feels well today, clavicular pain is well controlled. He feels his swelling is mildly improved as well. No fevers or chills. No pain at the drain site.     ROS negative except as above.    MEDICATIONS  (STANDING):  ampicillin/sulbactam  IVPB 3 Gram(s) IV Intermittent every 6 hours  dextrose 50% Injectable 25 Gram(s) IV Push once  enalapril 10 milliGRAM(s) Oral daily  enoxaparin Injectable 40 milliGRAM(s) SubCutaneous every 24 hours  influenza  Vaccine (HIGH DOSE) 0.7 milliLiter(s) IntraMuscular once  insulin glargine Injectable (LANTUS) 6 Unit(s) SubCutaneous at bedtime  insulin lispro (ADMELOG) corrective regimen sliding scale   SubCutaneous three times a day before meals  insulin lispro (ADMELOG) corrective regimen sliding scale   SubCutaneous at bedtime  insulin lispro Injectable (ADMELOG) 2 Unit(s) SubCutaneous three times a day before meals    MEDICATIONS  (PRN):  acetaminophen     Tablet .. 975 milliGRAM(s) Oral every 8 hours PRN Mild Pain (1 - 3), Moderate Pain (4 - 6), Severe Pain (7 - 10)  ketorolac   Injectable 15 milliGRAM(s) IV Push every 6 hours PRN Severe Pain (7 - 10)      Vital Signs Last 24 Hrs  T(C): 36.5 (28 Oct 2023 05:53), Max: 37 (27 Oct 2023 10:10)  T(F): 97.7 (28 Oct 2023 05:53), Max: 98.6 (27 Oct 2023 10:10)  HR: 75 (28 Oct 2023 05:53) (75 - 100)  BP: 126/90 (28 Oct 2023 05:53) (109/80 - 137/83)  BP(mean): 83 (27 Oct 2023 10:45) (83 - 95)  RR: 18 (28 Oct 2023 05:53) (12 - 23)  SpO2: 99% (28 Oct 2023 05:53) (95% - 99%)    Parameters below as of 28 Oct 2023 05:53  Patient On (Oxygen Delivery Method): room air      CAPILLARY BLOOD GLUCOSE      POCT Blood Glucose.: 209 mg/dL (27 Oct 2023 21:50)  POCT Blood Glucose.: 229 mg/dL (27 Oct 2023 17:25)  POCT Blood Glucose.: 130 mg/dL (27 Oct 2023 12:30)  POCT Blood Glucose.: 125 mg/dL (27 Oct 2023 10:08)    I&O's Summary    27 Oct 2023 07:01  -  28 Oct 2023 07:00  --------------------------------------------------------  IN: 150 mL / OUT: 45 mL / NET: 105 mL        PHYSICAL EXAM:  GENERAL: NAD, well-developed  HEAD:  Atraumatic, Normocephalic  EYES: EOMI, PERRLA, conjunctiva and sclera clear  NECK: Supple, No JVD  CHEST/LUNG: R chest wall REYNA drain noted, dressing c/d/i, drain with about 5 cc of bloody output. Surrounding swelling around drain site and mildly tender to palpation, improved from days prior    HEART: Regular rate and rhythm; No murmurs, rubs, or gallops  ABDOMEN: Soft, Nontender, Nondistended; Bowel sounds present  EXTREMITIES:  2+ Peripheral Pulses, No clubbing, cyanosis, or edema  PSYCH: AAOx3  NEUROLOGY: non-focal  SKIN: No rashes or lesions    LABS:                        12.8   20.72 )-----------( 390      ( 27 Oct 2023 02:40 )             38.2     10-27    134<L>  |  97<L>  |  19  ----------------------------<  133<H>  3.9   |  25  |  0.99    Ca    9.3      27 Oct 2023 02:40  Phos  2.9     10-27  Mg     2.20     10-27      PT/INR - ( 27 Oct 2023 02:40 )   PT: 13.8 sec;   INR: 1.23 ratio         PTT - ( 27 Oct 2023 02:40 )  PTT:29.9 sec      Urinalysis Basic - ( 27 Oct 2023 02:40 )    Color: x / Appearance: x / SG: x / pH: x  Gluc: 133 mg/dL / Ketone: x  / Bili: x / Urobili: x   Blood: x / Protein: x / Nitrite: x   Leuk Esterase: x / RBC: x / WBC x   Sq Epi: x / Non Sq Epi: x / Bacteria: x        MICRO:    Culture - Acid Fast - Tissue w/Smear (collected 10-27-23 @ 11:18)  Source: .Tissue CLAVICULAR CAPLSULE    Culture - Tissue with Gram Stain (collected 10-27-23 @ 11:18)  Source: .Tissue CLAVICULAR CAPLSULE  Gram Stain (10-27-23 @ 17:55):    No polymorphonuclear cells seen per low power field    No organisms seen per oil power field    Culture - Acid Fast - Tissue w/Smear (collected 10-27-23 @ 11:10)  Source: .Tissue HEAD OF CLAIDE    Culture - Tissue with Gram Stain (collected 10-27-23 @ 11:10)  Source: .Tissue HEAD OF CLAIDE  Gram Stain (10-27-23 @ 15:51):    No polymorphonuclear cells seen per low power field    No organisms seen per oil power field        RADIOLOGY & ADDITIONAL TESTS:      Consultant(s) Notes Reviewed:

## 2023-10-28 NOTE — PROGRESS NOTE ADULT - SUBJECTIVE AND OBJECTIVE BOX
ANESTHESIA POSTOP CHECK    68y Male POSTOP DAY 1     No COMPLAINTS    NO APPARENT ANESTHESIA COMPLICATIONS

## 2023-10-28 NOTE — PROGRESS NOTE ADULT - PROBLEM SELECTOR PLAN 3
CT Neck/Soft tissue noted Asymmetric thickening of the R aryepiglottic fold; possible mass  Denies any respiratory distress  Airway appears patent; no obvious stridorous breathing  o2 sats stable;     Plan:  Consult ENT for direct visualization for further evaluation Home: Enalapril 10 qd    - C/W Enalapril 10 qd

## 2023-10-28 NOTE — PROGRESS NOTE ADULT - PROBLEM SELECTOR PLAN 4
Patient on Jardiance 25 qd and Metformin 1000 BID    Plan  LSS Diet: DASH diet  DVT ppx: HSQ  Code Status: Full Code  Dispo: pending

## 2023-10-28 NOTE — PROGRESS NOTE ADULT - PROBLEM SELECTOR PLAN 2
Patient endorses 3 day history of dysuria; with hx of epididymitis    Outpatient U/A and UCx Positive for GBS    Plan:   As above in Septic Joint  Will repeat U/A UCx Patient on Jardiance 25 qd and Metformin 1000 BID    - ISS

## 2023-10-28 NOTE — CHART NOTE - NSCHARTNOTEFT_GEN_A_CORE
Thoracic Surgery Note    68y    Male    Procedure:    Diagnosis/Indication: Patient is a 68y old  Male who presents with a chief complaint of Septic (28 Oct 2023 11:29)    Thoracic Attending Physician: Dr. Loomis    Subjective: Pt seen with covering attending Dr. Macario. Pt endorsing mild pain and discomfort with mobility of shoulder, pain improving. Pt wearing sling. Pt did not endorse chest pain, SOB, fevers/chills, N/V.     PAST MEDICAL & SURGICAL HISTORY:  DM (diabetes mellitus)      Hypertension      Hydrocele           CBC Full  -  ( 28 Oct 2023 06:08 )  WBC Count : 21.48 K/uL  RBC Count : 4.20 M/uL  Hemoglobin : 12.1 g/dL  Hematocrit : 36.6 %  Platelet Count - Automated : 425 K/uL  Mean Cell Volume : 87.1 fL  Mean Cell Hemoglobin : 28.8 pg  Mean Cell Hemoglobin Concentration : 33.1 gm/dL  Auto Neutrophil # : x  Auto Lymphocyte # : x  Auto Monocyte # : x  Auto Eosinophil # : x  Auto Basophil # : x  Auto Neutrophil % : x  Auto Lymphocyte % : x  Auto Monocyte % : x  Auto Eosinophil % : x  Auto Basophil % : x    10-28    138  |  99  |  26<H>  ----------------------------<  137<H>  4.2   |  27  |  1.04    Ca    9.4      28 Oct 2023 06:08  Phos  2.5     10-28  Mg     2.20     10-28      PT/INR - ( 27 Oct 2023 02:40 )   PT: 13.8 sec;   INR: 1.23 ratio         PTT - ( 27 Oct 2023 02:40 )  PTT:29.9 sec    A) 68M with hx of T2DM, HTN, elevated PSA/bilateral hydrocele, epididymitis who presents with right neck pain, with c/f septic sternoclavicular joint/OM. CT neck/chest: Asymmetric widening of the right sternoclavicular joint with fluid in the joint and cortical irregularities involving the sternoclavicular joint and cartilage of the first rib for which thoracic surgery was consulted. Patient s/p right sternoclavicular joint resection, muscle flap on 10/27.    Plan  - Continue sling  - Pain control  - Rest of care per primary team

## 2023-10-28 NOTE — PROGRESS NOTE ADULT - PROBLEM SELECTOR PLAN 1
CT Chest showing concern for osteomyelitis vs Septic joint of R Sternoclavicular joint. Patient Afebrile, with only mild subclinical fever, although 50-60% of septic arthritis can present afebrile. Patient with improved ROM with pain medication, which is a bit atypical for a septic joint. Concern for radiologic evidence of Septic Arthritis with possible osteo    -Elevated ESR and CRP  -Patient with U/A positive for GBS; can consider as source of infection  -Discussed with Orthopedic surgery; will need CT Surgery for management    Plan:  Vanc/Zosyn 10/25 - 10/26. Unasyn (10-26 - )  BCx 10/23 - NGTD (outpatient in [facility])  BCx 10/25 - NGTD   Urine culture outpt growing strep anginosa pending succeptibility  U/A negative  Throat culture  strep anginosus   F/U Thoracic Surgery Recs  MR Shoulder Joint for further evaluation of potential osteo CT Chest showing concern for osteomyelitis vs Septic joint of R Sternoclavicular joint. Patient Afebrile, with only mild subclinical fever, although 50-60% of septic arthritis can present afebrile.   Most likely source is strep anginosus as isolated from 10/23 throat cx vs. GBS in urine (less likely)   S/p OR debridement and drainage on 10/27 with thoracic surgery, REYNA drain left in place     - s/p Vanc/Zosyn 10/25 - 10/26. c/w Unasyn (10-26 - ). will likely need PICC for prolonged abx   - MR Shoulder Joint for further evaluation of potential osteo  - monitor REYNA drain output; c/w bulb suction  - f/u Bcx and tissue cx from OR; f/u throat cx sensitivities

## 2023-10-28 NOTE — PROGRESS NOTE ADULT - PROBLEM SELECTOR PLAN 6
Diet: DASH diet  Psych/Sleep: None JEOVANNY  GI: None JEOVANNY  DVT ppx: HSQ  Code Status: Full Code  Dispo:
Diet: DASH diet  Psych/Sleep: None JEOVANNY  GI: None JEOVANNY  DVT ppx: HSQ  Code Status: Full Code  Dispo:
Diet: DASH diet  DVT ppx: HSQ  Code Status: Full Code  Dispo: pending

## 2023-10-29 ENCOUNTER — TRANSCRIPTION ENCOUNTER (OUTPATIENT)
Age: 68
End: 2023-10-29

## 2023-10-29 ENCOUNTER — NON-APPOINTMENT (OUTPATIENT)
Age: 68
End: 2023-10-29

## 2023-10-29 LAB
ANION GAP SERPL CALC-SCNC: 9 MMOL/L — SIGNIFICANT CHANGE UP (ref 7–14)
ANION GAP SERPL CALC-SCNC: 9 MMOL/L — SIGNIFICANT CHANGE UP (ref 7–14)
BUN SERPL-MCNC: 24 MG/DL — HIGH (ref 7–23)
BUN SERPL-MCNC: 24 MG/DL — HIGH (ref 7–23)
CALCIUM SERPL-MCNC: 9.1 MG/DL — SIGNIFICANT CHANGE UP (ref 8.4–10.5)
CALCIUM SERPL-MCNC: 9.1 MG/DL — SIGNIFICANT CHANGE UP (ref 8.4–10.5)
CHLORIDE SERPL-SCNC: 101 MMOL/L — SIGNIFICANT CHANGE UP (ref 98–107)
CHLORIDE SERPL-SCNC: 101 MMOL/L — SIGNIFICANT CHANGE UP (ref 98–107)
CO2 SERPL-SCNC: 27 MMOL/L — SIGNIFICANT CHANGE UP (ref 22–31)
CO2 SERPL-SCNC: 27 MMOL/L — SIGNIFICANT CHANGE UP (ref 22–31)
CREAT SERPL-MCNC: 1.06 MG/DL — SIGNIFICANT CHANGE UP (ref 0.5–1.3)
CREAT SERPL-MCNC: 1.06 MG/DL — SIGNIFICANT CHANGE UP (ref 0.5–1.3)
CULTURE RESULTS: ABNORMAL
CULTURE RESULTS: ABNORMAL
EGFR: 77 ML/MIN/1.73M2 — SIGNIFICANT CHANGE UP
EGFR: 77 ML/MIN/1.73M2 — SIGNIFICANT CHANGE UP
GLUCOSE BLDC GLUCOMTR-MCNC: 107 MG/DL — HIGH (ref 70–99)
GLUCOSE BLDC GLUCOMTR-MCNC: 107 MG/DL — HIGH (ref 70–99)
GLUCOSE BLDC GLUCOMTR-MCNC: 121 MG/DL — HIGH (ref 70–99)
GLUCOSE BLDC GLUCOMTR-MCNC: 121 MG/DL — HIGH (ref 70–99)
GLUCOSE BLDC GLUCOMTR-MCNC: 216 MG/DL — HIGH (ref 70–99)
GLUCOSE BLDC GLUCOMTR-MCNC: 216 MG/DL — HIGH (ref 70–99)
GLUCOSE BLDC GLUCOMTR-MCNC: 88 MG/DL — SIGNIFICANT CHANGE UP (ref 70–99)
GLUCOSE BLDC GLUCOMTR-MCNC: 88 MG/DL — SIGNIFICANT CHANGE UP (ref 70–99)
GLUCOSE SERPL-MCNC: 179 MG/DL — HIGH (ref 70–99)
GLUCOSE SERPL-MCNC: 179 MG/DL — HIGH (ref 70–99)
HCT VFR BLD CALC: 36.5 % — LOW (ref 39–50)
HCT VFR BLD CALC: 36.5 % — LOW (ref 39–50)
HGB BLD-MCNC: 12.1 G/DL — LOW (ref 13–17)
HGB BLD-MCNC: 12.1 G/DL — LOW (ref 13–17)
MAGNESIUM SERPL-MCNC: 2.1 MG/DL — SIGNIFICANT CHANGE UP (ref 1.6–2.6)
MAGNESIUM SERPL-MCNC: 2.1 MG/DL — SIGNIFICANT CHANGE UP (ref 1.6–2.6)
MCHC RBC-ENTMCNC: 29.2 PG — SIGNIFICANT CHANGE UP (ref 27–34)
MCHC RBC-ENTMCNC: 29.2 PG — SIGNIFICANT CHANGE UP (ref 27–34)
MCHC RBC-ENTMCNC: 33.2 GM/DL — SIGNIFICANT CHANGE UP (ref 32–36)
MCHC RBC-ENTMCNC: 33.2 GM/DL — SIGNIFICANT CHANGE UP (ref 32–36)
MCV RBC AUTO: 88.2 FL — SIGNIFICANT CHANGE UP (ref 80–100)
MCV RBC AUTO: 88.2 FL — SIGNIFICANT CHANGE UP (ref 80–100)
NRBC # BLD: 0 /100 WBCS — SIGNIFICANT CHANGE UP (ref 0–0)
NRBC # BLD: 0 /100 WBCS — SIGNIFICANT CHANGE UP (ref 0–0)
NRBC # FLD: 0 K/UL — SIGNIFICANT CHANGE UP (ref 0–0)
NRBC # FLD: 0 K/UL — SIGNIFICANT CHANGE UP (ref 0–0)
PHOSPHATE SERPL-MCNC: 2.2 MG/DL — LOW (ref 2.5–4.5)
PHOSPHATE SERPL-MCNC: 2.2 MG/DL — LOW (ref 2.5–4.5)
PLATELET # BLD AUTO: 448 K/UL — HIGH (ref 150–400)
PLATELET # BLD AUTO: 448 K/UL — HIGH (ref 150–400)
POTASSIUM SERPL-MCNC: 4.1 MMOL/L — SIGNIFICANT CHANGE UP (ref 3.5–5.3)
POTASSIUM SERPL-MCNC: 4.1 MMOL/L — SIGNIFICANT CHANGE UP (ref 3.5–5.3)
POTASSIUM SERPL-SCNC: 4.1 MMOL/L — SIGNIFICANT CHANGE UP (ref 3.5–5.3)
POTASSIUM SERPL-SCNC: 4.1 MMOL/L — SIGNIFICANT CHANGE UP (ref 3.5–5.3)
RBC # BLD: 4.14 M/UL — LOW (ref 4.2–5.8)
RBC # BLD: 4.14 M/UL — LOW (ref 4.2–5.8)
RBC # FLD: 14.3 % — SIGNIFICANT CHANGE UP (ref 10.3–14.5)
RBC # FLD: 14.3 % — SIGNIFICANT CHANGE UP (ref 10.3–14.5)
SODIUM SERPL-SCNC: 137 MMOL/L — SIGNIFICANT CHANGE UP (ref 135–145)
SODIUM SERPL-SCNC: 137 MMOL/L — SIGNIFICANT CHANGE UP (ref 135–145)
SPECIMEN SOURCE: SIGNIFICANT CHANGE UP
SPECIMEN SOURCE: SIGNIFICANT CHANGE UP
WBC # BLD: 15.11 K/UL — HIGH (ref 3.8–10.5)
WBC # BLD: 15.11 K/UL — HIGH (ref 3.8–10.5)
WBC # FLD AUTO: 15.11 K/UL — HIGH (ref 3.8–10.5)
WBC # FLD AUTO: 15.11 K/UL — HIGH (ref 3.8–10.5)

## 2023-10-29 PROCEDURE — 99233 SBSQ HOSP IP/OBS HIGH 50: CPT | Mod: GC

## 2023-10-29 RX ADMIN — AMPICILLIN SODIUM AND SULBACTAM SODIUM 200 GRAM(S): 250; 125 INJECTION, POWDER, FOR SUSPENSION INTRAMUSCULAR; INTRAVENOUS at 05:48

## 2023-10-29 RX ADMIN — Medication 10 MILLIGRAM(S): at 05:50

## 2023-10-29 RX ADMIN — AMPICILLIN SODIUM AND SULBACTAM SODIUM 200 GRAM(S): 250; 125 INJECTION, POWDER, FOR SUSPENSION INTRAMUSCULAR; INTRAVENOUS at 18:40

## 2023-10-29 RX ADMIN — AMPICILLIN SODIUM AND SULBACTAM SODIUM 200 GRAM(S): 250; 125 INJECTION, POWDER, FOR SUSPENSION INTRAMUSCULAR; INTRAVENOUS at 13:15

## 2023-10-29 RX ADMIN — Medication 4 UNIT(S): at 18:01

## 2023-10-29 RX ADMIN — Medication 4 UNIT(S): at 09:01

## 2023-10-29 RX ADMIN — ENOXAPARIN SODIUM 40 MILLIGRAM(S): 100 INJECTION SUBCUTANEOUS at 05:48

## 2023-10-29 RX ADMIN — INSULIN GLARGINE 6 UNIT(S): 100 INJECTION, SOLUTION SUBCUTANEOUS at 22:10

## 2023-10-29 RX ADMIN — AMPICILLIN SODIUM AND SULBACTAM SODIUM 200 GRAM(S): 250; 125 INJECTION, POWDER, FOR SUSPENSION INTRAMUSCULAR; INTRAVENOUS at 23:59

## 2023-10-29 RX ADMIN — Medication 4 UNIT(S): at 13:16

## 2023-10-29 RX ADMIN — AMPICILLIN SODIUM AND SULBACTAM SODIUM 200 GRAM(S): 250; 125 INJECTION, POWDER, FOR SUSPENSION INTRAMUSCULAR; INTRAVENOUS at 00:15

## 2023-10-29 NOTE — DISCHARGE NOTE PROVIDER - NSDCCPCAREPLAN_GEN_ALL_CORE_FT
PRINCIPAL DISCHARGE DIAGNOSIS  Diagnosis: Septic joint  Assessment and Plan of Treatment: You were admitted to the hospital on October 26th because of a severe shoulder pain and concern of an infection in your shoulder joint, possibly related to a throat infection.  During your time in the hospital, you received antibiotics to fight the infection and underwent a procedure where the doctors cleaned out the infected area in your shoulder and removed the infected joint. This procedure helped remove infected material and improve your symptoms. Infectious disease (ID) doctors were involved to choose the best antibiotics for your condition.  You responded well to the treatments. The type of antibiotics you were on was adjusted based on the specific infection found. You are stable now and ready to leave the hospital.   TO DO:  - See your primary care doctor within 1 to 2 weeks.  - Have a follow-up regarding your shoulder with the cardiothroacic surgical team Dr. Loomis in 1-2 weeks   - Continue taking your new antibiotics as prescribed through the IV line. A nurse will visit you at your home and will teach you how to use the antibiotics. PLEASE TAKE THE ANTIBIOTICS FOR 6 WEEKS UNTIL 12/8/31.   - Please make sure to get your blood drawn weekly while on antibiotics which will be done with the nurse  Return to the hospital if you notice increased pain or redness in your shoulder, have a fever, or any new or concerning symptoms. Remember, it’s essential to keep all your follow-up appointments and take your medications as directed for a full recovery.      SECONDARY DISCHARGE DIAGNOSES  Diagnosis: Thyroid nodule  Assessment and Plan of Treatment: While in the hospital you were noted to have a thyroid nodule on imaging. The Ear Nose and Throat doctors evaluated you and felt it was not a mass that is concerning.  They recommended outpatient follow up with your PCP to do an ultrasound of the thyroid to assess for any concerning findings.  TO DO  - Please follow up with your PCP within 1-2 weeks to check your thyroid with an US     PRINCIPAL DISCHARGE DIAGNOSIS  Diagnosis: Septic joint  Assessment and Plan of Treatment: You were admitted to the hospital on October 26th because of a severe shoulder pain and concern of an infection in your shoulder joint, possibly related to a throat infection.  During your time in the hospital, you received antibiotics to fight the infection and underwent a procedure where the doctors cleaned out the infected area in your shoulder and removed the infected joint. This procedure helped remove infected material and improve your symptoms. Infectious disease (ID) doctors were involved to choose the best antibiotics for your condition.  You responded well to the treatments. The type of antibiotics you were on was adjusted based on the specific infection found. You are stable now and ready to leave the hospital.   TO DO:  - See your primary care doctor within 1 to 2 weeks.  - Have a follow-up regarding your shoulder with the cardiothroacic surgical team Dr. Loomis in 1-2 weeks   - Continue taking your new antibiotics as prescribed through the IV line. A nurse will visit you at your home and will teach you how to use the antibiotics. PLEASE TAKE THE ANTIBIOTICS FOR 6 WEEKS UNTIL 12/8/31.   - Please make sure to get your blood drawn weekly while on antibiotics which will be done with the nurse  Return to the hospital if you notice increased pain or redness in your shoulder, have a fever, or any new or concerning symptoms. Remember, it’s essential to keep all your follow-up appointments and take your medications as directed for a full recovery.      SECONDARY DISCHARGE DIAGNOSES  Diagnosis: Thyroid nodule  Assessment and Plan of Treatment: While in the hospital you were noted to have a thyroid nodule on imaging. The Ear Nose and Throat doctors evaluated you and felt it was not a mass that is concerning.  They recommended outpatient follow up with your PCP to do an ultrasound of the thyroid to assess for any concerning findings.  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 This is something that may occur over time but is best if is monitored by your PCP  TO DO  - please follow up with your PCP to monitor your aorta changes and if any additional management is needed     PRINCIPAL DISCHARGE DIAGNOSIS  Diagnosis: Septic joint  Assessment and Plan of Treatment: You were admitted to the hospital on October 26th because of a severe shoulder pain and concern of an infection in your shoulder joint, possibly related to a throat infection.  During your time in the hospital, you received antibiotics to fight the infection and underwent a procedure where the doctors cleaned out the infected area in your shoulder and removed the infected joint. This procedure helped remove infected material and improve your symptoms. Infectious disease (ID) doctors were involved to choose the best antibiotics for your condition.  You responded well to the treatments. The type of antibiotics you were on was adjusted based on the specific infection found. You are stable now and ready to leave the hospital.   TO DO:  - See your primary care doctor and your ID doctors within 1 to 2 weeks.  - Have a follow-up regarding your shoulder with the cardiothroacic surgical team Dr. Loomis in 1-2 weeks   - Continue taking your new antibiotics as prescribed through the IV line. A nurse will visit you at your home and will teach you how to use the antibiotics. PLEASE TAKE THE ANTIBIOTICS FOR 6 WEEKS UNTIL 12/8/23.   - Please make sure to get your blood drawn weekly while on antibiotics which will be done with the nurse  Return to the hospital if you notice increased pain or redness in your shoulder, have a fever, or any new or concerning symptoms. Remember, it’s essential to keep all your follow-up appointments and take your medications as directed for a full recovery.      SECONDARY DISCHARGE DIAGNOSES  Diagnosis: Thyroid nodule  Assessment and Plan of Treatment: While in the hospital you were noted to have a thyroid nodule on imaging. The Ear Nose and Throat doctors evaluated you and felt it was not a mass that is concerning.  They recommended outpatient follow up with your PCP to do an ultrasound of the thyroid to assess for any concerning findings.  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 This is something that may occur over time but is best if is monitored by your PCP  TO DO  - please follow up with your PCP to monitor your aorta changes and if any additional management is needed

## 2023-10-29 NOTE — DISCHARGE NOTE PROVIDER - PROVIDER TOKENS
PROVIDER:[TOKEN:[[id number]]],PROVIDER:[TOKEN:[[id number]]] PROVIDER:[TOKEN:[[id number]]],PROVIDER:[TOKEN:[[id number]]],PROVIDER:[TOKEN:[[id number]]]

## 2023-10-29 NOTE — PHYSICAL THERAPY INITIAL EVALUATION ADULT - PERTINENT HX OF CURRENT PROBLEM, REHAB EVAL
Pt is a 68 year old male with a past medical history of HTN T2DM, HLD, Elevated PSA/Bilateral Hydrocele s/p epididymitis treatment presents with concern for septic arthritis iso radiographic evidence and severe shoulder pain with possible source being GBS UTI; now started on IV Abx, s/p debridement and joint irrigation per thoracic surgery (10/27).

## 2023-10-29 NOTE — DISCHARGE NOTE PROVIDER - HOSPITAL COURSE
Discharge Summary     Admit Date: 10-26-23    HPI:  68M w/HTN T2DM, HLD, Elevated PSA/Bilateral Hydrocele s/p epididymitis treatment presents with R sided chest swelling/neck pain since monday,      Patient reports that the R sided pain and swelling seemed to start on Monday and he went to his PCP, Dr. Narvaez for R sided neck discomfort and shoulder pain, with blood cultures drawn on 10/23 showing NGTD, ESR elevated to 63, and Urine culture positive for GBS. He reports never having this kind of pain before except in his left shoulder which was being treated for bursitis, He says there was pain that shot from his L shoulder to his Right and ever since then he was in pain. He reported it was mild and manageable at first, but was refractory to meloxicam and tylenol. On Wednesday, he had a scheduled CT of his pelvis and reports soon before his CT scan, his pain intensified severely to 10/10 and right after the scan, he went to [facility]. He reports pain is constant, and 14/10; worsens with movement of his shoulder and his neck. Also endorses some burning across his shoulder. He endorses some burning with urination for the past 3 days as well. He has never had anything severe like this before. Denies any recent trauma, any recent URI symptoms, any history of shoulder surgeries.     ED Course: 97.9, 141/88, 101, 18/min 100% RA  Patient was started on Vanc/Zosyn, Morphine 4mg IV x1,   CT A/P Showed signs concerning for potential sternoclavicular joint septic arthritis  Hypodense thyroid nodules as well.    (26 Oct 2023 04:09)      On day of discharge, patient is clinically stable with no new exam findings or acute symptoms compared to prior. The patient was seen by the attending physician on the date of discharge and deemed stable and acceptable for discharge.     Discharge follow up action items:     1. Follow up with PCP in 1-2 weeks.   2. Follow up labs, path, & imaging ***  3. Medication changes ***  4. On hold medications ***    Patient's ordered code status: ***    Patient disposition: ***     Patient reports that the R sided pain and swelling seemed to start on Monday and he went to his PCP, Dr. Narvaez for R sided neck discomfort and shoulder pain, with blood cultures drawn on 10/23 showing NGTD, ESR elevated to 63, and Urine culture positive for GBS. He reports never having this kind of pain before except in his left shoulder which was being treated for bursitis, He says there was pain that shot from his L shoulder to his Right and ever since then he was in pain. He reported it was mild and manageable at first, but was refractory to meloxicam and tylenol. On Wednesday, he had a scheduled CT of his pelvis and reports soon before his CT scan, his pain intensified severely to 10/10 and right after the scan, he went to [facility]. He reports pain is constant, and 14/10; worsens with movement of his shoulder and his neck. Also endorses some burning across his shoulder. He endorses some burning with urination for the past 3 days as well. He has never had anything severe like this before. Denies any recent trauma, any recent URI symptoms, any history of shoulder surgeries.     He underwent a CT Chest and Neck Soft Tissue revealing findings are overall suspicious for septic sternoclavicular joint and possible underlying osteomyelitis with associated spread of infection into the lower neck and posterior to the clavicle. Patient's hospital course involved an evaluation by thoracic surgery. He was scheduled to the OR for a R SC joint irrigation and debridgement w/ Dr. Loomis. He underwetn a open resection of right sternoclavicular joint with muscle advancement flap. A REYNA drain was placed which was drained on a daily basis with blood and clots. A sling was given for limited motion and stability. He was evaluated by infectious disease and had an infectious workup with findings of strep anginosis on throat cultures along with group B strep. He was started on Unasyn 3g q6hr and was transitioned to ceftriaxone along with insertion of a PICC  to discharge. He will continue antibiotics until 12/8 with weekly blood work to monitor. This will  be a 6-week course following his operation. On day of discharge, patient is clinically stable with no new exam findings or acute symptoms compared to prior. The patient was seen by the attending physician on the date of discharge and deemed stable and acceptable for discharge.      Patient reports that the R sided pain and swelling seemed to start on Monday and he went to his PCP, Dr. Narvaez for R sided neck discomfort and shoulder pain, with blood cultures drawn on 10/23 showing NGTD, ESR elevated to 63, and Urine culture positive for GBS. He reports never having this kind of pain before except in his left shoulder which was being treated for bursitis, He says there was pain that shot from his L shoulder to his Right and ever since then he was in pain. He reported it was mild and manageable at first, but was refractory to meloxicam and tylenol. On Wednesday, he had a scheduled CT of his pelvis and reports soon before his CT scan, his pain intensified severely to 10/10 and right after the scan, he went to [facility]. He reports pain is constant, and 14/10; worsens with movement of his shoulder and his neck. Also endorses some burning across his shoulder. He endorses some burning with urination for the past 3 days as well. He has never had anything severe like this before. Denies any recent trauma, any recent URI symptoms, any history of shoulder surgeries.     He underwent a CT Chest and Neck Soft Tissue revealing findings are overall suspicious for septic sternoclavicular joint and possible underlying osteomyelitis with associated spread of infection into the lower neck and posterior to the clavicle. Patient's hospital course involved an evaluation by thoracic surgery. He was scheduled to the OR for a R SC joint resection and muscle flap w/ Dr. Loomis. A REYNA drain was placed which was drained on a daily basis with blood and clots. A sling was given for limited motion and stability. He was evaluated by infectious disease and had an infectious workup with findings of strep anginosis on throat cultures along with group B strep. He was started on Unasyn 3g q6hr and was transitioned to ceftriaxone along with insertion of a PICC  to discharge. He will continue antibiotics until 12/8 with weekly blood work to monitor. This will  be a 6-week course following his operation. On day of discharge, patient is clinically stable with no new exam findings or acute symptoms compared to prior. The patient was seen by the attending physician on the date of discharge and deemed stable and acceptable for discharge.     Of note, patient was also found to have a thyroid nodule on CT imaging. ENT evaluated the patient and felt finding was not a mass and patient would warrant outpatient thyroid US.  An Echo was also done while inpatient and showed a dilated aorta to 4.1cm.     Follow up  - Patient requires follow up with PCP regarding PICC line antibiotic treatment and weekly lab measurements.   - Patient will follow up with Cardiothoracic surgery within 1 week of discharge to assess REYNA drain and surgical site  - Patient requires follow up with PCP to assess thyroid nodule with outpatient thyroid US  - Patient requires further evaluation for dilated Ascending Aorta to 4.1 cm  - Patient will require outpatient OT therapy to help with recovery    Medication changes:  - started on CTX 2g qd for 6 weeks until 12/8. Will need weekly labs to assess response to antibiotics       Patient reports that the R sided pain and swelling seemed to start on Monday and he went to his PCP, Dr. Narvaez for R sided neck discomfort and shoulder pain, with blood cultures drawn on 10/23 showing NGTD, ESR elevated to 63, and Urine culture positive for GBS. He reports never having this kind of pain before except in his left shoulder which was being treated for bursitis, He says there was pain that shot from his L shoulder to his Right and ever since then he was in pain. He reported it was mild and manageable at first, but was refractory to meloxicam and tylenol. On Wednesday, he had a scheduled CT of his pelvis and reports soon before his CT scan, his pain intensified severely to 10/10 and right after the scan, he went to [facility]. He reports pain is constant, and 14/10; worsens with movement of his shoulder and his neck. Also endorses some burning across his shoulder. He endorses some burning with urination for the past 3 days as well. He has never had anything severe like this before. Denies any recent trauma, any recent URI symptoms, any history of shoulder surgeries.     He underwent a CT Chest and Neck Soft Tissue revealing findings are overall suspicious for septic sternoclavicular joint and possible underlying osteomyelitis with associated spread of infection into the lower neck and posterior to the clavicle. Patient's hospital course involved an evaluation by thoracic surgery. He was scheduled to the OR for a R SC joint resection and muscle flap w/ Dr. Loomis. A REYNA drain was placed which was drained on a daily basis with blood and clots. A sling was given for limited motion and stability. He was evaluated by infectious disease and had an infectious workup with findings of strep anginosis on throat cultures along with group B strep. He was started on Unasyn 3g q6hr and was transitioned to ceftriaxone along with insertion of a PICC  to discharge. He will continue antibiotics until 12/8 with weekly blood work to monitor. This will  be a 6-week course following his operation. On day of discharge, patient is clinically stable with no new exam findings or acute symptoms compared to prior. The patient was seen by the attending physician on the date of discharge and deemed stable and acceptable for discharge.     Of note, patient was also found to have a thyroid nodule on CT imaging. ENT evaluated the patient and felt finding was not a mass and patient would warrant outpatient thyroid US.  An Echo was also done while inpatient and showed a dilated aorta to 4.1cm.     Follow up  - Patient requires follow up with PCP regarding PICC line antibiotic treatment and weekly lab measurements.   - Patient will follow up with Cardiothoracic surgery within 1 week of discharge to assess REYNA drain and surgical site  - Patient requires follow up with PCP to assess thyroid nodule with outpatient thyroid US  - Patient requires further evaluation for dilated Ascending Aorta to 4.1 cm  - Patient will require outpatient OT therapy to help with recovery    Medication changes:  - started on CTX 2g qd for 6 weeks until 12/8. Will need weekly labs to assess response to antibiotics       Patient reports that the R sided pain and swelling seemed to start on Monday and he went to his PCP, Dr. Narvaez for R sided neck discomfort and shoulder pain, with blood cultures drawn on 10/23 showing NGTD, ESR elevated to 63, and Urine culture positive for GBS. He reports never having this kind of pain before except in his left shoulder which was being treated for bursitis, He says there was pain that shot from his L shoulder to his Right and ever since then he was in pain. He reported it was mild and manageable at first, but was refractory to meloxicam and tylenol. On Wednesday, he had a scheduled CT of his pelvis and reports soon before his CT scan, his pain intensified severely to 10/10 and right after the scan, he went to [facility]. He reports pain is constant, and 14/10; worsens with movement of his shoulder and his neck. Also endorses some burning across his shoulder. He endorses some burning with urination for the past 3 days as well. He has never had anything severe like this before. Denies any recent trauma, any recent URI symptoms, any history of shoulder surgeries.     He underwent a CT Chest and Neck Soft Tissue revealing findings are overall suspicious for septic sternoclavicular joint and possible underlying osteomyelitis with associated spread of infection into the lower neck and posterior to the clavicle. Patient's hospital course involved an evaluation by thoracic surgery. He was scheduled to the OR for a R SC joint resection and muscle flap w/ Dr. Loomis. A REYNA drain was placed which was drained on a daily basis with blood and clots. A sling was given for limited motion and stability. He was evaluated by infectious disease and had an infectious workup with findings of strep anginosis on throat cultures along with group B strep. He was started on Unasyn 3g q6hr and was transitioned to ceftriaxone along with insertion of a PICC  to discharge. He will continue antibiotics until 12/8 with weekly blood work to monitor. This will  be a 6-week course following his operation. On day of discharge, patient is clinically stable with no new exam findings or acute symptoms compared to prior. The patient was seen by the attending physician on the date of discharge and deemed stable and acceptable for discharge.     Of note, patient was also found to have a thyroid nodule on CT imaging. ENT evaluated the patient and felt finding was not a mass and patient would warrant outpatient thyroid US.  An Echo was also done while inpatient and showed a dilated aorta to 4.1cm.     Follow up  - Patient requires follow up with PCP regarding PICC line antibiotic treatment and weekly lab measurements.   - Patient will follow up with Cardiothoracic surgery within 1 week of discharge to assess REYNA drain and surgical site including staples  - Patient requires follow up with PCP to assess thyroid nodule with outpatient thyroid US  - Patient requires follow up with ID regarding weekly lab measurements and antibiotic management  - Patient requires further evaluation for dilated Ascending Aorta to 4.1 cm  - Patient will require outpatient OT therapy to help with recovery    Medication changes:  - started on CTX 2g qd for 6 weeks until 12/8. Will need weekly labs to assess response to antibiotics       Patient reports that the R sided pain and swelling seemed to start on Monday and he went to his PCP, Dr. Narvaez for R sided neck discomfort and shoulder pain, with blood cultures drawn on 10/23 showing NGTD, ESR elevated to 63, and Urine culture positive for GBS. He reports never having this kind of pain before except in his left shoulder which was being treated for bursitis, He says there was pain that shot from his L shoulder to his Right and ever since then he was in pain. He reported it was mild and manageable at first, but was refractory to meloxicam and tylenol. On Wednesday, he had a scheduled CT of his pelvis and reports soon before his CT scan, his pain intensified severely to 10/10 and right after the scan, he went to [facility]. He reports pain is constant, and 14/10; worsens with movement of his shoulder and his neck. Also endorses some burning across his shoulder. He endorses some burning with urination for the past 3 days as well. He has never had anything severe like this before. Denies any recent trauma, any recent URI symptoms, any history of shoulder surgeries.     He underwent a CT Chest and Neck Soft Tissue revealing findings are overall suspicious for septic sternoclavicular joint and possible underlying osteomyelitis with associated spread of infection into the lower neck and posterior to the clavicle. Patient's hospital course involved an evaluation by thoracic surgery. He was scheduled to the OR for a R SC joint resection and muscle flap w/ Dr. Loomis. A REYNA drain was placed which was drained on a daily basis with blood and clots. A sling was given for limited motion and stability. He was evaluated by infectious disease and had an infectious workup with findings of strep anginosis on throat cultures along with group B strep. He was started on Unasyn 3g q6hr and was transitioned to ceftriaxone along with insertion of a PICC  to discharge. He will continue antibiotics until 12/8 with weekly blood work to monitor. This will  be a 6-week course following his operation. On day of discharge, patient is clinically stable with no new exam findings or acute symptoms compared to prior. The patient was seen by the attending physician on the date of discharge and deemed stable and acceptable for discharge.     Of note, patient was also found to have a thyroid nodule on CT imaging. ENT evaluated the patient and felt finding was not a mass and patient would warrant outpatient thyroid US.  An Echo was also done while inpatient and showed a dilated aorta to 4.1cm.     Follow up  - Patient requires follow up with PCP regarding PICC line antibiotic treatment and weekly lab measurements. Will email Dr. Narvaez summary regarding hospital visit  - Patient will follow up with Cardiothoracic surgery within 1 week of discharge to assess REYNA drain and surgical site including staples  - Patient requires follow up with PCP to assess thyroid nodule with outpatient thyroid US  - Patient requires follow up with ID regarding weekly lab measurements and antibiotic management  - Patient requires further evaluation for dilated Ascending Aorta to 4.1 cm  - Patient will require outpatient OT therapy to help with recovery    Medication changes:  - started on CTX 2g qd for 6 weeks until 12/8. Will need weekly labs to assess response to antibiotics

## 2023-10-29 NOTE — PHYSICAL THERAPY INITIAL EVALUATION ADULT - ADDITIONAL COMMENTS
Pt lives with his wife in a house with 6 steps to enter. Pt did not use an assistive device and was independent with ADLs prior. Pt reports no falls in the past 6 months.   Pt left sitting at edge of the bed in NAD, sling intact, call rodriges in reach and SARAI Diaz made aware.

## 2023-10-29 NOTE — PHYSICAL THERAPY INITIAL EVALUATION ADULT - NSPTDISCHREC_GEN_A_CORE
Pt is at baseline and does not require hospital skilled PT services. Pt will be taken off PT program, re consult if needed. Pt will benefit from OT consult/No skilled PT needs

## 2023-10-29 NOTE — DISCHARGE NOTE PROVIDER - NSFOLLOWUPCLINICS_GEN_ALL_ED_FT
[facility] [place]  Internal Medicine  [address] [place] [address]  Phone: [phone]  Fax: [phone]

## 2023-10-29 NOTE — PROGRESS NOTE ADULT - SUBJECTIVE AND OBJECTIVE BOX
DATE OF SERVICE: 10-28-23 @ 07:09    Patient is a 68y old  Male who presents with a chief complaint of Septic (27 Oct 2023 15:28)    SUBJECTIVE / OVERNIGHT EVENTS:  Overnight, no events.   Pt seen and examined at bedside. Patient reports he feels well today, clavicular pain is well controlled. He feels his swelling is mildly improved as well. No fevers or chills. No pain at the drain site.     ROS negative except as above.    PHYSICAL EXAM:  GENERAL: NAD, well-developed  HEAD:  Atraumatic, Normocephalic  EYES: EOMI, PERRLA, conjunctiva and sclera clear  NECK: Supple, No JVD  CHEST/LUNG: R chest wall REYNA drain noted, dressing c/d/i, drain with about 5 cc of bloody output. Surrounding swelling around drain site and mildly tender to palpation, improved from days prior    HEART: Regular rate and rhythm; No murmurs, rubs, or gallops  ABDOMEN: Soft, Nontender, Nondistended; Bowel sounds present  EXTREMITIES:  2+ Peripheral Pulses, No clubbing, cyanosis, or edema  PSYCH: AAOx3  NEUROLOGY: non-focal  SKIN: No rashes or lesions    VS  T(C): 36.5 (10-29-23 @ 05:43), Max: 36.8 (10-28-23 @ 21:49)  HR: 77 (10-29-23 @ 05:43) (77 - 85)  BP: 127/94 (10-29-23 @ 05:43) (127/94 - 133/88)  RR: 17 (10-29-23 @ 05:43) (17 - 18)  SpO2: 98% (10-29-23 @ 05:43) (95% - 98%)    LABS (available at time of writing 10-29-23 @ 07:06):                         12.1   21.48 )-----------( 425      ( 28 Oct 2023 06:08 )             36.6     10-28    138  |  99  |  26<H>  ----------------------------<  137<H>  4.2   |  27  |  1.04    Ca    9.4      28 Oct 2023 06:08  Phos  2.5     10-28  Mg     2.20     10-28        Urinalysis Basic - ( 28 Oct 2023 06:08 )    Color: x / Appearance: x / SG: x / pH: x  Gluc: 137 mg/dL / Ketone: x  / Bili: x / Urobili: x   Blood: x / Protein: x / Nitrite: x   Leuk Esterase: x / RBC: x / WBC x   Sq Epi: x / Non Sq Epi: x / Bacteria: x          Culture - Fungal, Tissue (collected 10-27-23 @ 11:18)  Source: .Tissue CLAVICULAR CAPLSULE  Preliminary Report (10-28-23 @ 15:02):    Culture is being performed. Fungal cultures are held for 4 weeks.    Culture - Acid Fast - Tissue w/Smear (collected 10-27-23 @ 11:18)  Source: .Tissue CLAVICULAR CAPLSULE  Preliminary Report (10-28-23 @ 15:06):    Culture is being performed.    Culture - Tissue with Gram Stain (collected 10-27-23 @ 11:18)  Source: .Tissue CLAVICULAR CAPLSULE  Gram Stain (10-28-23 @ 12:20):    No polymorphonuclear cells seen per low power field    No organisms seen per oil power field  Preliminary Report (10-28-23 @ 12:20):    Few Streptococcus agalactiae (Group B) isolated    Group B streptococci are susceptible to ampicillin,    penicillin and cefazolin, but may be resistant to    erythromycin and clindamycin.    Culture - Fungal, Other (collected 10-27-23 @ 11:18)  Source: .Other  Preliminary Report (10-28-23 @ 15:02):    Culture is being performed. Fungal cultures are held for 4 weeks.    Culture - Other (collected 10-27-23 @ 11:18)  Source: .Other  Preliminary Report (10-28-23 @ 17:30):    Rare Streptococcus agalactiae (Group B)    Streptococcus agalactiae (Group B) isolated    Group B streptococci are susceptible to ampicillin,    penicillin and cefazolin, but may be resistant to    erythromycin and clindamycin.    Culture - Fungal, Tissue (collected 10-27-23 @ 11:10)  Source: .[facility]issue [facility]  Preliminary Report (10-28-23 @ 15:02):    Culture is being performed. Fungal cultures are held for 4 weeks.    Culture - Acid Fast - Tissue w/Smear (collected 10-27-23 @ 11:10)  Source: .Tissue HEAD OF [facility]  Preliminary Report (10-28-23 @ 15:06):    Culture is being performed.    Culture - Tissue with Gram Stain (collected 10-27-23 @ 11:10)  Source: .Tissue HEAD OF [facility]  Gram Stain (10-28-23 @ 12:19):    No polymorphonuclear cells seen per low power field    No organisms seen per oil power field  Preliminary Report (10-28-23 @ 12:19):    Few Streptococcus agalactiae (Group B) isolated    Group B streptococci are susceptible to ampicillin,    penicillin and cefazolin, but may be resistant to    erythromycin and clindamycin.    Culture - Urine (collected 10-26-23 @ 07:28)  Source: Clean Catch Clean Catch (Midstream)  Final Report (10-27-23 @ 09:34):    <10,000 CFU/mL Normal Urogenital Louise    Culture - Blood (collected 10-25-23 @ 19:27)  Source: .Blood Blood-Venous  Preliminary Report (10-29-23 @ 01:01):    No growth at 72 Hours    Culture - Blood (collected 10-25-23 @ 19:07)  Source: .Blood Blood-Peripheral  Preliminary Report (10-29-23 @ 01:01):    No growth at 72 Hours    Medications:   acetaminophen     Tablet .. 975 milliGRAM(s) Oral every 8 hours PRN  ampicillin/sulbactam  IVPB 3 Gram(s) IV Intermittent every 6 hours  dextrose 50% Injectable 25 Gram(s) IV Push once  enalapril 10 milliGRAM(s) Oral daily  enoxaparin Injectable 40 milliGRAM(s) SubCutaneous every 24 hours  influenza  Vaccine (HIGH DOSE) 0.7 milliLiter(s) IntraMuscular once  insulin glargine Injectable (LANTUS) 6 Unit(s) SubCutaneous at bedtime  insulin lispro (ADMELOG) corrective regimen sliding scale   SubCutaneous three times a day before meals  insulin lispro (ADMELOG) corrective regimen sliding scale   SubCutaneous at bedtime  insulin lispro Injectable (ADMELOG) 4 Unit(s) SubCutaneous three times a day before meals  ketorolac   Injectable 15 milliGRAM(s) IV Push every 6 hours PRN      RADIOLOGY, EKG & ADDITIONAL TESTS: Reviewed.

## 2023-10-29 NOTE — DISCHARGE NOTE PROVIDER - CARE PROVIDER_API CALL
Brandy Loomis  Thoracic Surgery  [address] [place] [address], [place] [address]  Phone: [phone]  Fax: [phone]  Follow Up Time:     Evie Narvaez  Internal Medicine  [address] [place] [address], [address]  [place] [address]  Phone: [phone]  Fax: [phone]  Follow Up Time:    Brandy Loomis  Thoracic Surgery  [address] [place] [address], [place] [address]  Phone: [phone]  Fax: [phone]  Follow Up Time:     Evie Narvaez  Internal Medicine  [address] [address]  Phone: [phone]  Fax: [phone]  Follow Up Time:     Bernadette Delarosa)  Internal Medicine  [address] [place] [address]  Phone: [phone]  Fax: [phone]  Follow Up Time:

## 2023-10-29 NOTE — PROGRESS NOTE ADULT - ATTENDING COMMENTS
Patient seen and examined, care d/w HS resident.    68M type 2 diabetes, HTN, elevated PSA/bilateral hydrocele, epididymitis who presents with right neck pain, with  septic sternoclavicular joint/OM, s/p OR and cx with GBS.     Feeling well, pain controlled, tolerating diet, OOB     #Septic sternoclavicular joint due to GBS  - blood cx NG  - OR cx GBS  - s/p resection of right sternoclavicular joint with muscle advancement flap 10/27; has REYNA drain, drain care per surgery  - ID following, on Unasyn     #UTI: Outpatient urine cultures with Strep agalactiae. Empiric abx for above will cover.     #Thyroid nodule/?epiglottic lesion: CT neck showing thyroid nodule and asymmetric thickening of the right aryepiglottic fold. Appreciate ENT - no concern for mass based on laryngoscopy. Check TFTs. Outpatient thyroid ultrasound.     #T2DM: A1C 7.8%. Strict glycemic control given c/f septic joint   -c/w Lantus 6U qhs  - increase  Admelog 4U TID qac, adjust as needed.    Dc pending ID rec re: duration of tx and CTS, drain in place  PT no needs, OT consult

## 2023-10-29 NOTE — DISCHARGE NOTE PROVIDER - NSDCFUADDAPPT_GEN_ALL_CORE_FT
1. Please follow up with your Cardiothoracic surgeon Dr. Loomis within 1-2 weeks of hospital discharge to monitor your REYNA drain and surgical healing process.  2. Please follow up with your Primary Care Physician Dr. Narvaez within 1-2 weeks of hospital discharge. If you do not have a PCP, please feel free to make an appointment at the [facility] at [facility] to see a [facility] physician. 1. Please follow up with your Cardiothoracic surgeon Dr. Loomis within 1-2 weeks of hospital discharge to monitor your REYNA drain and surgical healing process.  2. Please follow up with your Primary Care Physician Dr. Narvaez within 1-2 weeks of hospital discharge. If you do not have a PCP, please feel free to make an appointment at the [facility] at [facility] to see a [facility] physician.  3. Please follow up with your infectious disease doctor within 1-2 weeks.

## 2023-10-29 NOTE — DISCHARGE NOTE PROVIDER - CARE PROVIDERS DIRECT ADDRESSES
[email or web address]

## 2023-10-29 NOTE — DISCHARGE NOTE PROVIDER - NSDCMRMEDTOKEN_GEN_ALL_CORE_FT
atorvastatin 40 mg oral tablet: 1 tab(s) orally once a day  enalapril 10 mg oral tablet: 1 tab(s) orally once a day  glipiZIDE 5 mg oral tablet: 1 tab(s) orally once a day  Jardiance 25 mg oral tablet: 1 tab(s) orally once a day  metFORMIN 1000 mg oral tablet, extended release: 1 tab(s) orally 2 times a day  omeprazole 20 mg oral delayed release tablet: 1 tab(s) orally once a day before breakfast   atorvastatin 40 mg oral tablet: 1 tab(s) orally once a day  cefTRIAXone 2 g/50 mL-iso-osmotic dextrose intravenous solution: 2 gram(s) intravenously once a day Please take one dose daily until 12/8/23 then STOP  enalapril 10 mg oral tablet: 1 tab(s) orally once a day  glipiZIDE 5 mg oral tablet: 1 tab(s) orally once a day  Jardiance 25 mg oral tablet: 1 tab(s) orally once a day  metFORMIN 1000 mg oral tablet, extended release: 1 tab(s) orally 2 times a day  omeprazole 20 mg oral delayed release tablet: 1 tab(s) orally once a day before breakfast  Outpatient OT: Outpatient Occupational Therapy for 2-3x a week for 4-6 weeks for debility (ICD10: R54)  PICC Abx Labs: Weekly CBC/diff, CMP, ESR/CRP while on antibiotics  PICC line flushes: 5cc NS flushes for pre- and post- IV antibiotics

## 2023-10-29 NOTE — DISCHARGE NOTE PROVIDER - NSDCFUADDINST_GEN_ALL_CORE_FT
Continue to use arm sling until your appointment with Dr. Loomis  Keep wound uncovered until your appointment

## 2023-10-29 NOTE — PROGRESS NOTE ADULT - PROBLEM SELECTOR PLAN 1
CT Chest showing concern for osteomyelitis vs Septic joint of R Sternoclavicular joint. Patient Afebrile, with only mild subclinical fever, although 50-60% of septic arthritis can present afebrile.   Most likely source is strep anginosus as isolated from 10/23 throat cx vs. GBS in urine (less likely)   S/p OR debridement and drainage on 10/27 with thoracic surgery, REYNA drain left in place     - s/p Vanc/Zosyn 10/25 - 10/26. c/w Unasyn (10-26 - ). will likely need PICC for prolonged abx   - MR Shoulder Joint for further evaluation of potential osteo  - monitor REYNA drain output; c/w bulb suction  - f/u Bcx and tissue cx from OR; f/u throat cx sensitivities

## 2023-10-29 NOTE — DISCHARGE NOTE PROVIDER - NSDCFUSCHEDAPPT_GEN_ALL_CORE_FT
[facility]  UROLOGY [address]  Scheduled Appointment: 10/30/2023    Diego Vicente  [facility]  UROLOGY [address]  Scheduled Appointment: 10/30/2023    Konstantin Molina  [facility]  OTOLARYNG [address] [place] [address]  Scheduled Appointment: 11/16/2023     Konstantin Molina  [facility]  OTOLARYNG [address] [place] [address]  Scheduled Appointment: 11/16/2023

## 2023-10-29 NOTE — PHYSICAL THERAPY INITIAL EVALUATION ADULT - RANGE OF MOTION EXAMINATION, REHAB EVAL
right UE ROM limited due to sling/Left LE ROM was WFL (within functional limits)/bilateral lower extremity ROM was WFL (within functional limits)

## 2023-10-29 NOTE — DISCHARGE NOTE PROVIDER - NSDCCPTREATMENT_GEN_ALL_CORE_FT
PRINCIPAL PROCEDURE  Procedure: CT chest w con  Findings and Treatment: IMPRESSION:  1. Asymmetric widening of the right sternoclavicular joint with fluid in   the joint and cortical irregularities involving the sternoclavicular   joint and cartilage of the first rib. Softtissue density material   extending from the right lower neck to the posterior aspect of the right   clavicle. Findings are overall suspicious for septic sternoclavicular   joint and possible underlying osteomyelitis with associated spread of   infection into the lower neck and posterior to the clavicle. MRI is   recommended for further evaluation.  2. Hypodense thyroid nodules for which nonurgent follow-up ultrasound is   recommended.  3. Small right pleural effusion and mild right basilar atelectasis.        SECONDARY PROCEDURE  Procedure: CT head and neck  Findings and Treatment: IMPRESSION:  1. Low-density soft tissue in the right lower neck extending into the   chest, suspected to be related to sternoclavicular infection, described   in more detail on separately dictated chest CT of the same day.  2. Asymmetric thickening of the right aryepiglottic fold. Underlying mass   is not excluded. Follow-up with ENT and direct visualization with scope   is recommended.  3. Bilateral thyroid nodules measuring up to 1.7 cm on the left.   Correlation with nonemergent follow-up ultrasound is recommended.< from: CT Neck Soft Tissue w/ IV Cont (10.25.23 @ 22:26) >      Procedure: Transthoracic echocardiography (TTE)  Findings and Treatment: CONCLUSIONS:      1. Left ventricular cavity is normal. Left ventricular wall thickness is normal. Left ventricular systolic function is normal with an ejection fraction of 62 % by Puentes's method of disks.   2. Normal right ventricular cavity size and systolic function.   3. Structurally normal mitral valve with normal leaflet excursion.   4. Normal atria.   5. Ascending aorta diameter is dilated, measuring 4.10 cm (indexed 2.05 cm/m²).   6. No pericardial effusion seen.< from: TTE W or WO Ultrasound Enhancing Agent (10.30.23 @ 16:01) >

## 2023-10-29 NOTE — PROGRESS NOTE ADULT - SUBJECTIVE AND OBJECTIVE BOX
Pt seen. Sitting at edge of bed. Wife  at bedside. Pt denies any pain. Wearing sling  No fevers.     Vital Signs Last 24 Hrs  T(C): 36.5 (29 Oct 2023 05:43), Max: 36.8 (28 Oct 2023 21:49)  T(F): 97.7 (29 Oct 2023 05:43), Max: 98.3 (28 Oct 2023 21:49)  HR: 77 (29 Oct 2023 05:43) (77 - 85)  BP: 127/94 (29 Oct 2023 05:43) (127/94 - 133/88)  BP(mean): --  RR: 17 (29 Oct 2023 05:43) (17 - 18)  SpO2: 98% (29 Oct 2023 05:43) (95% - 98%)    Parameters below as of 29 Oct 2023 05:43  Patient On (Oxygen Delivery Method): room air    A+O x 3  Rt SCJ incision c/d/i. Primary dressing removed.  Staple line c/d/i  Reyna drain in place, stripped. Output 46cc/24hrs to bulb suction  Serosang fluid.   RT arm supported in sling.   OR cultures + Strept Agalactiae    A/P: 69yo M with Rt SCJ infection. S/p Rt SC Joint Resection and muscle flap on 10/27/23    -F/u ID for antibiotic recommendations  -F/u Final cultures  -Cont arm to sling  -Cont REYNA drain to SS.   -Keep wound uncovered  -Pain management  Care per primary team  Will follow.

## 2023-10-30 ENCOUNTER — APPOINTMENT (OUTPATIENT)
Dept: UROLOGY | Facility: CLINIC | Age: 68
End: 2023-10-30

## 2023-10-30 LAB
ANION GAP SERPL CALC-SCNC: 16 MMOL/L — HIGH (ref 7–14)
ANION GAP SERPL CALC-SCNC: 16 MMOL/L — HIGH (ref 7–14)
BACTERIA BLD CULT: NORMAL
BACTERIA BLD CULT: NORMAL
BUN SERPL-MCNC: 18 MG/DL — SIGNIFICANT CHANGE UP (ref 7–23)
BUN SERPL-MCNC: 18 MG/DL — SIGNIFICANT CHANGE UP (ref 7–23)
CALCIUM SERPL-MCNC: 8.5 MG/DL — SIGNIFICANT CHANGE UP (ref 8.4–10.5)
CALCIUM SERPL-MCNC: 8.5 MG/DL — SIGNIFICANT CHANGE UP (ref 8.4–10.5)
CHLORIDE SERPL-SCNC: 102 MMOL/L — SIGNIFICANT CHANGE UP (ref 98–107)
CHLORIDE SERPL-SCNC: 102 MMOL/L — SIGNIFICANT CHANGE UP (ref 98–107)
CO2 SERPL-SCNC: 26 MMOL/L — SIGNIFICANT CHANGE UP (ref 22–31)
CO2 SERPL-SCNC: 26 MMOL/L — SIGNIFICANT CHANGE UP (ref 22–31)
CREAT SERPL-MCNC: 0.86 MG/DL — SIGNIFICANT CHANGE UP (ref 0.5–1.3)
CREAT SERPL-MCNC: 0.86 MG/DL — SIGNIFICANT CHANGE UP (ref 0.5–1.3)
EGFR: 95 ML/MIN/1.73M2 — SIGNIFICANT CHANGE UP
EGFR: 95 ML/MIN/1.73M2 — SIGNIFICANT CHANGE UP
GLUCOSE BLDC GLUCOMTR-MCNC: 112 MG/DL — HIGH (ref 70–99)
GLUCOSE BLDC GLUCOMTR-MCNC: 112 MG/DL — HIGH (ref 70–99)
GLUCOSE BLDC GLUCOMTR-MCNC: 132 MG/DL — HIGH (ref 70–99)
GLUCOSE BLDC GLUCOMTR-MCNC: 132 MG/DL — HIGH (ref 70–99)
GLUCOSE BLDC GLUCOMTR-MCNC: 133 MG/DL — HIGH (ref 70–99)
GLUCOSE BLDC GLUCOMTR-MCNC: 133 MG/DL — HIGH (ref 70–99)
GLUCOSE BLDC GLUCOMTR-MCNC: 182 MG/DL — HIGH (ref 70–99)
GLUCOSE BLDC GLUCOMTR-MCNC: 182 MG/DL — HIGH (ref 70–99)
GLUCOSE SERPL-MCNC: 146 MG/DL — HIGH (ref 70–99)
GLUCOSE SERPL-MCNC: 146 MG/DL — HIGH (ref 70–99)
HCT VFR BLD CALC: 35.5 % — LOW (ref 39–50)
HCT VFR BLD CALC: 35.5 % — LOW (ref 39–50)
HGB BLD-MCNC: 11.6 G/DL — LOW (ref 13–17)
HGB BLD-MCNC: 11.6 G/DL — LOW (ref 13–17)
MAGNESIUM SERPL-MCNC: 1.7 MG/DL — SIGNIFICANT CHANGE UP (ref 1.6–2.6)
MAGNESIUM SERPL-MCNC: 1.7 MG/DL — SIGNIFICANT CHANGE UP (ref 1.6–2.6)
MCHC RBC-ENTMCNC: 28.8 PG — SIGNIFICANT CHANGE UP (ref 27–34)
MCHC RBC-ENTMCNC: 28.8 PG — SIGNIFICANT CHANGE UP (ref 27–34)
MCHC RBC-ENTMCNC: 32.7 GM/DL — SIGNIFICANT CHANGE UP (ref 32–36)
MCHC RBC-ENTMCNC: 32.7 GM/DL — SIGNIFICANT CHANGE UP (ref 32–36)
MCV RBC AUTO: 88.1 FL — SIGNIFICANT CHANGE UP (ref 80–100)
MCV RBC AUTO: 88.1 FL — SIGNIFICANT CHANGE UP (ref 80–100)
NRBC # BLD: 0 /100 WBCS — SIGNIFICANT CHANGE UP (ref 0–0)
NRBC # BLD: 0 /100 WBCS — SIGNIFICANT CHANGE UP (ref 0–0)
NRBC # FLD: 0 K/UL — SIGNIFICANT CHANGE UP (ref 0–0)
NRBC # FLD: 0 K/UL — SIGNIFICANT CHANGE UP (ref 0–0)
PHOSPHATE SERPL-MCNC: 2.2 MG/DL — LOW (ref 2.5–4.5)
PHOSPHATE SERPL-MCNC: 2.2 MG/DL — LOW (ref 2.5–4.5)
PLATELET # BLD AUTO: 469 K/UL — HIGH (ref 150–400)
PLATELET # BLD AUTO: 469 K/UL — HIGH (ref 150–400)
POTASSIUM SERPL-MCNC: 3.6 MMOL/L — SIGNIFICANT CHANGE UP (ref 3.5–5.3)
POTASSIUM SERPL-MCNC: 3.6 MMOL/L — SIGNIFICANT CHANGE UP (ref 3.5–5.3)
POTASSIUM SERPL-SCNC: 3.6 MMOL/L — SIGNIFICANT CHANGE UP (ref 3.5–5.3)
POTASSIUM SERPL-SCNC: 3.6 MMOL/L — SIGNIFICANT CHANGE UP (ref 3.5–5.3)
RBC # BLD: 4.03 M/UL — LOW (ref 4.2–5.8)
RBC # BLD: 4.03 M/UL — LOW (ref 4.2–5.8)
RBC # FLD: 14 % — SIGNIFICANT CHANGE UP (ref 10.3–14.5)
RBC # FLD: 14 % — SIGNIFICANT CHANGE UP (ref 10.3–14.5)
SODIUM SERPL-SCNC: 144 MMOL/L — SIGNIFICANT CHANGE UP (ref 135–145)
SODIUM SERPL-SCNC: 144 MMOL/L — SIGNIFICANT CHANGE UP (ref 135–145)
WBC # BLD: 10.39 K/UL — SIGNIFICANT CHANGE UP (ref 3.8–10.5)
WBC # BLD: 10.39 K/UL — SIGNIFICANT CHANGE UP (ref 3.8–10.5)
WBC # FLD AUTO: 10.39 K/UL — SIGNIFICANT CHANGE UP (ref 3.8–10.5)
WBC # FLD AUTO: 10.39 K/UL — SIGNIFICANT CHANGE UP (ref 3.8–10.5)

## 2023-10-30 PROCEDURE — 93306 TTE W/DOPPLER COMPLETE: CPT | Mod: 26

## 2023-10-30 PROCEDURE — 99232 SBSQ HOSP IP/OBS MODERATE 35: CPT | Mod: GC

## 2023-10-30 PROCEDURE — 99233 SBSQ HOSP IP/OBS HIGH 50: CPT | Mod: GC

## 2023-10-30 RX ORDER — MAGNESIUM SULFATE 500 MG/ML
1 VIAL (ML) INJECTION ONCE
Refills: 0 | Status: COMPLETED | OUTPATIENT
Start: 2023-10-30 | End: 2023-10-30

## 2023-10-30 RX ORDER — CEFTRIAXONE 500 MG/1
2 INJECTION, POWDER, FOR SOLUTION INTRAMUSCULAR; INTRAVENOUS
Qty: 9 | Refills: 0
Start: 2023-10-30 | End: 2023-11-07

## 2023-10-30 RX ORDER — POTASSIUM PHOSPHATE, MONOBASIC POTASSIUM PHOSPHATE, DIBASIC 236; 224 MG/ML; MG/ML
15 INJECTION, SOLUTION INTRAVENOUS ONCE
Refills: 0 | Status: COMPLETED | OUTPATIENT
Start: 2023-10-30 | End: 2023-10-30

## 2023-10-30 RX ADMIN — AMPICILLIN SODIUM AND SULBACTAM SODIUM 200 GRAM(S): 250; 125 INJECTION, POWDER, FOR SUSPENSION INTRAMUSCULAR; INTRAVENOUS at 18:06

## 2023-10-30 RX ADMIN — POTASSIUM PHOSPHATE, MONOBASIC POTASSIUM PHOSPHATE, DIBASIC 62.5 MILLIMOLE(S): 236; 224 INJECTION, SOLUTION INTRAVENOUS at 12:57

## 2023-10-30 RX ADMIN — AMPICILLIN SODIUM AND SULBACTAM SODIUM 200 GRAM(S): 250; 125 INJECTION, POWDER, FOR SUSPENSION INTRAMUSCULAR; INTRAVENOUS at 06:13

## 2023-10-30 RX ADMIN — Medication 4 UNIT(S): at 12:58

## 2023-10-30 RX ADMIN — Medication 10 MILLIGRAM(S): at 06:12

## 2023-10-30 RX ADMIN — AMPICILLIN SODIUM AND SULBACTAM SODIUM 200 GRAM(S): 250; 125 INJECTION, POWDER, FOR SUSPENSION INTRAMUSCULAR; INTRAVENOUS at 12:57

## 2023-10-30 RX ADMIN — ENOXAPARIN SODIUM 40 MILLIGRAM(S): 100 INJECTION SUBCUTANEOUS at 06:12

## 2023-10-30 RX ADMIN — Medication 4 UNIT(S): at 08:35

## 2023-10-30 RX ADMIN — INSULIN GLARGINE 6 UNIT(S): 100 INJECTION, SOLUTION SUBCUTANEOUS at 21:37

## 2023-10-30 RX ADMIN — Medication 4 UNIT(S): at 18:06

## 2023-10-30 RX ADMIN — AMPICILLIN SODIUM AND SULBACTAM SODIUM 200 GRAM(S): 250; 125 INJECTION, POWDER, FOR SUSPENSION INTRAMUSCULAR; INTRAVENOUS at 23:38

## 2023-10-30 RX ADMIN — Medication 100 GRAM(S): at 10:08

## 2023-10-30 NOTE — CHART NOTE - NSCHARTNOTESELECT_GEN_ALL_CORE
Pre-IR note/Event Note
Thoracic Surgery Post OP Note/Event Note
Thoracic Surgery/Event Note
Medical Pre Optimization
Thoracic Surgery

## 2023-10-30 NOTE — PROGRESS NOTE ADULT - SUBJECTIVE AND OBJECTIVE BOX
Follow Up:  septic joint, osteo    Interval History/ROS: no fever, cough, diarrhea, chest pain controlled, OR cultures with strep agalactiae        Allergies  No Known Allergies        ANTIMICROBIALS:  ampicillin/sulbactam  IVPB 3 every 6 hours      OTHER MEDS:  acetaminophen     Tablet .. 975 milliGRAM(s) Oral every 8 hours PRN  dextrose 50% Injectable 25 Gram(s) IV Push once  enalapril 10 milliGRAM(s) Oral daily  enoxaparin Injectable 40 milliGRAM(s) SubCutaneous every 24 hours  influenza  Vaccine (HIGH DOSE) 0.7 milliLiter(s) IntraMuscular once  insulin glargine Injectable (LANTUS) 6 Unit(s) SubCutaneous at bedtime  insulin lispro (ADMELOG) corrective regimen sliding scale   SubCutaneous three times a day before meals  insulin lispro (ADMELOG) corrective regimen sliding scale   SubCutaneous at bedtime  insulin lispro Injectable (ADMELOG) 4 Unit(s) SubCutaneous three times a day before meals  ketorolac   Injectable 15 milliGRAM(s) IV Push every 6 hours PRN  potassium phosphate IVPB 15 milliMole(s) IV Intermittent once      Vital Signs Last 24 Hrs  T(C): 36.6 (30 Oct 2023 06:10), Max: 37 (29 Oct 2023 14:38)  T(F): 97.9 (30 Oct 2023 06:10), Max: 98.6 (29 Oct 2023 14:38)  HR: 78 (30 Oct 2023 06:10) (75 - 89)  BP: 137/99 (30 Oct 2023 06:10) (128/78 - 137/99)  BP(mean): --  RR: 18 (30 Oct 2023 06:10) (18 - 18)  SpO2: 100% (30 Oct 2023 06:10) (96% - 100%)    Parameters below as of 30 Oct 2023 06:10  Patient On (Oxygen Delivery Method): room air        Physical Exam:  General:    NAD,  non toxic  Respiratory:    comfortable on RA  abd:     soft,     no tenderness  :   no CVAT,  no suprapubic tenderness,   no  morin  Musculoskeletal:   chest incision clean with drain  vascular: no phlebitis  Skin:    no rash                          11.6   10.39 )-----------( 469      ( 30 Oct 2023 06:22 )             35.5       10-30    144  |  102  |  18  ----------------------------<  146<H>  3.6   |  26  |  0.86    Ca    8.5      30 Oct 2023 06:22  Phos  2.2     10-30  Mg     1.70     10-30        Urinalysis Basic - ( 30 Oct 2023 06:22 )    Color: x / Appearance: x / SG: x / pH: x  Gluc: 146 mg/dL / Ketone: x  / Bili: x / Urobili: x   Blood: x / Protein: x / Nitrite: x   Leuk Esterase: x / RBC: x / WBC x   Sq Epi: x / Non Sq Epi: x / Bacteria: x        MICROBIOLOGY:  v  .Other  10-27-23   Rare Streptococcus agalactiae (Group B)  Streptococcus agalactiae (Group B) isolated  Group B streptococci are susceptible to ampicillin,  penicillin and cefazolin, but may be resistant to  erythromycin and clindamycin.  --    No polymorphonuclear cells seen per low power field  No organisms seen per oil power field      .Tissue HEAD OF CLAIDE  10-27-23   Few Streptococcus agalactiae (Group B) isolated  Group B streptococci are susceptible to ampicillin,  penicillin and cefazolin, but may be resistant to  erythromycin and clindamycin.  --    No polymorphonuclear cells seen per low power field  No organisms seen per oil power field      Clean Catch Clean Catch (Midstream)  10-26-23   <10,000 CFU/mL Normal Urogenital Louise  --  --      .Blood Blood-Venous  10-25-23   No growth at 4 days  --  --      .Blood Blood-Peripheral  10-25-23   No growth at 4 days  --  --                RADIOLOGY:  Images independently visualized and reviewed personally, findings as below  < from: Xray Chest 1 View- PORTABLE-Urgent (Xray Chest 1 View- PORTABLE-Urgent .) (10.27.23 @ 10:23) >  IMPRESSION:  Clear lungs.    < end of copied text >  < from: CT Chest w/ IV Cont (10.25.23 @ 22:26) >  IMPRESSION:    1. Asymmetric widening of the right sternoclavicular joint with fluid in   the joint and cortical irregularities involving the sternoclavicular   joint and cartilage of the first rib. Softtissue density material   extending from the right lower neck to the posterior aspect of the right   clavicle. Findings are overall suspicious for septic sternoclavicular   joint and possible underlying osteomyelitis with associated spread of   infection into the lower neck and posterior to the clavicle. MRI is   recommended for further evaluation.    2. Hypodense thyroid nodules for which nonurgent follow-up ultrasound is   recommended.    3. Small right pleural effusion and mild right basilar atelectasis.      < end of copied text >

## 2023-10-30 NOTE — PROGRESS NOTE ADULT - TIME BILLING
Time spent includes direct patient care  (interview and examination of patient), discussion with other providers, support staff and/or patient's family members, review of medical records, ordering diagnostic tests and analyzing results, and documentation.
The necessity of the time spent during the encounter on this date of service was due to:   - Direct patient care ( interview and independently obtaining a review of systems and performing a physical exam)and documentation  - Ordering, reviewing, and interpreting labs, testing, and imaging   - Reviewing prior hospitalization and where necessary, outpatient records.  - Discussion with consultants, other providers, support staff  - Counselling and educating patient and family regarding interpretation of aforementioned items and plan of care.
Time spent includes direct patient care  (interview and examination of patient), discussion with other providers, support staff and/or patient's family members, review of medical records, ordering diagnostic tests and analyzing results, and documentation.

## 2023-10-30 NOTE — PROGRESS NOTE ADULT - SUBJECTIVE AND OBJECTIVE BOX
Subjective: pt seen and examined w Dr Loomis  no acute complaints    Vital Signs:  Vital Signs Last 24 Hrs  T(C): 37 (10-30-23 @ 13:36), Max: 37 (10-30-23 @ 13:36)  T(F): 98.6 (10-30-23 @ 13:36), Max: 98.6 (10-30-23 @ 13:36)  HR: 88 (10-30-23 @ 13:36) (75 - 88)  BP: 124/84 (10-30-23 @ 13:36) (124/84 - 137/99)  RR: 17 (10-30-23 @ 13:36) (17 - 18)  SpO2: 97% (10-30-23 @ 13:36) (96% - 100%) on (O2)    PE  A+O x 3  Rt SCJ incision c/d/i. Primary dressing removed.  Staple line c/d/i  Reyna drain in place, stripped. Output 10cc/24hrs to bulb suction  Serosang fluid.   RT arm supported in sling.   OR cultures + Strept Agalactiae    A/P: 69yo M with Rt SCJ infection. S/p Rt SC Joint Resection and muscle flap on 10/27/23    -F/u ID for antibiotic recommendations  -F/u Final cultures  -Cont arm to sling  -Cont REYNA drain to SS.   -Keep wound uncovered  -Pain management  -no objection to discharge home, plan to keep drain upon discharge - pt will need VNS   d/w medicine ACP    Gely THOMPSON [id number]

## 2023-10-30 NOTE — PROGRESS NOTE ADULT - PROBLEM SELECTOR PLAN 3
Home: Enalapril 10 qd    - C/W Enalapril 10 qd Patient on Jardiance 25 qd and Metformin 1000 BID    - ISS Patient on Jardiance 25 qd and Metformin 1000 BID  - Lantus 6U, Admelog 4U  - ISS

## 2023-10-30 NOTE — PROGRESS NOTE ADULT - PROBLEM SELECTOR PLAN 1
CT Chest showing concern for osteomyelitis vs Septic joint of R Sternoclavicular joint. Patient Afebrile, with only mild subclinical fever, although 50-60% of septic arthritis can present afebrile.   Most likely source is strep anginosus as isolated from 10/23 throat cx vs. GBS in urine (less likely)   S/p OR debridement and drainage on 10/27 with thoracic surgery, REYNA drain left in place     - s/p Vanc/Zosyn 10/25 - 10/26. c/w Unasyn (10-26 - ). will likely need PICC for prolonged abx   - MR Shoulder Joint for further evaluation of potential osteo  - monitor REYNA drain output; c/w bulb suction  - f/u Bcx and tissue cx from OR; f/u throat cx sensitivities CT Chest showing concern for osteomyelitis vs Septic joint of R Sternoclavicular joint. Patient Afebrile, with only mild subclinical fever, although 50-60% of septic arthritis can present afebrile.   S/p OR SC Joint Resection and muscle flap on 10/27 with thoracic surgery, REYNA drain left in place   OR Tissue Cx positive for GBS    - s/p Vanc/Zosyn 10/25 - 10/26.   - c/w Unasyn (10-26 - ). will likely need PICC for prolonged abx, will reach out to ID regarding further management  - monitor REYNA drain output; c/w bulb suction  - f/u Bcx and tissue cx from OR; f/u throat cx sensitivities  - f/u TTE to r/o endocarditis CT Chest showing concern for osteomyelitis vs Septic joint of R Sternoclavicular joint  S/p OR SC Joint Resection and muscle flap on 10/27 with thoracic surgery, REYNA drain left in place  OR Tissue Cx positive for GBS, prior troat cx positive for strep anginosis    plan  - s/p Vanc/Zosyn 10/25 - 10/26.   - c/w Unasyn (10-26 - )  - will order PICC line placement for 6 weeks abx on discharge  - will discharge on ceftriaxone 2 qd through 12/8 with weekly CBC/diff, CMP, ESR, CRP  while on antibiotics outpatient  - monitor REYNA drain output; c/w bulb suction  - f/u Bcx and tissue cx from OR; f/u throat cx sensitivities  - f/u TTE to r/o endocarditis

## 2023-10-30 NOTE — PROGRESS NOTE ADULT - ASSESSMENT
68M w/HTN T2DM, HLD, Elevated PSA/Bilateral Hydrocele s/p epididymitis treatment presents with concern for septic arthritis iso radiographic evidence and severe shoulder pain with possible source being GBS UTI; now started on IV Abx, s/p debridement and joint irrigation per thoracic surgery (10/27).  68M w/HTN T2DM, HLD, Elevated PSA/Bilateral Hydrocele s/p epididymitis treatment presents with concern for septic arthritis iso radiographic evidence and severe shoulder pain with possible source being GBS UTI; now started on IV Abx, s/p SC Joint Resection and muscle flap (10/27).  68M w/HTN T2DM, HLD, Elevated PSA/Bilateral Hydrocele s/p epididymitis treatment presents with concern for septic arthritis iso radiographic evidence and severe shoulder pain with possible source being GBS UTI; now started on IV Unasyn, s/p SC Joint Resection and muscle flap (10/27), plan for 6 wk course antibiotics

## 2023-10-30 NOTE — PROGRESS NOTE ADULT - PROBLEM SELECTOR PLAN 4
Diet: DASH diet  DVT ppx: HSQ  Code Status: Full Code  Dispo: pending Home: Enalapril 10 qd    - C/W Enalapril 10 qd

## 2023-10-30 NOTE — PROGRESS NOTE ADULT - ATTENDING COMMENTS
68M type 2 diabetes, HTN, elevated PSA/bilateral hydrocele, epididymitis who presents with right neck pain, with  septic sternoclavicular joint/OM. CT neck/chest: Asymmetric widening of the right sternoclavicular joint with fluid in the joint and cortical irregularities involving the sternoclavicular joint and cartilage of the first rib. Soft tissue density material extending from the right lower neck to the posterior aspect of the right clavicle. Findings are overall suspicious for septic sternoclavicular joint and possible underlying osteomyelitis with associated spread of infection into the lower neck and posterior to the clavicle.    Pt seen and examined by me. Feeling well, denies pain. On exam, staples present in Right clavicular region, Right arm in sling. Drain with pouch- 500cc of serosanguineous blood    #Septic sternoclavicular joint due to GBS- S/p Rt SC Joint Resection and muscle flap on 10/27/23. Has REYNA drain, drain care per surgery. Blood cx NG,  OR cultures with strep agalactiae. Outpt throat cultures grew strep anginosis. ID following, on Unasyn – Transition to Ceftriaxone 2 qd through 12/8. FU CTS recs re- Drain     #UTI: Outpatient urine cultures with Strep agalactiae. Empiric abx for above will cover.     #Thyroid nodule/?epiglottic lesion: CT neck showing thyroid nodule and asymmetric thickening of the right aryepiglottic fold. Appreciate ENT - no concern for mass based on laryngoscopy.TFTs Cassandra. Outpatient thyroid ultrasound.     #T2DM: A1C 7.8%. Strict glycemic control given c/f septic joint. -c/w Lantus 6U qhs.   Admelog 4U TID qac, adjust as needed. Transition to home meds on dc     Dc planning pending CTS, drain in place. will plan for PICC and outpt long term abx once cleared by CTS   PT/OT  no needs 68M type 2 diabetes, HTN, elevated PSA/bilateral hydrocele, epididymitis who presents with right neck pain, with  septic sternoclavicular joint/OM. CT neck/chest: Asymmetric widening of the right sternoclavicular joint with fluid in the joint and cortical irregularities involving the sternoclavicular joint and cartilage of the first rib. Soft tissue density material extending from the right lower neck to the posterior aspect of the right clavicle. Findings are overall suspicious for septic sternoclavicular joint and possible underlying osteomyelitis with associated spread of infection into the lower neck and posterior to the clavicle.    Pt seen and examined by me. Feeling well, denies pain. On exam, staples present in Right clavicular region, Right arm in sling. Drain with pouch- 500cc of serosanguineous blood    #Septic sternoclavicular joint due to GBS- S/p Rt SC Joint Resection and muscle flap on 10/27/23. Has REYNA drain, drain care per surgery. Blood cx NG,  OR cultures with strep agalactiae. Outpt throat cultures grew strep anginosis. ID following, on Unasyn – Transition to Ceftriaxone 2 qd through 12/8. DW CTS recs - ok to dc with drain, pt will need to be followed by VNS on dc. Also FU CTS in 1 week, staples to stay in place until outpt FU     #UTI: Outpatient urine cultures with Strep agalactiae. Empiric abx for above will cover.     #Thyroid nodule/?epiglottic lesion: CT neck showing thyroid nodule and asymmetric thickening of the right aryepiglottic fold. Appreciate ENT - no concern for mass based on laryngoscopy.TFTs Cassandra. Outpatient thyroid ultrasound.     #T2DM: A1C 7.8%. Strict glycemic control given c/f septic joint. -c/w Lantus 6U qhs.   Admelog 4U TID qac, adjust as needed. Transition to home meds on dc     Dc planning- Plan for PICC and outpt long term abx . FU Outpt with CTS Dr Loomis in 1 week  PT/OT  no needs 68M type 2 diabetes, HTN, elevated PSA/bilateral hydrocele, epididymitis who presents with right neck pain, with  septic sternoclavicular joint/OM. CT neck/chest: Asymmetric widening of the right sternoclavicular joint with fluid in the joint and cortical irregularities involving the sternoclavicular joint and cartilage of the first rib. Soft tissue density material extending from the right lower neck to the posterior aspect of the right clavicle. Findings are overall suspicious for septic sternoclavicular joint and possible underlying osteomyelitis with associated spread of infection into the lower neck and posterior to the clavicle.    Pt seen and examined by me. Feeling well, denies pain. On exam, staples present in Right clavicular region, Right arm in sling. Drain with pouch- 50cc of serosanguineous blood    #Septic sternoclavicular joint due to GBS- S/p Rt SC Joint Resection and muscle flap on 10/27/23. Has REYNA drain, drain care per surgery. Blood cx NG,  OR cultures with strep agalactiae. Outpt throat cultures grew strep anginosis. ID following, on Unasyn – Transition to Ceftriaxone 2 qd through 12/8. DW CTS recs - ok to dc with drain, pt will need to be followed by VNS on dc. Also FU CTS in 1 week, staples to stay in place until outpt FU     #UTI: Outpatient urine cultures with Strep agalactiae. Empiric abx for above will cover.     #Thyroid nodule/?epiglottic lesion: CT neck showing thyroid nodule and asymmetric thickening of the right aryepiglottic fold. Appreciate ENT - no concern for mass based on laryngoscopy.TFTs Cassandra. Outpatient thyroid ultrasound.     #T2DM: A1C 7.8%. Strict glycemic control given c/f septic joint. -c/w Lantus 6U qhs.   Admelog 4U TID qac, adjust as needed. Transition to home meds on dc     Dc planning- Plan for PICC and outpt long term abx . FU Outpt with CTS Dr Loomis in 1 week  PT/OT  no needs

## 2023-10-30 NOTE — OCCUPATIONAL THERAPY INITIAL EVALUATION ADULT - GENERAL OBSERVATIONS, REHAB EVAL
Patient is alert and following directions. Vitals: Patient is alert and following directions. Vitals: HR 72 BPM

## 2023-10-30 NOTE — PROGRESS NOTE ADULT - ASSESSMENT
68 m with DM, HTN, HLD, Elevated PSA/Hydrocele, p/w R chest pain, neck, throat pain, edema, hoarseness and hematuria  u/a outpatient showed hematuria and urine cx: 10-49K GBS  throat cx: strep anginosis  afebrile, WBC: 19, ESR: 78  CRP: 198   CT: Asymmetric widening of the R sternoclavicular joint with fluid in the joint and cortical irregularities involving the sternoclavicular joint and cartilage of the first rib. Soft tissue density material extending from the right lower neck to the posterior aspect of the right clavicle, s/o septic sternoclavicular joint and possible underlying osteomyelitis with associated spread of infection into the lower neck and posterior to the clavicle.   neck CT: Low-density soft tissue in the right lower neck extending into the chest, suspected to be related to sternoclavicular infection, Asymmetric thickening of the right aryepiglottic fold. Underlying mass is not excluded.     strep anginosis of throat and neck extending to R sternoclavicular joint with septic arthritis and possible osteo  hematuria, unclear etiology, now u/a unremarkable, blood and urine cx negative  s/p OR 10/27, s/p resection of right sternoclavicular joint with muscle advancement flap, OR cultures with strep agalactiae    * c/w unasyn 3 q 6 while in the hospital  * will complete a 6 week course post op  * place a picc  * on discharge will switch to ceftriaxone 2 qd through 12/8  * weeklyr CBC/diff, CMP, ESR, CRP  while on antibiotics    The above assessment and plan was discussed with the primary team    Bernadette Delarosa MD  contact on teams  After 5pm and on weekends call [phone].

## 2023-10-30 NOTE — PROGRESS NOTE ADULT - SUBJECTIVE AND OBJECTIVE BOX
***************************************************************  Toñito Oswald, PGY1  Internal Medicine   TEAMS Preferred  ***************************************************************    PATRICK KAMARA  68y Male  MRN: [id number]  10-26-23 (4d)    Patient is a 68y old  Male who presents with a chief complaint of Septic (29 Oct 2023 14:07)      SUBJECTIVE / OVERNIGHT EVENTS:   No acute overnight events.   Patient seen and examined at bedside this AM.  Denies any CP, SOB, N/V, constipation, diarrhea, abdominal pain, dysuria, fever, chills, or headaches.     12 Point ROS negative with the exception of the above    MEDICATIONS  (STANDING):  ampicillin/sulbactam  IVPB 3 Gram(s) IV Intermittent every 6 hours  dextrose 50% Injectable 25 Gram(s) IV Push once  enalapril 10 milliGRAM(s) Oral daily  enoxaparin Injectable 40 milliGRAM(s) SubCutaneous every 24 hours  influenza  Vaccine (HIGH DOSE) 0.7 milliLiter(s) IntraMuscular once  insulin glargine Injectable (LANTUS) 6 Unit(s) SubCutaneous at bedtime  insulin lispro (ADMELOG) corrective regimen sliding scale   SubCutaneous three times a day before meals  insulin lispro (ADMELOG) corrective regimen sliding scale   SubCutaneous at bedtime  insulin lispro Injectable (ADMELOG) 4 Unit(s) SubCutaneous three times a day before meals    MEDICATIONS  (PRN):  acetaminophen     Tablet .. 975 milliGRAM(s) Oral every 8 hours PRN Mild Pain (1 - 3), Moderate Pain (4 - 6), Severe Pain (7 - 10)  ketorolac   Injectable 15 milliGRAM(s) IV Push every 6 hours PRN Severe Pain (7 - 10)      OBJECTIVE:  Vital Signs Last 24 Hrs  T(C): 36.6 (30 Oct 2023 06:10), Max: 37 (29 Oct 2023 14:38)  T(F): 97.9 (30 Oct 2023 06:10), Max: 98.6 (29 Oct 2023 14:38)  HR: 78 (30 Oct 2023 06:10) (75 - 89)  BP: 137/99 (30 Oct 2023 06:10) (128/78 - 137/99)  BP(mean): --  RR: 18 (30 Oct 2023 06:10) (18 - 18)  SpO2: 100% (30 Oct 2023 06:10) (96% - 100%)    Parameters below as of 30 Oct 2023 06:10  Patient On (Oxygen Delivery Method): room air        I&O's Summary    29 Oct 2023 07:01  -  30 Oct 2023 07:00  --------------------------------------------------------  IN: 0 mL / OUT: 10 mL / NET: -10 mL        PHYSICAL EXAM:  GENERAL: Laying comfortably, NAD  HEENT: NCAT, PERRLA, EOMI, no scleral icterus, no LAD  NECK: No JVD, supple  LUNG: CTABL; No wheezes, crackles, or rhonchi  HEART: RRR; normal S1/S2; No murmurs, rubs, or gallops  ABDOMEN: +BS, soft, nontender, nondistended, no HSM; No rebound, guarding, or rigidity  EXTREMITIES:  No LE edema b/l, 2+ Peripheral Pulses, No clubbing or cyanosis  NEUROLOGY: AOx3, non-focal, strength 5/5 in all extremities, sensation intact  PSYCH: calm and cooperative  SKIN: No rashes or lesions    LABS:                        12.1   15.11 )-----------( 448      ( 29 Oct 2023 05:54 )             36.5     Auto Eosinophil # x     / Auto Eosinophil % x     / Auto Neutrophil # x     / Auto Neutrophil % x     / BANDS % x        10-29    137  |  101  |  24<H>  ----------------------------<  179<H>  4.1   |  27  |  1.06  10-28    138  |  99  |  26<H>  ----------------------------<  137<H>  4.2   |  27  |  1.04    Ca    9.1      29 Oct 2023 05:54  Ca    9.4      28 Oct 2023 06:08  Phos  2.2     10-29  Mg     2.10     10-29            Urinalysis Basic - ( 29 Oct 2023 05:54 )    Color: x / Appearance: x / SG: x / pH: x  Gluc: 179 mg/dL / Ketone: x  / Bili: x / Urobili: x   Blood: x / Protein: x / Nitrite: x   Leuk Esterase: x / RBC: x / WBC x   Sq Epi: x / Non Sq Epi: x / Bacteria: x          CAPILLARY BLOOD GLUCOSE      POCT Blood Glucose.: 216 mg/dL (29 Oct 2023 21:24)  POCT Blood Glucose.: 121 mg/dL (29 Oct 2023 17:25)  POCT Blood Glucose.: 88 mg/dL (29 Oct 2023 12:34)  POCT Blood Glucose.: 107 mg/dL (29 Oct 2023 08:46)        RADIOLOGY & ADDITIONAL TESTS:     ***************************************************************  Toñito Oswald, PGY1  Internal Medicine   TEAMS Preferred  ***************************************************************    PATRICK KAMARA  68y Male  MRN: [id number]  10-26-23 (4d)    Patient is a 68y old  Male who presents with a chief complaint of Septic (29 Oct 2023 14:07)      SUBJECTIVE / OVERNIGHT EVENTS:   No acute overnight events.   Patient seen and examined at bedside this AM. Slept well tolerating diet. Ambulating well. Denies any arm pain, tolerating brace.   Denies any CP, SOB, N/V, constipation, diarrhea, abdominal pain, dysuria, fever, chills, or headaches.     12 Point ROS negative with the exception of the above    MEDICATIONS  (STANDING):  ampicillin/sulbactam  IVPB 3 Gram(s) IV Intermittent every 6 hours  dextrose 50% Injectable 25 Gram(s) IV Push once  enalapril 10 milliGRAM(s) Oral daily  enoxaparin Injectable 40 milliGRAM(s) SubCutaneous every 24 hours  influenza  Vaccine (HIGH DOSE) 0.7 milliLiter(s) IntraMuscular once  insulin glargine Injectable (LANTUS) 6 Unit(s) SubCutaneous at bedtime  insulin lispro (ADMELOG) corrective regimen sliding scale   SubCutaneous three times a day before meals  insulin lispro (ADMELOG) corrective regimen sliding scale   SubCutaneous at bedtime  insulin lispro Injectable (ADMELOG) 4 Unit(s) SubCutaneous three times a day before meals    MEDICATIONS  (PRN):  acetaminophen     Tablet .. 975 milliGRAM(s) Oral every 8 hours PRN Mild Pain (1 - 3), Moderate Pain (4 - 6), Severe Pain (7 - 10)  ketorolac   Injectable 15 milliGRAM(s) IV Push every 6 hours PRN Severe Pain (7 - 10)      OBJECTIVE:  Vital Signs Last 24 Hrs  T(C): 36.6 (30 Oct 2023 06:10), Max: 37 (29 Oct 2023 14:38)  T(F): 97.9 (30 Oct 2023 06:10), Max: 98.6 (29 Oct 2023 14:38)  HR: 78 (30 Oct 2023 06:10) (75 - 89)  BP: 137/99 (30 Oct 2023 06:10) (128/78 - 137/99)  BP(mean): --  RR: 18 (30 Oct 2023 06:10) (18 - 18)  SpO2: 100% (30 Oct 2023 06:10) (96% - 100%)    Parameters below as of 30 Oct 2023 06:10  Patient On (Oxygen Delivery Method): room air        I&O's Summary    29 Oct 2023 07:01  -  30 Oct 2023 07:00  --------------------------------------------------------  IN: 0 mL / OUT: 10 mL / NET: -10 mL        PHYSICAL EXAM:  GENERAL: Laying comfortably, NAD  HEENT: NCAT, PERRLA, EOMI, no scleral icterus, no LAD  NECK: No JVD, supple  LUNG: CTABL; No wheezes, crackles, or rhonchi  HEART: RRR; normal S1/S2; No murmurs, rubs, or gallops  ABDOMEN: +BS, soft, nontender, nondistended, no HSM; No rebound, guarding, or rigidity  EXTREMITIES:  No LE edema b/l, 2+ Peripheral Pulses, No clubbing or cyanosis  NEUROLOGY: AOx3, non-focal, strength 5/5 in all extremities, sensation intact  PSYCH: calm and cooperative  SKIN: No rashes or lesions    LABS:                        12.1   15.11 )-----------( 448      ( 29 Oct 2023 05:54 )             36.5     Auto Eosinophil # x     / Auto Eosinophil % x     / Auto Neutrophil # x     / Auto Neutrophil % x     / BANDS % x        10-29    137  |  101  |  24<H>  ----------------------------<  179<H>  4.1   |  27  |  1.06  10-28    138  |  99  |  26<H>  ----------------------------<  137<H>  4.2   |  27  |  1.04    Ca    9.1      29 Oct 2023 05:54  Ca    9.4      28 Oct 2023 06:08  Phos  2.2     10-29  Mg     2.10     10-29            Urinalysis Basic - ( 29 Oct 2023 05:54 )    Color: x / Appearance: x / SG: x / pH: x  Gluc: 179 mg/dL / Ketone: x  / Bili: x / Urobili: x   Blood: x / Protein: x / Nitrite: x   Leuk Esterase: x / RBC: x / WBC x   Sq Epi: x / Non Sq Epi: x / Bacteria: x          CAPILLARY BLOOD GLUCOSE      POCT Blood Glucose.: 216 mg/dL (29 Oct 2023 21:24)  POCT Blood Glucose.: 121 mg/dL (29 Oct 2023 17:25)  POCT Blood Glucose.: 88 mg/dL (29 Oct 2023 12:34)  POCT Blood Glucose.: 107 mg/dL (29 Oct 2023 08:46)        RADIOLOGY & ADDITIONAL TESTS:     ***************************************************************  Toñito Oswald, PGY1  Internal Medicine   TEAMS Preferred  ***************************************************************    PATRICK KAMARA  68y Male  MRN: [id number]  10-26-23 (4d)    Patient is a 68y old  Male who presents with a chief complaint of Septic (29 Oct 2023 14:07)      SUBJECTIVE / OVERNIGHT EVENTS:   No acute overnight events.   Patient seen and examined at bedside this AM. Slept well tolerating diet. Ambulating well. Denies any arm pain, tolerating brace.   Denies any CP, SOB, N/V, constipation, diarrhea, abdominal pain, dysuria, fever, chills, or headaches.     12 Point ROS negative with the exception of the above    MEDICATIONS  (STANDING):  ampicillin/sulbactam  IVPB 3 Gram(s) IV Intermittent every 6 hours  dextrose 50% Injectable 25 Gram(s) IV Push once  enalapril 10 milliGRAM(s) Oral daily  enoxaparin Injectable 40 milliGRAM(s) SubCutaneous every 24 hours  influenza  Vaccine (HIGH DOSE) 0.7 milliLiter(s) IntraMuscular once  insulin glargine Injectable (LANTUS) 6 Unit(s) SubCutaneous at bedtime  insulin lispro (ADMELOG) corrective regimen sliding scale   SubCutaneous three times a day before meals  insulin lispro (ADMELOG) corrective regimen sliding scale   SubCutaneous at bedtime  insulin lispro Injectable (ADMELOG) 4 Unit(s) SubCutaneous three times a day before meals    MEDICATIONS  (PRN):  acetaminophen     Tablet .. 975 milliGRAM(s) Oral every 8 hours PRN Mild Pain (1 - 3), Moderate Pain (4 - 6), Severe Pain (7 - 10)  ketorolac   Injectable 15 milliGRAM(s) IV Push every 6 hours PRN Severe Pain (7 - 10)      OBJECTIVE:  Vital Signs Last 24 Hrs  T(C): 36.6 (30 Oct 2023 06:10), Max: 37 (29 Oct 2023 14:38)  T(F): 97.9 (30 Oct 2023 06:10), Max: 98.6 (29 Oct 2023 14:38)  HR: 78 (30 Oct 2023 06:10) (75 - 89)  BP: 137/99 (30 Oct 2023 06:10) (128/78 - 137/99)  BP(mean): --  RR: 18 (30 Oct 2023 06:10) (18 - 18)  SpO2: 100% (30 Oct 2023 06:10) (96% - 100%)    Parameters below as of 30 Oct 2023 06:10  Patient On (Oxygen Delivery Method): room air        I&O's Summary    29 Oct 2023 07:01  -  30 Oct 2023 07:00  --------------------------------------------------------  IN: 0 mL / OUT: 10 mL / NET: -10 mL        PHYSICAL EXAM:  GENERAL: Laying comfortably, NAD  HEENT: NCAT, PERRLA, EOMI, no scleral icterus, no LAD  NECK: No JVD, supple  LUNG: CTABL; No wheezes, crackles, or rhonchi  HEART: RRR; normal S1/S2; No murmurs, rubs, or gallops  ABDOMEN: +BS, soft, nontender, nondistended, no HSM; No rebound, guarding, or rigidity  EXTREMITIES:  No LE edema b/l, 2+ Peripheral Pulses, No clubbing or cyanosis  NEUROLOGY: AOx3, non-focal, strength 5/5 in all extremities, sensation intact  PSYCH: calm and cooperative  SKIN: No rashes or lesions, Surgical staples intact, wound dressing intact with REYNA drain producing bloody fluid ~50cc    LABS:                        12.1   15.11 )-----------( 448      ( 29 Oct 2023 05:54 )             36.5     Auto Eosinophil # x     / Auto Eosinophil % x     / Auto Neutrophil # x     / Auto Neutrophil % x     / BANDS % x        10-29    137  |  101  |  24<H>  ----------------------------<  179<H>  4.1   |  27  |  1.06  10-28    138  |  99  |  26<H>  ----------------------------<  137<H>  4.2   |  27  |  1.04    Ca    9.1      29 Oct 2023 05:54  Ca    9.4      28 Oct 2023 06:08  Phos  2.2     10-29  Mg     2.10     10-29            Urinalysis Basic - ( 29 Oct 2023 05:54 )    Color: x / Appearance: x / SG: x / pH: x  Gluc: 179 mg/dL / Ketone: x  / Bili: x / Urobili: x   Blood: x / Protein: x / Nitrite: x   Leuk Esterase: x / RBC: x / WBC x   Sq Epi: x / Non Sq Epi: x / Bacteria: x          CAPILLARY BLOOD GLUCOSE      POCT Blood Glucose.: 216 mg/dL (29 Oct 2023 21:24)  POCT Blood Glucose.: 121 mg/dL (29 Oct 2023 17:25)  POCT Blood Glucose.: 88 mg/dL (29 Oct 2023 12:34)  POCT Blood Glucose.: 107 mg/dL (29 Oct 2023 08:46)        RADIOLOGY & ADDITIONAL TESTS:

## 2023-10-30 NOTE — CHART NOTE - NSCHARTNOTEFT_GEN_A_CORE
PRE-INTERVENTIONAL RADIOLOGY PROCEDURE NOTE      Patient Age: 68    Patient Gender: M    Procedure: PICC line placement    Diagnosis/Indication: 6wk antibiotics    Interventional Radiology Attending Physician:    Ordering Attending Physician: Dr. Ceron    Pertinent Medical History: 68M w/HTN T2DM, HLD, Elevated PSA/Bilateral Hydrocele s/p epididymitis treatment presents with concern for septic arthritis iso radiographic evidence and severe shoulder pain with possible source being GBS UTI; now started on IV Unasyn, s/p SC Joint Resection and muscle flap (10/27), plan for 6 wk course antibiotics    Pertinent labs:                      11.6   10.39 )-----------( 469      ( 30 Oct 2023 06:22 )             35.5       10-30    144  |  102  |  18  ----------------------------<  146<H>  3.6   |  26  |  0.86    Ca    8.5      30 Oct 2023 06:22  Phos  2.2     10-30  Mg     1.70     10-30                Patient and Family Aware ? Yes

## 2023-10-31 ENCOUNTER — TRANSCRIPTION ENCOUNTER (OUTPATIENT)
Age: 68
End: 2023-10-31

## 2023-10-31 VITALS
SYSTOLIC BLOOD PRESSURE: 133 MMHG | HEART RATE: 88 BPM | OXYGEN SATURATION: 100 % | DIASTOLIC BLOOD PRESSURE: 96 MMHG | TEMPERATURE: 98 F | RESPIRATION RATE: 18 BRPM

## 2023-10-31 LAB
ANION GAP SERPL CALC-SCNC: 12 MMOL/L — SIGNIFICANT CHANGE UP (ref 7–14)
ANION GAP SERPL CALC-SCNC: 12 MMOL/L — SIGNIFICANT CHANGE UP (ref 7–14)
BUN SERPL-MCNC: 18 MG/DL — SIGNIFICANT CHANGE UP (ref 7–23)
BUN SERPL-MCNC: 18 MG/DL — SIGNIFICANT CHANGE UP (ref 7–23)
CALCIUM SERPL-MCNC: 9.3 MG/DL — SIGNIFICANT CHANGE UP (ref 8.4–10.5)
CALCIUM SERPL-MCNC: 9.3 MG/DL — SIGNIFICANT CHANGE UP (ref 8.4–10.5)
CHLORIDE SERPL-SCNC: 100 MMOL/L — SIGNIFICANT CHANGE UP (ref 98–107)
CHLORIDE SERPL-SCNC: 100 MMOL/L — SIGNIFICANT CHANGE UP (ref 98–107)
CO2 SERPL-SCNC: 28 MMOL/L — SIGNIFICANT CHANGE UP (ref 22–31)
CO2 SERPL-SCNC: 28 MMOL/L — SIGNIFICANT CHANGE UP (ref 22–31)
CREAT SERPL-MCNC: 0.94 MG/DL — SIGNIFICANT CHANGE UP (ref 0.5–1.3)
CREAT SERPL-MCNC: 0.94 MG/DL — SIGNIFICANT CHANGE UP (ref 0.5–1.3)
CULTURE RESULTS: SIGNIFICANT CHANGE UP
EGFR: 89 ML/MIN/1.73M2 — SIGNIFICANT CHANGE UP
EGFR: 89 ML/MIN/1.73M2 — SIGNIFICANT CHANGE UP
GLUCOSE BLDC GLUCOMTR-MCNC: 126 MG/DL — HIGH (ref 70–99)
GLUCOSE BLDC GLUCOMTR-MCNC: 126 MG/DL — HIGH (ref 70–99)
GLUCOSE BLDC GLUCOMTR-MCNC: 127 MG/DL — HIGH (ref 70–99)
GLUCOSE BLDC GLUCOMTR-MCNC: 127 MG/DL — HIGH (ref 70–99)
GLUCOSE BLDC GLUCOMTR-MCNC: 208 MG/DL — HIGH (ref 70–99)
GLUCOSE BLDC GLUCOMTR-MCNC: 208 MG/DL — HIGH (ref 70–99)
GLUCOSE BLDC GLUCOMTR-MCNC: 222 MG/DL — HIGH (ref 70–99)
GLUCOSE BLDC GLUCOMTR-MCNC: 222 MG/DL — HIGH (ref 70–99)
GLUCOSE SERPL-MCNC: 132 MG/DL — HIGH (ref 70–99)
GLUCOSE SERPL-MCNC: 132 MG/DL — HIGH (ref 70–99)
HCT VFR BLD CALC: 37.1 % — LOW (ref 39–50)
HCT VFR BLD CALC: 37.1 % — LOW (ref 39–50)
HGB BLD-MCNC: 11.8 G/DL — LOW (ref 13–17)
HGB BLD-MCNC: 11.8 G/DL — LOW (ref 13–17)
MAGNESIUM SERPL-MCNC: 1.9 MG/DL — SIGNIFICANT CHANGE UP (ref 1.6–2.6)
MAGNESIUM SERPL-MCNC: 1.9 MG/DL — SIGNIFICANT CHANGE UP (ref 1.6–2.6)
MCHC RBC-ENTMCNC: 28.5 PG — SIGNIFICANT CHANGE UP (ref 27–34)
MCHC RBC-ENTMCNC: 28.5 PG — SIGNIFICANT CHANGE UP (ref 27–34)
MCHC RBC-ENTMCNC: 31.8 GM/DL — LOW (ref 32–36)
MCHC RBC-ENTMCNC: 31.8 GM/DL — LOW (ref 32–36)
MCV RBC AUTO: 89.6 FL — SIGNIFICANT CHANGE UP (ref 80–100)
MCV RBC AUTO: 89.6 FL — SIGNIFICANT CHANGE UP (ref 80–100)
NRBC # BLD: 0 /100 WBCS — SIGNIFICANT CHANGE UP (ref 0–0)
NRBC # BLD: 0 /100 WBCS — SIGNIFICANT CHANGE UP (ref 0–0)
NRBC # FLD: 0 K/UL — SIGNIFICANT CHANGE UP (ref 0–0)
NRBC # FLD: 0 K/UL — SIGNIFICANT CHANGE UP (ref 0–0)
PHOSPHATE SERPL-MCNC: 2.9 MG/DL — SIGNIFICANT CHANGE UP (ref 2.5–4.5)
PHOSPHATE SERPL-MCNC: 2.9 MG/DL — SIGNIFICANT CHANGE UP (ref 2.5–4.5)
PLATELET # BLD AUTO: 476 K/UL — HIGH (ref 150–400)
PLATELET # BLD AUTO: 476 K/UL — HIGH (ref 150–400)
POTASSIUM SERPL-MCNC: 4.4 MMOL/L — SIGNIFICANT CHANGE UP (ref 3.5–5.3)
POTASSIUM SERPL-MCNC: 4.4 MMOL/L — SIGNIFICANT CHANGE UP (ref 3.5–5.3)
POTASSIUM SERPL-SCNC: 4.4 MMOL/L — SIGNIFICANT CHANGE UP (ref 3.5–5.3)
POTASSIUM SERPL-SCNC: 4.4 MMOL/L — SIGNIFICANT CHANGE UP (ref 3.5–5.3)
RBC # BLD: 4.14 M/UL — LOW (ref 4.2–5.8)
RBC # BLD: 4.14 M/UL — LOW (ref 4.2–5.8)
RBC # FLD: 14.2 % — SIGNIFICANT CHANGE UP (ref 10.3–14.5)
RBC # FLD: 14.2 % — SIGNIFICANT CHANGE UP (ref 10.3–14.5)
SODIUM SERPL-SCNC: 140 MMOL/L — SIGNIFICANT CHANGE UP (ref 135–145)
SODIUM SERPL-SCNC: 140 MMOL/L — SIGNIFICANT CHANGE UP (ref 135–145)
SPECIMEN SOURCE: SIGNIFICANT CHANGE UP
WBC # BLD: 10.49 K/UL — SIGNIFICANT CHANGE UP (ref 3.8–10.5)
WBC # BLD: 10.49 K/UL — SIGNIFICANT CHANGE UP (ref 3.8–10.5)
WBC # FLD AUTO: 10.49 K/UL — SIGNIFICANT CHANGE UP (ref 3.8–10.5)
WBC # FLD AUTO: 10.49 K/UL — SIGNIFICANT CHANGE UP (ref 3.8–10.5)

## 2023-10-31 PROCEDURE — 36573 INSJ PICC RS&I 5 YR+: CPT

## 2023-10-31 PROCEDURE — 99239 HOSP IP/OBS DSCHRG MGMT >30: CPT | Mod: GC

## 2023-10-31 RX ORDER — SODIUM CHLORIDE 9 MG/ML
10 INJECTION INTRAMUSCULAR; INTRAVENOUS; SUBCUTANEOUS
Refills: 0 | Status: DISCONTINUED | OUTPATIENT
Start: 2023-10-31 | End: 2023-10-31

## 2023-10-31 RX ORDER — CHLORHEXIDINE GLUCONATE 213 G/1000ML
1 SOLUTION TOPICAL
Refills: 0 | Status: DISCONTINUED | OUTPATIENT
Start: 2023-10-31 | End: 2023-10-31

## 2023-10-31 RX ORDER — CEFTRIAXONE 500 MG/1
2000 INJECTION, POWDER, FOR SOLUTION INTRAMUSCULAR; INTRAVENOUS EVERY 24 HOURS
Refills: 0 | Status: DISCONTINUED | OUTPATIENT
Start: 2023-10-31 | End: 2023-10-31

## 2023-10-31 RX ADMIN — Medication 4 UNIT(S): at 08:54

## 2023-10-31 RX ADMIN — Medication 10 MILLIGRAM(S): at 05:05

## 2023-10-31 RX ADMIN — ENOXAPARIN SODIUM 40 MILLIGRAM(S): 100 INJECTION SUBCUTANEOUS at 05:05

## 2023-10-31 RX ADMIN — AMPICILLIN SODIUM AND SULBACTAM SODIUM 200 GRAM(S): 250; 125 INJECTION, POWDER, FOR SUSPENSION INTRAMUSCULAR; INTRAVENOUS at 05:05

## 2023-10-31 RX ADMIN — CEFTRIAXONE 100 MILLIGRAM(S): 500 INJECTION, POWDER, FOR SOLUTION INTRAMUSCULAR; INTRAVENOUS at 12:47

## 2023-10-31 RX ADMIN — Medication 4 UNIT(S): at 13:16

## 2023-10-31 RX ADMIN — Medication 2: at 18:15

## 2023-10-31 RX ADMIN — Medication 4 UNIT(S): at 18:15

## 2023-10-31 NOTE — PROGRESS NOTE ADULT - ASSESSMENT
68M w/HTN T2DM, HLD, Elevated PSA/Bilateral Hydrocele s/p epididymitis treatment presents with concern for septic arthritis iso radiographic evidence and severe shoulder pain with possible source being GBS UTI; now started on IV Unasyn, s/p SC Joint Resection and muscle flap (10/27), plan for 6 wk course antibiotics

## 2023-10-31 NOTE — PROGRESS NOTE ADULT - PROBLEM SELECTOR PLAN 2
CT neck with evidence of thyroid nodule   ENT evaluated felt no concern for mass and can be eval with outpt thyroid US and TFTs  will f/u outpatient

## 2023-10-31 NOTE — PROGRESS NOTE ADULT - PROBLEM SELECTOR PLAN 1
CT Chest showing concern for osteomyelitis vs Septic joint of R Sternoclavicular joint  S/p OR SC Joint Resection and muscle flap on 10/27 with thoracic surgery, REYNA drain left in place  OR Tissue Cx positive for GBS, prior troat cx positive for strep anginosis  TTE w/o evidence of endocarditis    plan  - c/w Unasyn (10-26 - ) until discharge  - plan PICC line placement possibly today  - d/c with ceftriaxone 2 qd through 12/8 with weekly CBC/diff, CMP, ESR, CRP  while on antibiotics outpatient  - monitor REYNA drain output; c/w bulb suction, will keep in place upon discharge - pt will need VNS  - c/w arm sling, keep wound uncovered on discharge  - f/u CTS 1 week after d/c

## 2023-10-31 NOTE — DISCHARGE NOTE NURSING/CASE MANAGEMENT/SOCIAL WORK - NSDCFUADDAPPT_GEN_ALL_CORE_FT
1. Please follow up with your Cardiothoracic surgeon Dr. Loomis within 1-2 weeks of hospital discharge to monitor your REYNA drain and surgical healing process.  2. Please follow up with your Primary Care Physician Dr. Narvaez within 1-2 weeks of hospital discharge. If you do not have a PCP, please feel free to make an appointment at the [facility] at [facility] to see a [facility] physician.  3. Please follow up with your infectious disease doctor within 1-2 weeks.

## 2023-10-31 NOTE — PROGRESS NOTE ADULT - ATTENDING COMMENTS
68M type 2 diabetes, HTN, elevated PSA/bilateral hydrocele, epididymitis who presents with right neck pain, with  septic sternoclavicular joint/OM. CT neck/chest: Asymmetric widening of the right sternoclavicular joint with fluid in the joint and cortical irregularities involving the sternoclavicular joint and cartilage of the first rib. Soft tissue density material extending from the right lower neck to the posterior aspect of the right clavicle. Findings are overall suspicious for septic sternoclavicular joint and possible underlying osteomyelitis with associated spread of infection into the lower neck and posterior to the clavicle     Pt seen and examined by me. Feeling well, denies pain. On exam, staples present in Right clavicular region, Right arm in sling. Drain with pouch- around 20cc of serosanguineous blood    #Septic sternoclavicular joint due to GBS- S/p Rt SC Joint Resection and muscle flap on 10/27/23. Has REYNA drain, drain care per surgery. Blood cx NG,  OR cultures with strep agalactiae. Outpt throat cultures grew strep anginosis. ID following, on Unasyn – Transition to Ceftriaxone 2 qd through 12/8. DW  CTS recs- ok for dc with drain, FU as outpt for staples removal     #UTI: Outpatient urine cultures with Strep agalactiae. Empiric abx for above will cover.     #Thyroid nodule/?epiglottic lesion: CT neck showing thyroid nodule and asymmetric thickening of the right aryepiglottic fold. Appreciate ENT - no concern for mass based on laryngoscopy.TFTs Cassandra. Outpatient thyroid ultrasound.     #T2DM: A1C 7.8%. Strict glycemic control given c/f septic joint. -c/w Lantus 6U qhs.   Admelog 4U TID qac, adjust as needed. Resume home meds on dc   PT/OT- no needs. Advise  follow up outpatient with Dr. Loomis in 1-2 weeks. Fu with PMD, ID as outpt. Dc planning  33 mins

## 2023-10-31 NOTE — PROGRESS NOTE ADULT - SUBJECTIVE AND OBJECTIVE BOX
Vital Signs Last 24 Hrs  T(C): 36.4 (31 Oct 2023 05:01), Max: 37 (30 Oct 2023 13:36)  T(F): 97.6 (31 Oct 2023 05:01), Max: 98.6 (30 Oct 2023 13:36)  HR: 74 (31 Oct 2023 05:01) (74 - 88)  BP: 137/99 (31 Oct 2023 05:01) (124/84 - 141/93)  BP(mean): --  RR: 16 (31 Oct 2023 05:01) (16 - 17)  SpO2: 96% (31 Oct 2023 05:01) (96% - 97%)    Parameters below as of 31 Oct 2023 05:01  Patient On (Oxygen Delivery Method): room air        General: A&Ox3, NAD  HEENT: Normocephalic. Vision and hearing grossly intact, EOMI. Airway grossly patent, no stridor.  CV: S1S2, RRR, well perfused  Resp: Breathing comfortably, no resp distress, no accessory muscle use  GI: Soft, NT, ND, +BS  MSK: MCMAHAN x4  Pysch: Appropriate affect                          11.8   10.49 )-----------( 476      ( 31 Oct 2023 05:27 )             37.1       10-31    140  |  100  |  18  ----------------------------<  132<H>  4.4   |  28  |  0.94    Ca    9.3      31 Oct 2023 05:27  Phos  2.9     10-31  Mg     1.90     10-31    A/P:  69y/o Male pmhx DM, HTN, HLD, admitted to [facility] with Right SC Joint pain concerning for osteomyelitis with spread into neck/clavicle.  10/27 - s/p Right SC Joint Resection with Muscle Flap  10/31 - Possible PICC today for long term abx.     - Follow up final cultures  - Pt to be discharged with REYNA drain  - Pending PICC for long term IV Abx, ID for abx recs.  - Continue to drain bulb, document output amount and quality  - Continue arm to sling.  - Pain management  - Pt can follow up outpatient with Dr. Loomis in 1-2 weeks.  - No Thoracic objection to discharge home with drain and VNS. Patient seen and examined at bedside.  Resting comfortably in bed on RA, in NAD.  Patient admits to having no pain at this time.    Vital Signs Last 24 Hrs  T(C): 36.4 (31 Oct 2023 05:01), Max: 37 (30 Oct 2023 13:36)  T(F): 97.6 (31 Oct 2023 05:01), Max: 98.6 (30 Oct 2023 13:36)  HR: 74 (31 Oct 2023 05:01) (74 - 88)  BP: 137/99 (31 Oct 2023 05:01) (124/84 - 141/93)  BP(mean): --  RR: 16 (31 Oct 2023 05:01) (16 - 17)  SpO2: 96% (31 Oct 2023 05:01) (96% - 97%)    Parameters below as of 31 Oct 2023 05:01  Patient On (Oxygen Delivery Method): room air        General: A&Ox3, NAD  HEENT: Normocephalic. Vision and hearing grossly intact, EOMI. Airway grossly patent, no stridor.  CV: RRR, well perfused  Resp: Breathing comfortably on RA, no resp distress, no accessory muscle use  GI: Soft, NT, ND  MSK: R Arm in sling. Otherwise moving all other extremities freely.  Pysch: Appropriate affect  Incisions: R clavicular incision c/d/i, staples in place  Tubes: R shoulder REYNA to Bulb                          11.8   10.49 )-----------( 476      ( 31 Oct 2023 05:27 )             37.1       10-31    140  |  100  |  18  ----------------------------<  132<H>  4.4   |  28  |  0.94    Ca    9.3      31 Oct 2023 05:27  Phos  2.9     10-31  Mg     1.90     10-31    A/P:  69y/o Male pmhx DM, HTN, HLD, admitted to [facility] with Right SC Joint pain concerning for osteomyelitis with spread into neck/clavicle.  10/27 - s/p Right SC Joint Resection with Muscle Flap  10/31 - Possible PICC today for long term abx.     - Follow up final cultures  - Pt to be discharged with REYNA drain  - Pending PICC for long term IV Abx, ID for abx recs.  - Continue to drain bulb, document output amount and quality  - Continue arm to sling.  - Pain management  - Pt can follow up outpatient with Dr. Loomis in 1-2 weeks.  - No Thoracic objection to discharge home with drain and VNS.

## 2023-10-31 NOTE — PROGRESS NOTE ADULT - PROBLEM SELECTOR PLAN 5
Home: Enalapril 10 qd    Plan:  C/W Enalapril 10 qd
Diet: DASH diet  DVT ppx: Lovenox 40   Code Status: Full Code  Dispo: Outpatient OT
Diet: DASH diet  DVT ppx: HSQ  Code Status: Full Code  Dispo: pending

## 2023-10-31 NOTE — PROGRESS NOTE ADULT - SUBJECTIVE AND OBJECTIVE BOX
***************************************************************  Toñito Oswald, PGY1  Internal Medicine   TEAMS Preferred  ***************************************************************    PATRICK KAMARA  68y Male  MRN: [id number]  10-26-23 (5d)    Patient is a 68y old  Male who presents with a chief complaint of Septic (30 Oct 2023 15:15)      SUBJECTIVE / OVERNIGHT EVENTS:   No acute overnight events.   Patient seen and examined at bedside this AM.  Denies any CP, SOB, N/V, constipation, diarrhea, abdominal pain, dysuria, fever, chills, or headaches.     12 Point ROS negative with the exception of the above    MEDICATIONS  (STANDING):  ampicillin/sulbactam  IVPB 3 Gram(s) IV Intermittent every 6 hours  dextrose 50% Injectable 25 Gram(s) IV Push once  enalapril 10 milliGRAM(s) Oral daily  enoxaparin Injectable 40 milliGRAM(s) SubCutaneous every 24 hours  influenza  Vaccine (HIGH DOSE) 0.7 milliLiter(s) IntraMuscular once  insulin glargine Injectable (LANTUS) 6 Unit(s) SubCutaneous at bedtime  insulin lispro (ADMELOG) corrective regimen sliding scale   SubCutaneous three times a day before meals  insulin lispro (ADMELOG) corrective regimen sliding scale   SubCutaneous at bedtime  insulin lispro Injectable (ADMELOG) 4 Unit(s) SubCutaneous three times a day before meals    MEDICATIONS  (PRN):  acetaminophen     Tablet .. 975 milliGRAM(s) Oral every 8 hours PRN Mild Pain (1 - 3), Moderate Pain (4 - 6), Severe Pain (7 - 10)  ketorolac   Injectable 15 milliGRAM(s) IV Push every 6 hours PRN Severe Pain (7 - 10)      OBJECTIVE:  Vital Signs Last 24 Hrs  T(C): 36.4 (31 Oct 2023 05:01), Max: 37 (30 Oct 2023 13:36)  T(F): 97.6 (31 Oct 2023 05:01), Max: 98.6 (30 Oct 2023 13:36)  HR: 74 (31 Oct 2023 05:01) (74 - 88)  BP: 137/99 (31 Oct 2023 05:01) (124/84 - 141/93)  BP(mean): --  RR: 16 (31 Oct 2023 05:01) (16 - 17)  SpO2: 96% (31 Oct 2023 05:01) (96% - 97%)    Parameters below as of 31 Oct 2023 05:01  Patient On (Oxygen Delivery Method): room air        I&O's Summary    29 Oct 2023 07:01  -  30 Oct 2023 07:00  --------------------------------------------------------  IN: 0 mL / OUT: 10 mL / NET: -10 mL    30 Oct 2023 07:01  -  31 Oct 2023 06:36  --------------------------------------------------------  IN: 0 mL / OUT: 50 mL / NET: -50 mL        PHYSICAL EXAM:  GENERAL: Laying comfortably, NAD  HEENT: NCAT, PERRLA, EOMI, no scleral icterus, no LAD  NECK: No JVD, supple  LUNG: CTABL; No wheezes, crackles, or rhonchi  HEART: RRR; normal S1/S2; No murmurs, rubs, or gallops  ABDOMEN: +BS, soft, nontender, nondistended, no HSM; No rebound, guarding, or rigidity  EXTREMITIES:  No LE edema b/l, 2+ Peripheral Pulses, No clubbing or cyanosis  NEUROLOGY: AOx3, non-focal, strength 5/5 in all extremities, sensation intact  PSYCH: calm and cooperative  SKIN: No rashes or lesions    LABS:                        11.6   10.39 )-----------( 469      ( 30 Oct 2023 06:22 )             35.5     Auto Eosinophil # x     / Auto Eosinophil % x     / Auto Neutrophil # x     / Auto Neutrophil % x     / BANDS % x        10-30    144  |  102  |  18  ----------------------------<  146<H>  3.6   |  26  |  0.86  10-29    137  |  101  |  24<H>  ----------------------------<  179<H>  4.1   |  27  |  1.06    Ca    8.5      30 Oct 2023 06:22  Ca    9.1      29 Oct 2023 05:54  Phos  2.2     10-30  Mg     1.70     10-30            Urinalysis Basic - ( 30 Oct 2023 06:22 )    Color: x / Appearance: x / SG: x / pH: x  Gluc: 146 mg/dL / Ketone: x  / Bili: x / Urobili: x   Blood: x / Protein: x / Nitrite: x   Leuk Esterase: x / RBC: x / WBC x   Sq Epi: x / Non Sq Epi: x / Bacteria: x          CAPILLARY BLOOD GLUCOSE      POCT Blood Glucose.: 182 mg/dL (30 Oct 2023 21:34)  POCT Blood Glucose.: 112 mg/dL (30 Oct 2023 17:46)  POCT Blood Glucose.: 133 mg/dL (30 Oct 2023 12:20)  POCT Blood Glucose.: 132 mg/dL (30 Oct 2023 08:25)        RADIOLOGY & ADDITIONAL TESTS:  TTE W or WO Ultrasound Enhancing Agent (10.30.23 @ 16:01) >  CONCLUSIONS:      1. Left ventricular cavity is normal. Left ventricular wall thickness is normal. Left ventricular systolic function is normal with an ejection fraction of 62 % by Puentes's method of disks.   2. Normal right ventricular cavity size and systolic function.   3. Structurally normal mitral valve with normal leaflet excursion.   4. Normal atria.   5. Ascending aorta diameter is dilated, measuring 4.10 cm (indexed 2.05 cm/m²).   6. No pericardial effusion seen.

## 2023-10-31 NOTE — DISCHARGE NOTE NURSING/CASE MANAGEMENT/SOCIAL WORK - PATIENT PORTAL LINK FT
You can access the FollowMyHealth Patient Portal offered by [facility] by registering at the following website: [email or web address]. By joining [employer]’s FollowMyHealth portal, you will also be able to view your health information using other applications (apps) compatible with our system.

## 2023-10-31 NOTE — PROGRESS NOTE ADULT - PROBLEM SELECTOR PROBLEM 5
Need for prophylactic measure
HTN (hypertension)
Need for prophylactic measure

## 2023-11-01 ENCOUNTER — NON-APPOINTMENT (OUTPATIENT)
Age: 68
End: 2023-11-01

## 2023-11-01 LAB
CULTURE RESULTS: ABNORMAL
SPECIMEN SOURCE: SIGNIFICANT CHANGE UP

## 2023-11-03 ENCOUNTER — LABORATORY RESULT (OUTPATIENT)
Age: 68
End: 2023-11-03

## 2023-11-06 ENCOUNTER — APPOINTMENT (OUTPATIENT)
Dept: THORACIC SURGERY | Facility: CLINIC | Age: 68
End: 2023-11-06
Payer: COMMERCIAL

## 2023-11-06 VITALS
RESPIRATION RATE: 18 BRPM | BODY MASS INDEX: 30.76 KG/M2 | OXYGEN SATURATION: 96 % | HEART RATE: 96 BPM | SYSTOLIC BLOOD PRESSURE: 135 MMHG | WEIGHT: 196 LBS | HEIGHT: 67 IN | TEMPERATURE: 98.5 F | DIASTOLIC BLOOD PRESSURE: 93 MMHG

## 2023-11-06 DIAGNOSIS — R59.0 LOCALIZED ENLARGED LYMPH NODES: ICD-10-CM

## 2023-11-06 DIAGNOSIS — R31.0 GROSS HEMATURIA: ICD-10-CM

## 2023-11-06 DIAGNOSIS — Z00.00 ENCOUNTER FOR GENERAL ADULT MEDICAL EXAMINATION WITHOUT ABNORMAL FINDINGS: ICD-10-CM

## 2023-11-06 DIAGNOSIS — J02.9 ACUTE PHARYNGITIS, UNSPECIFIED: ICD-10-CM

## 2023-11-06 DIAGNOSIS — H91.90 UNSPECIFIED HEARING LOSS, UNSPECIFIED EAR: ICD-10-CM

## 2023-11-06 DIAGNOSIS — E11.9 TYPE 2 DIABETES MELLITUS WITHOUT COMPLICATIONS: ICD-10-CM

## 2023-11-06 PROCEDURE — 99024 POSTOP FOLLOW-UP VISIT: CPT

## 2023-11-07 ENCOUNTER — NON-APPOINTMENT (OUTPATIENT)
Age: 68
End: 2023-11-07

## 2023-11-08 LAB
SURGICAL PATHOLOGY STUDY: SIGNIFICANT CHANGE UP
SURGICAL PATHOLOGY STUDY: SIGNIFICANT CHANGE UP

## 2023-11-10 ENCOUNTER — LABORATORY RESULT (OUTPATIENT)
Age: 68
End: 2023-11-10

## 2023-11-13 ENCOUNTER — APPOINTMENT (OUTPATIENT)
Dept: OTOLARYNGOLOGY | Facility: CLINIC | Age: 68
End: 2023-11-13
Payer: COMMERCIAL

## 2023-11-13 ENCOUNTER — APPOINTMENT (OUTPATIENT)
Dept: THORACIC SURGERY | Facility: CLINIC | Age: 68
End: 2023-11-13
Payer: COMMERCIAL

## 2023-11-13 VITALS
HEART RATE: 94 BPM | RESPIRATION RATE: 18 BRPM | BODY MASS INDEX: 30.76 KG/M2 | HEIGHT: 67 IN | SYSTOLIC BLOOD PRESSURE: 137 MMHG | OXYGEN SATURATION: 94 % | WEIGHT: 196 LBS | DIASTOLIC BLOOD PRESSURE: 99 MMHG

## 2023-11-13 DIAGNOSIS — E04.1 NONTOXIC SINGLE THYROID NODULE: ICD-10-CM

## 2023-11-13 PROCEDURE — 99024 POSTOP FOLLOW-UP VISIT: CPT

## 2023-11-13 PROCEDURE — 31575 DIAGNOSTIC LARYNGOSCOPY: CPT

## 2023-11-13 PROCEDURE — 99204 OFFICE O/P NEW MOD 45 MIN: CPT | Mod: 25

## 2023-11-16 ENCOUNTER — APPOINTMENT (OUTPATIENT)
Dept: OTOLARYNGOLOGY | Facility: CLINIC | Age: 68
End: 2023-11-16

## 2023-11-17 ENCOUNTER — LABORATORY RESULT (OUTPATIENT)
Age: 68
End: 2023-11-17

## 2023-11-24 ENCOUNTER — LABORATORY RESULT (OUTPATIENT)
Age: 68
End: 2023-11-24

## 2023-11-25 LAB
CULTURE RESULTS: SIGNIFICANT CHANGE UP
SPECIMEN SOURCE: SIGNIFICANT CHANGE UP

## 2023-12-01 ENCOUNTER — LABORATORY RESULT (OUTPATIENT)
Age: 68
End: 2023-12-01

## 2023-12-04 ENCOUNTER — APPOINTMENT (OUTPATIENT)
Dept: UROLOGY | Facility: CLINIC | Age: 68
End: 2023-12-04
Payer: COMMERCIAL

## 2023-12-04 ENCOUNTER — OUTPATIENT (OUTPATIENT)
Dept: OUTPATIENT SERVICES | Facility: HOSPITAL | Age: 68
LOS: 1 days | End: 2023-12-04
Payer: COMMERCIAL

## 2023-12-04 ENCOUNTER — APPOINTMENT (OUTPATIENT)
Dept: THORACIC SURGERY | Facility: CLINIC | Age: 68
End: 2023-12-04
Payer: COMMERCIAL

## 2023-12-04 VITALS
SYSTOLIC BLOOD PRESSURE: 116 MMHG | HEART RATE: 107 BPM | HEIGHT: 67 IN | OXYGEN SATURATION: 94 % | RESPIRATION RATE: 16 BRPM | BODY MASS INDEX: 30.13 KG/M2 | WEIGHT: 192 LBS | DIASTOLIC BLOOD PRESSURE: 80 MMHG

## 2023-12-04 VITALS
SYSTOLIC BLOOD PRESSURE: 138 MMHG | DIASTOLIC BLOOD PRESSURE: 91 MMHG | TEMPERATURE: 97.2 F | RESPIRATION RATE: 16 BRPM | HEART RATE: 116 BPM

## 2023-12-04 DIAGNOSIS — M86.9 OSTEOMYELITIS, UNSPECIFIED: ICD-10-CM

## 2023-12-04 DIAGNOSIS — R31.0 GROSS HEMATURIA: ICD-10-CM

## 2023-12-04 DIAGNOSIS — R35.0 FREQUENCY OF MICTURITION: ICD-10-CM

## 2023-12-04 PROCEDURE — 52000 CYSTOURETHROSCOPY: CPT

## 2023-12-04 PROCEDURE — 99024 POSTOP FOLLOW-UP VISIT: CPT

## 2023-12-05 DIAGNOSIS — H91.90 UNSPECIFIED HEARING LOSS, UNSPECIFIED EAR: ICD-10-CM

## 2023-12-05 DIAGNOSIS — R31.0 GROSS HEMATURIA: ICD-10-CM

## 2023-12-05 LAB — URINE CYTOLOGY: NORMAL

## 2023-12-11 ENCOUNTER — LABORATORY RESULT (OUTPATIENT)
Age: 68
End: 2023-12-11

## 2023-12-13 LAB
CULTURE RESULTS: SIGNIFICANT CHANGE UP
SPECIMEN SOURCE: SIGNIFICANT CHANGE UP

## 2023-12-20 ENCOUNTER — RX RENEWAL (OUTPATIENT)
Age: 68
End: 2023-12-20

## 2023-12-22 ENCOUNTER — APPOINTMENT (OUTPATIENT)
Dept: ULTRASOUND IMAGING | Facility: IMAGING CENTER | Age: 68
End: 2023-12-22
Payer: COMMERCIAL

## 2023-12-22 ENCOUNTER — OUTPATIENT (OUTPATIENT)
Dept: OUTPATIENT SERVICES | Facility: HOSPITAL | Age: 68
LOS: 1 days | End: 2023-12-22
Payer: COMMERCIAL

## 2023-12-22 DIAGNOSIS — Z00.8 ENCOUNTER FOR OTHER GENERAL EXAMINATION: ICD-10-CM

## 2023-12-22 PROCEDURE — 76536 US EXAM OF HEAD AND NECK: CPT

## 2023-12-22 PROCEDURE — 76536 US EXAM OF HEAD AND NECK: CPT | Mod: 26

## 2023-12-23 ENCOUNTER — LABORATORY RESULT (OUTPATIENT)
Age: 68
End: 2023-12-23

## 2023-12-27 ENCOUNTER — APPOINTMENT (OUTPATIENT)
Dept: OTOLARYNGOLOGY | Facility: CLINIC | Age: 68
End: 2023-12-27
Payer: COMMERCIAL

## 2023-12-27 VITALS
DIASTOLIC BLOOD PRESSURE: 75 MMHG | HEIGHT: 67 IN | OXYGEN SATURATION: 97 % | SYSTOLIC BLOOD PRESSURE: 113 MMHG | WEIGHT: 192 LBS | HEART RATE: 79 BPM | BODY MASS INDEX: 30.13 KG/M2

## 2023-12-27 PROCEDURE — 99214 OFFICE O/P EST MOD 30 MIN: CPT | Mod: 25

## 2023-12-27 PROCEDURE — 31575 DIAGNOSTIC LARYNGOSCOPY: CPT

## 2023-12-27 RX ORDER — FLUTICASONE PROPIONATE 50 UG/1
50 SPRAY, METERED NASAL DAILY
Qty: 1 | Refills: 0 | Status: ACTIVE | COMMUNITY
Start: 2023-12-27 | End: 1900-01-01

## 2023-12-27 RX ORDER — OMEPRAZOLE 40 MG/1
40 CAPSULE, DELAYED RELEASE ORAL DAILY
Qty: 30 | Refills: 3 | Status: ACTIVE | COMMUNITY
Start: 2023-12-27 | End: 1900-01-01

## 2023-12-28 NOTE — HISTORY OF PRESENT ILLNESS
[de-identified] : 68 year old male presents today for initial evaluation of Thyroid nodules and Asymmetric thickening of the right aryepiglottic fold found on recent hospital admission. recent thyroid US demonstrated multiple benign appearing nodules bilaterally TI-RAD1.  Pt reports coughing daily and is bothersome, and unable to tolerated drinking cold beverages  without coughing  Currently taking omeprazole and Pepcid for LPR,  Never a smoker and mild social ETOH use    Pt denies dysphonia, dysphagia, dyspnea or pain. Eating drinking well. No unintentional weight loss. Denies night sweats.  No family history of thyroid disease.  No known radiation exposure.  He is currently s/p Right sternoclavicular joint debridement, resection of clavicular head and washout, and advancement flap of the pectoralis muscle on 10/27/23. Has REYNA drainage.   IMPRESSION: 1. Low-density soft tissue in the right lower neck extending into the chest, suspected to be related to sternoclavicular infection, described in more detail on separately dictated chest CT of the same day. 2. Asymmetric thickening of the right aryepiglottic fold. Underlying mass is not excluded. Follow-up with ENT and direct visualization with scope is recommended. 3. Bilateral thyroid nodules measuring up to 1.7 cm on the left. Correlation with nonemergent follow-up ultrasound is recommended.

## 2023-12-28 NOTE — ASSESSMENT
[FreeTextEntry1] : 68 year old male presents today for initial evaluation of bilateral thyroid nodules and Asymmetric thickening of the right aryepiglottic fold found on recent hospital admission.   -Imaging reviewed in depth with patient---reviewed thyroid US---Multiple bilateral benign-appearing thyroid nodules. There are no suspicious lesions. TI-RAD 1: Benign (No FNA) -Fiberoptic exam of larynx wnl, mild reflux related changes     -Continue to take omeprazole before meals and Pepcid before bed  --Educated on diet modifications to minimize burning with swallowing, decrease or eliminate acidic foods -Follow up in 3 months or sooner if any issues before  -He is in agreement with this plan.

## 2024-02-02 ENCOUNTER — APPOINTMENT (OUTPATIENT)
Dept: INTERNAL MEDICINE | Facility: CLINIC | Age: 69
End: 2024-02-02
Payer: MEDICARE

## 2024-02-02 ENCOUNTER — OUTPATIENT (OUTPATIENT)
Dept: OUTPATIENT SERVICES | Facility: HOSPITAL | Age: 69
LOS: 1 days | End: 2024-02-02
Payer: SELF-PAY

## 2024-02-02 VITALS
OXYGEN SATURATION: 94 % | WEIGHT: 194 LBS | DIASTOLIC BLOOD PRESSURE: 70 MMHG | HEART RATE: 83 BPM | SYSTOLIC BLOOD PRESSURE: 110 MMHG | HEIGHT: 67 IN | BODY MASS INDEX: 30.45 KG/M2

## 2024-02-02 DIAGNOSIS — I10 ESSENTIAL (PRIMARY) HYPERTENSION: ICD-10-CM

## 2024-02-02 DIAGNOSIS — R97.20 ELEVATED PROSTATE, SPECIFIC ANTIGEN [PSA]: ICD-10-CM

## 2024-02-02 DIAGNOSIS — E11.9 TYPE 2 DIABETES MELLITUS W/OUT COMPLICATIONS: ICD-10-CM

## 2024-02-02 DIAGNOSIS — E55.9 VITAMIN D DEFICIENCY, UNSPECIFIED: ICD-10-CM

## 2024-02-02 PROCEDURE — G0008: CPT

## 2024-02-02 PROCEDURE — G0439: CPT

## 2024-02-02 PROCEDURE — 90662 IIV NO PRSV INCREASED AG IM: CPT

## 2024-02-04 PROBLEM — R97.20 ELEVATED PSA: Status: ACTIVE | Noted: 2021-04-16

## 2024-02-04 PROBLEM — I10 HYPERTENSION, ESSENTIAL: Status: ACTIVE | Noted: 2021-01-11

## 2024-02-04 PROBLEM — E55.9 VITAMIN D DEFICIENCY: Status: ACTIVE | Noted: 2024-02-02

## 2024-02-04 PROBLEM — E11.9 DIABETES MELLITUS: Status: ACTIVE | Noted: 2021-01-11

## 2024-02-04 LAB
25(OH)D3 SERPL-MCNC: 19.4 NG/ML
ALBUMIN SERPL ELPH-MCNC: 4.5 G/DL
ALP BLD-CCNC: 60 U/L
ALT SERPL-CCNC: 21 U/L
ANION GAP SERPL CALC-SCNC: 9 MMOL/L
APPEARANCE: CLEAR
AST SERPL-CCNC: 16 U/L
BACTERIA: NEGATIVE /HPF
BILIRUB SERPL-MCNC: 0.5 MG/DL
BILIRUBIN URINE: NEGATIVE
BLOOD URINE: NEGATIVE
BUN SERPL-MCNC: 11 MG/DL
CALCIUM SERPL-MCNC: 10 MG/DL
CAST: 0 /LPF
CHLORIDE SERPL-SCNC: 100 MMOL/L
CHOLEST SERPL-MCNC: 113 MG/DL
CO2 SERPL-SCNC: 29 MMOL/L
COLOR: YELLOW
CREAT SERPL-MCNC: 1.1 MG/DL
CREAT SPEC-SCNC: 131 MG/DL
EGFR: 74 ML/MIN/1.73M2
EPITHELIAL CELLS: 0 /HPF
ESTIMATED AVERAGE GLUCOSE: 174 MG/DL
GLUCOSE QUALITATIVE U: >=1000 MG/DL
GLUCOSE SERPL-MCNC: 102 MG/DL
HBA1C MFR BLD HPLC: 7.7 %
HCT VFR BLD CALC: 46.5 %
HDLC SERPL-MCNC: 50 MG/DL
HGB BLD-MCNC: 14.9 G/DL
KETONES URINE: NEGATIVE MG/DL
LDLC SERPL CALC-MCNC: 46 MG/DL
LEUKOCYTE ESTERASE URINE: NEGATIVE
MCHC RBC-ENTMCNC: 28 PG
MCHC RBC-ENTMCNC: 32 GM/DL
MCV RBC AUTO: 87.4 FL
MICROALBUMIN 24H UR DL<=1MG/L-MCNC: <1.2 MG/DL
MICROALBUMIN/CREAT 24H UR-RTO: NORMAL MG/G
MICROSCOPIC-UA: NORMAL
NITRITE URINE: NEGATIVE
NONHDLC SERPL-MCNC: 63 MG/DL
PH URINE: 6
PLATELET # BLD AUTO: 252 K/UL
POTASSIUM SERPL-SCNC: 4.7 MMOL/L
PROT SERPL-MCNC: 7.6 G/DL
PROTEIN URINE: NEGATIVE MG/DL
PSA FREE FLD-MCNC: 23 %
PSA FREE SERPL-MCNC: 0.85 NG/ML
PSA SERPL-MCNC: 3.73 NG/ML
RBC # BLD: 5.32 M/UL
RBC # FLD: 15.9 %
RED BLOOD CELLS URINE: 1 /HPF
SODIUM SERPL-SCNC: 138 MMOL/L
SPECIFIC GRAVITY URINE: >1.03
TRIGL SERPL-MCNC: 86 MG/DL
TSH SERPL-ACNC: 1.31 UIU/ML
UROBILINOGEN URINE: 0.2 MG/DL
VIT B12 SERPL-MCNC: 499 PG/ML
WBC # FLD AUTO: 9.72 K/UL
WHITE BLOOD CELLS URINE: 0 /HPF

## 2024-02-04 NOTE — HISTORY OF PRESENT ILLNESS
[de-identified] : 68-year-old man with history of diabetes mellitus, hypertension, hyperlipidemia and elevated PSA with bilateral hydrocele here for his annual wellness visit. History of gross hematuria, frequency and flank pain History of hydroceles right larger than left, treated for orchitis with 1 week of Bactrim in February 2023. Ultrasound of the testicle shows moderate hydrocele which is reduced.  History of elevated PSA with MRI of the prostate January 2023 with no suspicious lesions DM-A1c down to 7.6% on metformin, glipizide, and Jardiance History of hypertension on an ACE inhibitor

## 2024-02-04 NOTE — PHYSICAL EXAM
[No Acute Distress] : no acute distress [Well Nourished] : well nourished [Well Developed] : well developed [Well-Appearing] : well-appearing [Normal Sclera/Conjunctiva] : normal sclera/conjunctiva [PERRL] : pupils equal round and reactive to light [EOMI] : extraocular movements intact [Normal Outer Ear/Nose] : the outer ears and nose were normal in appearance [Normal Oropharynx] : the oropharynx was normal [No JVD] : no jugular venous distention [No Lymphadenopathy] : no lymphadenopathy [Supple] : supple [Thyroid Normal, No Nodules] : the thyroid was normal and there were no nodules present [No Respiratory Distress] : no respiratory distress  [No Accessory Muscle Use] : no accessory muscle use [Clear to Auscultation] : lungs were clear to auscultation bilaterally [Normal Rate] : normal rate  [Regular Rhythm] : with a regular rhythm [Normal S1, S2] : normal S1 and S2 [No Carotid Bruits] : no carotid bruits [No Murmur] : no murmur heard [No Abdominal Bruit] : a ~M bruit was not heard ~T in the abdomen [No Varicosities] : no varicosities [Pedal Pulses Present] : the pedal pulses are present [No Edema] : there was no peripheral edema [No Palpable Aorta] : no palpable aorta [Normal Appearance] : normal in appearance [No Extremity Clubbing/Cyanosis] : no extremity clubbing/cyanosis [Soft] : abdomen soft [Non Tender] : non-tender [Non-distended] : non-distended [No Masses] : no abdominal mass palpated [No HSM] : no HSM [Normal Bowel Sounds] : normal bowel sounds [Normal Posterior Cervical Nodes] : no posterior cervical lymphadenopathy [Normal Anterior Cervical Nodes] : no anterior cervical lymphadenopathy [No CVA Tenderness] : no CVA  tenderness [No Spinal Tenderness] : no spinal tenderness [No Joint Swelling] : no joint swelling [Grossly Normal Strength/Tone] : grossly normal strength/tone [No Rash] : no rash [Coordination Grossly Intact] : coordination grossly intact [No Focal Deficits] : no focal deficits [Normal Gait] : normal gait [Deep Tendon Reflexes (DTR)] : deep tendon reflexes were 2+ and symmetric [Normal Affect] : the affect was normal [Normal Insight/Judgement] : insight and judgment were intact

## 2024-02-15 DIAGNOSIS — Z23 ENCOUNTER FOR IMMUNIZATION: ICD-10-CM

## 2024-02-15 DIAGNOSIS — R97.20 ELEVATED PROSTATE SPECIFIC ANTIGEN [PSA]: ICD-10-CM

## 2024-02-15 DIAGNOSIS — E11.9 TYPE 2 DIABETES MELLITUS WITHOUT COMPLICATIONS: ICD-10-CM

## 2024-02-15 DIAGNOSIS — Z00.00 ENCOUNTER FOR GENERAL ADULT MEDICAL EXAMINATION WITHOUT ABNORMAL FINDINGS: ICD-10-CM

## 2024-02-15 DIAGNOSIS — E55.9 VITAMIN D DEFICIENCY, UNSPECIFIED: ICD-10-CM

## 2024-03-05 ENCOUNTER — APPOINTMENT (OUTPATIENT)
Dept: INTERNAL MEDICINE | Facility: CLINIC | Age: 69
End: 2024-03-05

## 2024-03-11 ENCOUNTER — APPOINTMENT (OUTPATIENT)
Dept: OTOLARYNGOLOGY | Facility: CLINIC | Age: 69
End: 2024-03-11
Payer: MEDICARE

## 2024-03-11 VITALS
OXYGEN SATURATION: 95 % | BODY MASS INDEX: 30.45 KG/M2 | HEIGHT: 67 IN | HEART RATE: 89 BPM | SYSTOLIC BLOOD PRESSURE: 126 MMHG | WEIGHT: 194 LBS | DIASTOLIC BLOOD PRESSURE: 83 MMHG

## 2024-03-11 PROCEDURE — 31575 DIAGNOSTIC LARYNGOSCOPY: CPT

## 2024-03-11 PROCEDURE — 99214 OFFICE O/P EST MOD 30 MIN: CPT | Mod: 25

## 2024-03-11 RX ADMIN — FAMOTIDINE 1 MG: 20 TABLET, FILM COATED ORAL at 00:00

## 2024-03-11 NOTE — ASSESSMENT
[FreeTextEntry1] : 68 year old male presents today for initial evaluation of bilateral thyroid nodules and Asymmetric thickening of the right aryepiglottic fold found on recent hospital admission.   -Imaging reviewed in depth with patient---reviewed thyroid US---Multiple bilateral benign-appearing thyroid nodules. There are no suspicious lesions. TI-RAD 1: Benign (No FNA) --Fiberoptic exam of larynx wnl, mild reflux related changes , will add famotidine  -Will obtain surveillance us December     -Continue to take omeprazole before meals and Pepcid before bed  --Educated on diet modifications to minimize burning with swallowing, decrease or eliminate acidic foods -Follow up in 6 months or sooner if any issues before  -He is in agreement with this plan.

## 2024-03-11 NOTE — HISTORY OF PRESENT ILLNESS
[de-identified] : 68 year old male presents today for follow up of Thyroid nodules and Asymmetric thickening of the right aryepiglottic fold found on hospital admission in October 2023. His most recent thyroid US demonstrated multiple benign appearing nodules bilaterally TI-RAD1.  Pt reports coughing daily and is bothersome, and unable to tolerated drinking cold beverages without coughing  Currently taking omeprazole and Pepcid for LPR,  Never a smoker and mild social ETOH use  Pt denies dysphonia, dysphagia, dyspnea or pain. Eating drinking well. No unintentional weight loss. Denies night sweats.  No family history of thyroid disease.  No known radiation exposure.  He is currently s/p Right sternoclavicular joint debridement, resection of clavicular head and washout, and advancement flap of the pectoralis muscle on 10/27/23. Has REYNA drainage.   IMPRESSION: 1. Low-density soft tissue in the right lower neck extending into the chest, suspected to be related to sternoclavicular infection, described in more detail on separately dictated chest CT of the same day. 2. Asymmetric thickening of the right aryepiglottic fold. Underlying mass is not excluded. Follow-up with ENT and direct visualization with scope is recommended. 3. Bilateral thyroid nodules measuring up to 1.7 cm on the left. Correlation with nonemergent follow-up ultrasound is recommended.

## 2024-03-25 ENCOUNTER — APPOINTMENT (OUTPATIENT)
Dept: OTOLARYNGOLOGY | Facility: CLINIC | Age: 69
End: 2024-03-25

## 2024-04-22 ENCOUNTER — RX RENEWAL (OUTPATIENT)
Age: 69
End: 2024-04-22

## 2024-04-22 RX ORDER — OMEPRAZOLE 20 MG/1
20 CAPSULE, DELAYED RELEASE ORAL DAILY
Qty: 90 | Refills: 0 | Status: ACTIVE | COMMUNITY
Start: 2020-11-03 | End: 1900-01-01

## 2024-04-22 RX ORDER — MELOXICAM 7.5 MG/1
7.5 TABLET ORAL
Qty: 30 | Refills: 3 | Status: ACTIVE | COMMUNITY
Start: 2023-10-23 | End: 1900-01-01

## 2024-05-16 ENCOUNTER — APPOINTMENT (OUTPATIENT)
Dept: UROLOGY | Facility: CLINIC | Age: 69
End: 2024-05-16
Payer: MEDICARE

## 2024-05-16 DIAGNOSIS — N48.6 INDURATION PENIS PLASTICA: ICD-10-CM

## 2024-05-16 PROCEDURE — G2211 COMPLEX E/M VISIT ADD ON: CPT

## 2024-05-16 PROCEDURE — 99214 OFFICE O/P EST MOD 30 MIN: CPT

## 2024-05-16 RX ORDER — SILDENAFIL 50 MG/1
50 TABLET ORAL
Qty: 9 | Refills: 5 | Status: ACTIVE | COMMUNITY
Start: 2022-04-12 | End: 1900-01-01

## 2024-05-17 LAB
APPEARANCE: CLEAR
BACTERIA: NEGATIVE /HPF
BILIRUBIN URINE: NEGATIVE
BLOOD URINE: NEGATIVE
CAST: 0 /LPF
COLOR: YELLOW
EPITHELIAL CELLS: 0 /HPF
GLUCOSE QUALITATIVE U: NEGATIVE MG/DL
KETONES URINE: NEGATIVE MG/DL
LEUKOCYTE ESTERASE URINE: NEGATIVE
MICROSCOPIC-UA: NORMAL
NITRITE URINE: NEGATIVE
PH URINE: 6.5
PROTEIN URINE: NEGATIVE MG/DL
RED BLOOD CELLS URINE: 1 /HPF
SPECIFIC GRAVITY URINE: 1.02
UROBILINOGEN URINE: 0.2 MG/DL
WHITE BLOOD CELLS URINE: 0 /HPF

## 2024-05-18 LAB — BACTERIA UR CULT: NORMAL

## 2024-05-18 NOTE — ADDENDUM
[FreeTextEntry1] : Entered by Tae Ro, acting as scribe for Dr. Diego Vicente. The documentation recorded by the scribe accurately reflects the service I personally performed and the decisions made by me.

## 2024-05-18 NOTE — LETTER BODY
[FreeTextEntry1] : Cecilia Bragg MD  [address]  [place], [place] [id number] [phone]    Dear Dr. Bragg,    Reason for Visit: Previous right testicular pain. Right epididymal orchitis. Right hydrocele. Elevated PSA.    This is a 69 year old male with previous right testicular pain, elevated PSA and erectile dysfunction. Patient has a history of poorly controlled diabetes. He previously went to the emergency room for testicular pain. He has right epididymo orchitis. He previously completed his course of Levaquin. His previous prostate MRI was negative. He finished his course of Bactrim. His previous urine culture was negative for infection. He is here today for a follow up. His testicular pain and orchitis has improved. He has a reactive hydrocele. Patient has elevated PSA. Repeat PSA today is 3.73. Patient had previous prostate MRI in January. MRI demonstrated PI-RADS 2 lesion. Previous prostate MRI demonstrated PI-RADS 2. Patient reports taking Viagra as needed for his erectile dysfunction. Patient now reports penile curvature.  He reports curvature to the left.  He states that interferes with sexual intercourse.  Patient denies any gross hematuria or dysuria or urinary incontinence. The patient denies any aggravating or relieving factors. The patient denies any interference of function. All other review of systems are negative. He has cancer in his family medical history. He has no previous surgical history. Past medical history, family history and social history were inquired and were noncontributory to current condition. The patient does not use tobacco or drink alcohol. Medications and allergies were reviewed. He has no known allergies to medication.    On examination, the patient is a well-appearing male in no acute distress. He is alert and oriented follows commands. He has normal mood and affect. He is normocephalic. Oral no thrush. Neck is supple. Respirations are unlabored. His abdomen is soft and nontender. Liver is nonpalpable. Bladder is nonpalpable. No CVA tenderness. Neurologically he is grossly intact. No peripheral edema. Skin without gross abnormality.  Patient has a small Peyronie's plaque on the dorsal aspect of the phallus.   His PSA today was 3.73.    Assessment: Right testicular pain. Elevated PSA. Poor glycemic control. Hydrocele.  Peyronie's disease.    I counseled the patient. In terms of his elevated PSA, patient had PSA testing done today. His recent PSA improved. I reassured patient. In terms of his erectile dysfunction, I recommended patient continues taking Viagra as needed. He also has penile curvature. I recommended patient see Dr. Epifanio Mendoza for Peyronie's disease. The risks and benefits were discussed. I answered the patient questions. The patient will follow-up as directed and will contact me with any questions or concerns. Thank you for the opportunity to participate in the care of this patient. I'll keep you updated on his progress.  Patient will continue longitudinal care for their complex and chronic condition.  Plan: Viagra PRN. See Dr. Epifanio Mendoza. Follow-up as directed.

## 2024-05-18 NOTE — HISTORY OF PRESENT ILLNESS
[FreeTextEntry1] : Follow-up with PSA.  PSA today was 3.73.  PSA improved.  Reassured patient.  Follow-up erectile dysfunction..  Continue Viagra.  Patient reports erectile dysfunction.  Patient also reports penile curvature.  See Dr. Epifanio Mendoza for Peyronie's disease.  Please refer to URO Consult note

## 2024-06-03 RX ORDER — METFORMIN HYDROCHLORIDE 1000 MG/1
1000 TABLET, COATED ORAL
Qty: 180 | Refills: 3 | Status: ACTIVE | COMMUNITY
Start: 2020-11-03 | End: 1900-01-01

## 2024-06-03 RX ORDER — ENALAPRIL MALEATE 10 MG/1
10 TABLET ORAL DAILY
Qty: 90 | Refills: 3 | Status: ACTIVE | COMMUNITY
Start: 2020-11-03 | End: 1900-01-01

## 2024-06-03 RX ORDER — FAMOTIDINE 20 MG/1
20 TABLET, FILM COATED ORAL
Qty: 30 | Refills: 2 | Status: ACTIVE | COMMUNITY
Start: 2023-12-21 | End: 1900-01-01

## 2024-06-03 RX ORDER — EMPAGLIFLOZIN 25 MG/1
25 TABLET, FILM COATED ORAL
Qty: 90 | Refills: 3 | Status: ACTIVE | COMMUNITY
Start: 2021-10-19 | End: 1900-01-01

## 2024-06-03 RX ORDER — ATORVASTATIN CALCIUM 40 MG/1
40 TABLET, FILM COATED ORAL
Qty: 90 | Refills: 3 | Status: ACTIVE | COMMUNITY
Start: 2020-11-03 | End: 1900-01-01

## 2024-06-06 ENCOUNTER — APPOINTMENT (OUTPATIENT)
Dept: UROLOGY | Facility: CLINIC | Age: 69
End: 2024-06-06
Payer: MEDICARE

## 2024-06-06 PROCEDURE — 99213 OFFICE O/P EST LOW 20 MIN: CPT

## 2024-06-06 PROCEDURE — G2211 COMPLEX E/M VISIT ADD ON: CPT

## 2024-06-06 NOTE — HISTORY OF PRESENT ILLNESS
[FreeTextEntry1] : Follow-up rectal dysfunction.  Continue Viagra. Patient scheduled appt to see Dr. Mendoza for Peyronie's disease  His PSA is 3.73.  Follow-up 1 year.  I spent 20-minutes time today on all issues related to this patient on today date of service including non face to face time.  Please refer to URO Consult Note.

## 2024-06-06 NOTE — ADDENDUM
[FreeTextEntry1] : Entered by Vinh Love, acting as scribe for Dr. Diego Vicente. The documentation recorded by the scribe accurately reflects the service I personally performed and the decisions made by me.

## 2024-06-06 NOTE — LETTER BODY
[FreeTextEntry1] : Evie Narvaez  [address] [facility] [address], [place] [address] [phone]   Dear Dr. Narvaez,    Reason for Visit: Previous right testicular pain. Right epididymal orchitis. Right hydrocele. Elevated PSA. Erectile dysfunction.    This is a 69 year old male with previous right testicular pain, elevated PSA and erectile dysfunction. Patient recently reports penile curvature. Patient has a small Peyrione's plaque on the phallus. Patient has a history of poorly controlled diabetes. He previously went to the emergency room for testicular pain. He has right epididymo orchitis. He previously completed his course of Levaquin. His previous prostate MRI was negative. He finished his course of Bactrim. His previous urine culture was negative for infection. He has a reactive hydrocele. Patient had previous prostate MRI in January. MRI demonstrated PI-RADS 2 lesion. Previous prostate MRI demonstrated PI-RADS 2. He is here today for a follow up. Since he was last seen, patient reports taking Viagra PRN.  Patient reports taking the medications regularly without any side effects or difficulties with the medication. Patient denies any gross hematuria or dysuria or urinary incontinence. The patient denies any aggravating or relieving factors. The patient denies any interference of function. All other review of systems are negative. He has cancer in his family medical history. He has no previous surgical history. Past medical history, family history and social history were inquired and were noncontributory to current condition. The patient does not use tobacco or drink alcohol. Medications and allergies were reviewed. He has no known allergies to medication.    On examination, the patient is a well-appearing male in no acute distress. He is alert and oriented follows commands. He has normal mood and affect. He is normocephalic. Oral no thrush. Neck is supple. Respirations are unlabored. His abdomen is soft and nontender. Liver is nonpalpable. Bladder is nonpalpable. No CVA tenderness. Neurologically he is grossly intact. No peripheral edema. Skin without gross abnormality. Patient has a small Peyronie's plaque on the dorsal aspect of the phallus.   His recent PSA was 3.73, WNL.    Assessment: Right testicular pain. Elevated PSA. Poor glycemic control. Hydrocele. Peyronie's disease. Erectile dysfunction.    I counseled the patient. In terms of his elevated PSA, his recent PSA was 3.73, which is normal. His recent PSA improved. I reassured patient. In terms of his erectile dysfunction, I recommended patient continues taking Viagra as needed. In terms of his Peyronie's disease, the patient scheduled an appointment to see Dr. Epifanio Mendoza. The risks and benefits were discussed. I answered the patient questions. The patient will follow-up as directed and will contact me with any questions or concerns. Thank you for the opportunity to participate in the care of this patient. I'll keep you updated on his progress.  Patient will continue longitudinal care for their complex and chronic condition.  Plan: Continue Viagra PRN. See Dr. Epifanio Mendoza. Follow-up in 1 year.  I spent 20-minutes time today on all issues related to this patient on today date of service including non face to face time.

## 2024-06-12 RX ORDER — FINASTERIDE 5 MG/1
5 TABLET, FILM COATED ORAL DAILY
Qty: 90 | Refills: 3 | Status: ACTIVE | COMMUNITY
Start: 2023-12-04 | End: 1900-01-01

## 2024-06-17 RX ORDER — GLIPIZIDE 5 MG/1
5 TABLET ORAL TWICE DAILY
Qty: 180 | Refills: 3 | Status: ACTIVE | COMMUNITY
Start: 2023-01-13 | End: 1900-01-01

## 2024-07-10 DIAGNOSIS — N48.6 INDURATION PENIS PLASTICA: ICD-10-CM

## 2024-07-11 ENCOUNTER — APPOINTMENT (OUTPATIENT)
Dept: UROLOGY | Facility: CLINIC | Age: 69
End: 2024-07-11
Payer: MEDICARE

## 2024-07-11 VITALS
SYSTOLIC BLOOD PRESSURE: 123 MMHG | BODY MASS INDEX: 31.39 KG/M2 | HEIGHT: 67 IN | DIASTOLIC BLOOD PRESSURE: 84 MMHG | HEART RATE: 80 BPM | WEIGHT: 200 LBS

## 2024-07-11 PROCEDURE — 99215 OFFICE O/P EST HI 40 MIN: CPT

## 2024-07-11 PROCEDURE — G2211 COMPLEX E/M VISIT ADD ON: CPT

## 2024-07-11 RX ORDER — PAPAVERINE HYDROCHLORIDE 30 MG/ML
30 INJECTION, SOLUTION INTRAVENOUS
Qty: 2 | Refills: 0 | Status: ACTIVE | COMMUNITY
Start: 2024-07-11 | End: 1900-01-01

## 2024-07-12 LAB
ALBUMIN SERPL ELPH-MCNC: 4.7 G/DL
ALP BLD-CCNC: 61 U/L
ALT SERPL-CCNC: 25 U/L
ANION GAP SERPL CALC-SCNC: 13 MMOL/L
APPEARANCE: CLEAR
AST SERPL-CCNC: 11 U/L
BILIRUB SERPL-MCNC: 0.2 MG/DL
BILIRUBIN URINE: NEGATIVE
BLOOD URINE: NEGATIVE
BUN SERPL-MCNC: 14 MG/DL
CHLORIDE SERPL-SCNC: 101 MMOL/L
CHOLEST SERPL-MCNC: 113 MG/DL
CO2 SERPL-SCNC: 27 MMOL/L
COLOR: YELLOW
CREAT SERPL-MCNC: 1.17 MG/DL
CRP SERPL HS-MCNC: 0.62 MG/L
EGFR: 68 ML/MIN/1.73M2
ESTIMATED AVERAGE GLUCOSE: 177 MG/DL
ESTRADIOL SERPL-MCNC: 28 PG/ML
GLUCOSE QUALITATIVE U: >=1000 MG/DL
HBA1C MFR BLD HPLC: 7.8 %
HCT VFR BLD CALC: 43.4 %
HDLC SERPL-MCNC: 53 MG/DL
HGB BLD-MCNC: 14 G/DL
KETONES URINE: NEGATIVE MG/DL
LDLC SERPL CALC-MCNC: 41 MG/DL
LEUKOCYTE ESTERASE URINE: NEGATIVE
LH SERPL-ACNC: 10 IU/L
MCHC RBC-ENTMCNC: 29.7 PG
MCHC RBC-ENTMCNC: 32.3 GM/DL
MCV RBC AUTO: 91.9 FL
NITRITE URINE: NEGATIVE
PH URINE: 7
POTASSIUM SERPL-SCNC: 4.5 MMOL/L
PROLACTIN SERPL-MCNC: 11.4 NG/ML
PROT SERPL-MCNC: 7.5 G/DL
PROTEIN URINE: NEGATIVE MG/DL
RBC # BLD: 4.72 M/UL
RBC # FLD: 14.3 %
SODIUM SERPL-SCNC: 142 MMOL/L
SPECIFIC GRAVITY URINE: >1.03
TRIGL SERPL-MCNC: 103 MG/DL
TSH SERPL-ACNC: 0.96 UIU/ML
UROBILINOGEN URINE: 0.2 MG/DL
WBC # FLD AUTO: 9.71 K/UL

## 2024-07-17 LAB — TESTOST FREE SERPL-MCNC: 4.1 PG/ML

## 2024-08-02 NOTE — PHYSICAL EXAM
[General Appearance - Well Developed] : well developed [General Appearance - Well Nourished] : well nourished [Normal Appearance] : normal appearance [Well Groomed] : well groomed [General Appearance - In No Acute Distress] : no acute distress [Heart Rate And Rhythm] : heart rate and rhythm were normal [Edema] : no peripheral edema [Arterial Pulses Normal] : the pedal pulses were normal [Respiration, Rhythm And Depth] : normal respiratory rhythm and effort [Exaggerated Use Of Accessory Muscles For Inspiration] : no accessory muscle use [Auscultation Breath Sounds / Voice Sounds] : lungs were clear to auscultation bilaterally [Chest Palpation] : palpation of the chest revealed no abnormalities [Lungs Percussion] : the lungs were normal to percussion [Bowel Sounds] : normal bowel sounds [Abdomen Soft] : soft [Abdomen Tenderness] : non-tender [Abdomen Hernia] : no hernia was discovered [Abdomen Mass (___ Cm)] : no abdominal mass palpated [Costovertebral Angle Tenderness] : no ~M costovertebral angle tenderness [Urethral Meatus] : meatus normal [Penis Abnormality] : normal uncircumcised penis [Urinary Bladder Findings] : the bladder was normal on palpation [Scrotum] : the scrotum was normal [Rectal Exam - Seminal Vesicles] : the seminal vesicles were normal [Epididymis] : the epididymides were normal [Testes Mass (___cm)] : there were no testicular masses [Testes Tenderness] : no tenderness of the testes [Anus Abnormality] : the anus and perineum were normal [Rectal Exam - Rectum] : digital rectal exam was normal [Prostate Enlargement] : the prostate was not enlarged [No Prostate Nodules] : no prostate nodules [Prostate Tenderness] : the prostate was not tender [Normal Station and Gait] : the gait and station were normal for the patient's age [Skin Color & Pigmentation] : normal skin color and pigmentation [Skin Turgor] : supple [] : no rash [Skin Lesions] : no skin lesions [No Focal Deficits] : no focal deficits [Sensation] : the sensory exam was normal to light touch and pinprick [Motor Exam] : the motor exam was normal [Oriented To Time, Place, And Person] : oriented to person, place, and time [Affect] : the affect was normal [Mood] : the mood was normal [Not Anxious] : not anxious [No Palpable Adenopathy] : no palpable adenopathy [Cervical Lymph Nodes Enlarged Posterior Bilaterally] : posterior cervical [Cervical Lymph Nodes Enlarged Anterior Bilaterally] : anterior cervical [Supraclavicular Lymph Nodes Enlarged Bilaterally] : supraclavicular [Axillary Lymph Nodes Enlarged Bilaterally] : axillary [Femoral Lymph Nodes Enlarged Bilaterally] : femoral [Inguinal Lymph Nodes Enlarged Bilaterally] : inguinal

## 2024-08-02 NOTE — PHYSICAL EXAM
[General Appearance - Well Developed] : well developed [General Appearance - Well Nourished] : well nourished [Normal Appearance] : normal appearance [Well Groomed] : well groomed [General Appearance - In No Acute Distress] : no acute distress [Heart Rate And Rhythm] : heart rate and rhythm were normal [Edema] : no peripheral edema [Arterial Pulses Normal] : the pedal pulses were normal [Respiration, Rhythm And Depth] : normal respiratory rhythm and effort [Auscultation Breath Sounds / Voice Sounds] : lungs were clear to auscultation bilaterally [Exaggerated Use Of Accessory Muscles For Inspiration] : no accessory muscle use [Chest Palpation] : palpation of the chest revealed no abnormalities [Lungs Percussion] : the lungs were normal to percussion [Bowel Sounds] : normal bowel sounds [Abdomen Soft] : soft [Abdomen Tenderness] : non-tender [Abdomen Hernia] : no hernia was discovered [Abdomen Mass (___ Cm)] : no abdominal mass palpated [Costovertebral Angle Tenderness] : no ~M costovertebral angle tenderness [Urethral Meatus] : meatus normal [Urinary Bladder Findings] : the bladder was normal on palpation [Penis Abnormality] : normal uncircumcised penis [Scrotum] : the scrotum was normal [Rectal Exam - Seminal Vesicles] : the seminal vesicles were normal [Epididymis] : the epididymides were normal [Testes Tenderness] : no tenderness of the testes [Testes Mass (___cm)] : there were no testicular masses [Anus Abnormality] : the anus and perineum were normal [Rectal Exam - Rectum] : digital rectal exam was normal [Prostate Enlargement] : the prostate was not enlarged [No Prostate Nodules] : no prostate nodules [Prostate Tenderness] : the prostate was not tender [Normal Station and Gait] : the gait and station were normal for the patient's age [Skin Color & Pigmentation] : normal skin color and pigmentation [Skin Turgor] : supple [] : no rash [Skin Lesions] : no skin lesions [No Focal Deficits] : no focal deficits [Sensation] : the sensory exam was normal to light touch and pinprick [Motor Exam] : the motor exam was normal [Oriented To Time, Place, And Person] : oriented to person, place, and time [Affect] : the affect was normal [Mood] : the mood was normal [Not Anxious] : not anxious [No Palpable Adenopathy] : no palpable adenopathy [Cervical Lymph Nodes Enlarged Posterior Bilaterally] : posterior cervical [Cervical Lymph Nodes Enlarged Anterior Bilaterally] : anterior cervical [Supraclavicular Lymph Nodes Enlarged Bilaterally] : supraclavicular [Axillary Lymph Nodes Enlarged Bilaterally] : axillary [Femoral Lymph Nodes Enlarged Bilaterally] : femoral [Inguinal Lymph Nodes Enlarged Bilaterally] : inguinal

## 2024-08-06 ENCOUNTER — APPOINTMENT (OUTPATIENT)
Dept: UROLOGY | Facility: CLINIC | Age: 69
End: 2024-08-06

## 2024-08-06 ENCOUNTER — OUTPATIENT (OUTPATIENT)
Dept: OUTPATIENT SERVICES | Facility: HOSPITAL | Age: 69
LOS: 1 days | End: 2024-08-06
Payer: MEDICARE

## 2024-08-06 DIAGNOSIS — R35.0 FREQUENCY OF MICTURITION: ICD-10-CM

## 2024-08-06 PROCEDURE — 54235 NJX CORPORA CAVERNOSA RX AGT: CPT

## 2024-08-06 PROCEDURE — 99214 OFFICE O/P EST MOD 30 MIN: CPT | Mod: 25

## 2024-08-06 PROCEDURE — 93980 PENILE VASCULAR STUDY: CPT

## 2024-08-06 PROCEDURE — 93980 PENILE VASCULAR STUDY: CPT | Mod: 26

## 2024-08-06 NOTE — ASSESSMENT
[FreeTextEntry1] : The patient returns for follow-up to review his recent blood studies and discuss options for therapy. Laboratory studies dated July 11, 2024 demonstrated a urine glucose greater than 1000 mg/dL.  His hematocrit is 43.4%, hemoglobin A1c 7.8% and medical evaluation was suggested.  Estradiol 28 pg/mL, LH 10.0 IU/L, prolactin 11.4 ng/mL, TSH 0.96 IU/mL, CRP 0.62 mg/L.  His free testosterone was slightly low at 4.1 pg/mL with a total testosterone of 304 ng/dL.  Penile duplex ultrasound demonstrated a left penile curve of 55 to 60 degrees located 2.5 cm from the glans. He also had 85% tunescence and 75% rigidity.  We discussed several options for treatment of his diagnosis of penile curvature (Peyronie's Disease) including surgical and injectable therapies.    He has a palpable penile plaque with location, curvature and description noted in his penile duplex ultrasound note as measured with a goniometer that does not involve the urethra. Patient is over 18 years old.    Patient has erectile function and no pain when having an erection. His curvature has been stable.  We discussed at length the use of Xiaflex for treatment of his penile curvature.  We discussed realistic expectations.  He understands that he will have 2 injections into the plaque 1-3 days apart for each cycle. This will be followed by penile modeling until his next cycle.  He understands it would likely take several cycles of Xiaflex before he might see an improvement.  He understands the possibility for penile edema, penile ecchymosis and penile fracture. Patient has been made aware that he cannot have sex for 4 weeks after injections   He has questions which were answered to his satisfaction.  He would like to proceed with Xiaflex therapy.  Xiaflex was ordered.   Consultation: 35 minutes reviewing his history and discussing prior results, discussing various treatment options, writing medication prescriptions, requests for lab testing and writing his note. There was also additional time in preparing for the visit.

## 2024-08-06 NOTE — HISTORY OF PRESENT ILLNESS
[FreeTextEntry1] : The patient returns for follow-up to review his recent blood studies and discuss options for therapy.  PMH: Patient is a 69-year-old diabetic and hypertensive gentleman who presents with a chief complaint of left penile curvature and erectile dysfunction.  He is seeing Dr. Vicente for his elevated PSA.  He states that this has been present for the past 2 years. It causes both he and his partner great stress.  I reviewed the questionnaire he completed in detail. His erections are not modified with the degree of sexual stimulation.   He states that his erections presently are often less than 0 out of 10 in both tumescence and rigidity, insufficient for penetration.   He often ejaculates through a flaccid phallus.  He has difficulty maintaining an erection. He describes a normal libido.  His sexual dysfunction occurs with both sexual relations and masturbation.  His erections are not improved with PDE5 inhibitors.    His partner is understanding and was unable to be with him at the visit today. He is not  and has adult children.    The patient denies fevers, chills, nausea and/or vomiting and no unexplained weight loss.  His past medical history is non-contributory.  In his present occupation he has no known toxin exposure. He does not smoke and drinks socially.  He has no known drug allergies. His review of systems and past medical history demonstrates no significant urologic issues (see patient completed questionnaire). His family history demonstrates no significant urologic issues. A chaperone was offered to be present for the examination but declined by the patient.

## 2024-08-07 DIAGNOSIS — N52.9 MALE ERECTILE DYSFUNCTION, UNSPECIFIED: ICD-10-CM

## 2024-08-16 ENCOUNTER — APPOINTMENT (OUTPATIENT)
Dept: INTERNAL MEDICINE | Facility: CLINIC | Age: 69
End: 2024-08-16
Payer: MEDICARE

## 2024-08-16 ENCOUNTER — RX RENEWAL (OUTPATIENT)
Age: 69
End: 2024-08-16

## 2024-08-16 VITALS
DIASTOLIC BLOOD PRESSURE: 80 MMHG | SYSTOLIC BLOOD PRESSURE: 120 MMHG | OXYGEN SATURATION: 95 % | BODY MASS INDEX: 31.08 KG/M2 | HEART RATE: 73 BPM | HEIGHT: 67 IN | WEIGHT: 198 LBS

## 2024-08-16 DIAGNOSIS — E11.9 TYPE 2 DIABETES MELLITUS W/OUT COMPLICATIONS: ICD-10-CM

## 2024-08-16 DIAGNOSIS — E78.5 HYPERLIPIDEMIA, UNSPECIFIED: ICD-10-CM

## 2024-08-16 DIAGNOSIS — I10 ESSENTIAL (PRIMARY) HYPERTENSION: ICD-10-CM

## 2024-08-16 PROCEDURE — G2211 COMPLEX E/M VISIT ADD ON: CPT

## 2024-08-16 PROCEDURE — 99214 OFFICE O/P EST MOD 30 MIN: CPT

## 2024-08-18 NOTE — PHYSICAL EXAM
[No Edema] : there was no peripheral edema [Normal] : soft, non-tender, non-distended, no masses palpated, no HSM and normal bowel sounds [de-identified] : No suspicious skin lesions

## 2024-08-18 NOTE — HISTORY OF PRESENT ILLNESS
[de-identified] : 69-year-old pleasant AA man with history of diabetes mellitus, hypertension, hyperlipidemia and elevated PSA with bilateral hydrocele here for follow-up hypertension and labs for his diabetes and cholesterol. , lives with wife Labs from July 11, 2024 with done by his urologist and reviewed with patient today  follow-up -history of gross hematuria, frequency and flank pain History of hydroceles right larger than left, treated for orchitis with 1 week of Bactrim in February 2023. Ultrasound of the testicle shows moderate hydrocele which is reduced.  History of erectile dysfunction, using Viagra as needed with follow-up Dr. Mendoza appreciated  History of elevated PSA with MRI of the prostate January 2023 with no suspicious lesions, last PSA  DM-A1c down to 7.6%, last A1c 7.8% 7/12/2024, on metformin, glipizide, and Jardiance History of hypertension on an ACE inhibitor History of hyperlipidemia, last labs 7/12/2024 with LDL 41, on statin

## 2024-08-18 NOTE — PHYSICAL EXAM
[No Edema] : there was no peripheral edema [Normal] : soft, non-tender, non-distended, no masses palpated, no HSM and normal bowel sounds [de-identified] : No suspicious skin lesions

## 2024-08-18 NOTE — HISTORY OF PRESENT ILLNESS
[de-identified] : 69-year-old pleasant AA man with history of diabetes mellitus, hypertension, hyperlipidemia and elevated PSA with bilateral hydrocele here for follow-up hypertension and labs for his diabetes and cholesterol. , lives with wife Labs from July 11, 2024 with done by his urologist and reviewed with patient today  follow-up -history of gross hematuria, frequency and flank pain History of hydroceles right larger than left, treated for orchitis with 1 week of Bactrim in February 2023. Ultrasound of the testicle shows moderate hydrocele which is reduced.  History of erectile dysfunction, using Viagra as needed with follow-up Dr. Mendoza appreciated  History of elevated PSA with MRI of the prostate January 2023 with no suspicious lesions, last PSA  DM-A1c down to 7.6%, last A1c 7.8% 7/12/2024, on metformin, glipizide, and Jardiance History of hypertension on an ACE inhibitor History of hyperlipidemia, last labs 7/12/2024 with LDL 41, on statin

## 2024-08-26 ENCOUNTER — APPOINTMENT (OUTPATIENT)
Dept: UROLOGY | Facility: CLINIC | Age: 69
End: 2024-08-26
Payer: MEDICARE

## 2024-08-26 PROCEDURE — 99214 OFFICE O/P EST MOD 30 MIN: CPT

## 2024-08-26 PROCEDURE — G2211 COMPLEX E/M VISIT ADD ON: CPT

## 2024-08-26 NOTE — ASSESSMENT
[FreeTextEntry1] : The patient returns for follow-up.  He has been having difficulty getting his insurance to cover the Xiaflex.  My staff has been working on it and will get back in touch with him as soon as they find out more information.  Laboratory studies dated July 11, 2024 demonstrated a urine glucose greater than 1000 mg/dL.  His hematocrit is 43.4%, hemoglobin A1c 7.8% and medical evaluation was suggested.  Estradiol 28 pg/mL, LH 10.0 IU/L, prolactin 11.4 ng/mL, TSH 0.96 IU/mL, CRP 0.62 mg/L.  His free testosterone was slightly low at 4.1 pg/mL with a total testosterone of 304 ng/dL.  Penile duplex ultrasound demonstrated a left penile curve of 55 to 60 degrees located 2.5 cm from the glans. He also had 85% tunescence and 75% rigidity.  We discussed several options for treatment of his diagnosis of penile curvature (Peyronie's Disease) including surgical and injectable therapies.  If his insurance does not cover Xiaflex we will need to move to an alternative therapy.  He has a palpable penile plaque with location, curvature and description noted in his penile duplex ultrasound note as measured with a goniometer that does not involve the urethra. Patient is over 18 years old.    Patient has erectile function and no pain when having an erection. His curvature has been stable.  We discussed at length the use of Xiaflex for treatment of his penile curvature.  We discussed realistic expectations.  He understands that he will have 2 injections into the plaque 1-3 days apart for each cycle. This will be followed by penile modeling until his next cycle.  He understands it would likely take several cycles of Xiaflex before he might see an improvement.  He understands the possibility for penile edema, penile ecchymosis and penile fracture. Patient has been made aware that he cannot have sex for 4 weeks after injections   He has questions which were answered to his satisfaction.  He would like to proceed with Xiaflex therapy.  Telehealth Consultation: 35 minutes reviewing his history and discussing prior results, discussing various treatment options, reviewing his recent lab testing and writing his note. There was also additional time in preparing for the visit and assisting the patient with technology issues he was having with the telehealth platform.

## 2024-08-27 ENCOUNTER — APPOINTMENT (OUTPATIENT)
Dept: UROLOGY | Facility: CLINIC | Age: 69
End: 2024-08-27
Payer: MEDICARE

## 2024-08-27 PROCEDURE — G2211 COMPLEX E/M VISIT ADD ON: CPT

## 2024-08-27 PROCEDURE — 99214 OFFICE O/P EST MOD 30 MIN: CPT

## 2024-09-16 NOTE — PHYSICAL EXAM
[de-identified] : Constitutional\par o Appearance : well-nourished, well developed, alert, in no acute distress \par Head and Face\par o Head :\par ¦ Inspection : atraumatic, normocephalic\par o Face :\par ¦ Inspection : no visible rash or discoloration\par Respiratory\par o Respiratory Effort: breathing unlabored \par Neurologic\par o Sensation : Normal sensation \par Psychiatric\par o Mood and Affect: mood normal, affect appropriate \par Lymphatic\par o Additional Nodes : No palpable lymph nodes present \par \par Cervical Spine\par o Inspection/Palpation :\par ¦ Inspection : alignment midline, normal degree of lordosis present\par ¦ Skin : normal appearance, no masses or tenderness, trachea midline\par ¦ Palpation : musculature is nontender to palpation\par o Range of Motion : limited rotation to the left \par o Tests: Negative Spurling’s test \par \par Right Upper Extremity\par o Right Shoulder :\par ¦ Inspection/Palpation : no tenderness, no swelling or deformities\par ¦ Range of Motion : full and painless in all planes, no crepitance\par ¦ Strength : forward elevation 5/5, IR 5/5, ER 5/5, ER at 90° of abduction 5/5, supraspinatus 5/5, adduction 5/5, abduction 5/5, biceps/triceps 5/5,  5/5 \par ¦ Stability : no joint instability on provocative testing \par ¦ Tests: Murphy negative, Neer test negative, Josh test negative, drop arm test negative, Continental's test negative \par \par Left Upper Extremity\par o Left Shoulder :\par ¦ Inspection/Palpation : mild anterior capsular tenderness, tenderness over AC joint, no swelling or deformities\par ¦ Range of Motion : full forward flexion with discomfort, pain with eccentric flexion, slightly limited IR, pain with full ER\par ¦ Strength : forward elevation 4-/5, IR 5/5, ER 5/5, ER at 90° of abduction 5/5, supraspinatus 4-/5, adduction 5/5, abduction 5/5, biceps/triceps 5/5,  5/5 \par ¦ Stability : no joint instability on provocative testing, \par ¦ Tests: Murphy negative, Neer test negative, Josh test negative, drop arm test negative, Continental's test negative, cross chest impingement test improved after injection \par \par Gait and Station:\par Gait: gait normal, no significant extremity swelling or lymphedema, good proprioception and balance\par \par o Shoulder : Indication- shoulder Impingement, Anatomic location- left AC joint , Spray - area was sterilized with Betadine and alcohol and anesthetized with Ethyl Chloride , needle used-25G, Medications given- 1cc's lidocaine, 0.5cc's kenalog, 0.5 cc's dexamethasone. The patient tolerated the procedure well. He demonstrated significant improvement in ROM and strength immediately after the injection. 
No respiratory distress. No stridor, Lungs sounds clear with + few crackles mildly heard on left side

## 2024-10-10 ENCOUNTER — OUTPATIENT (OUTPATIENT)
Dept: OUTPATIENT SERVICES | Facility: HOSPITAL | Age: 69
LOS: 1 days | End: 2024-10-10
Payer: COMMERCIAL

## 2024-10-10 ENCOUNTER — APPOINTMENT (OUTPATIENT)
Dept: UROLOGY | Facility: CLINIC | Age: 69
End: 2024-10-10
Payer: MEDICARE

## 2024-10-10 VITALS
TEMPERATURE: 97.8 F | RESPIRATION RATE: 17 BRPM | DIASTOLIC BLOOD PRESSURE: 86 MMHG | HEART RATE: 84 BPM | SYSTOLIC BLOOD PRESSURE: 127 MMHG | OXYGEN SATURATION: 96 %

## 2024-10-10 VITALS
HEART RATE: 73 BPM | SYSTOLIC BLOOD PRESSURE: 137 MMHG | OXYGEN SATURATION: 98 % | RESPIRATION RATE: 17 BRPM | DIASTOLIC BLOOD PRESSURE: 90 MMHG | TEMPERATURE: 98.5 F

## 2024-10-10 DIAGNOSIS — R35.0 FREQUENCY OF MICTURITION: ICD-10-CM

## 2024-10-10 PROCEDURE — 54200 INJECTION PX PEYRONIE DS: CPT

## 2024-10-10 RX ORDER — COLLAGENASE CLOSTRIDIUM HISTOLYTICUM 0.9 MG
0.9 KIT INJECTION
Refills: 0 | Status: COMPLETED | OUTPATIENT
Start: 2024-10-10

## 2024-10-10 RX ADMIN — COLLAGENASE CLOSTRIDIUM HISTOLYTICUM 0 MG: KIT at 00:00

## 2024-10-11 DIAGNOSIS — N48.6 INDURATION PENIS PLASTICA: ICD-10-CM

## 2024-10-15 ENCOUNTER — APPOINTMENT (OUTPATIENT)
Dept: UROLOGY | Facility: CLINIC | Age: 69
End: 2024-10-15
Payer: MEDICARE

## 2024-10-15 ENCOUNTER — OUTPATIENT (OUTPATIENT)
Dept: OUTPATIENT SERVICES | Facility: HOSPITAL | Age: 69
LOS: 1 days | End: 2024-10-15
Payer: COMMERCIAL

## 2024-10-15 VITALS — OXYGEN SATURATION: 95 % | SYSTOLIC BLOOD PRESSURE: 131 MMHG | DIASTOLIC BLOOD PRESSURE: 87 MMHG | HEART RATE: 77 BPM

## 2024-10-15 DIAGNOSIS — R35.0 FREQUENCY OF MICTURITION: ICD-10-CM

## 2024-10-15 DIAGNOSIS — N48.6 INDURATION PENIS PLASTICA: ICD-10-CM

## 2024-10-15 PROCEDURE — 54200 INJECTION PX PEYRONIE DS: CPT | Mod: 58

## 2024-10-15 PROCEDURE — 54200 INJECTION PX PEYRONIE DS: CPT

## 2024-10-15 RX ORDER — COLLAGENASE CLOSTRIDIUM HISTOLYTICUM 0.9 MG
0.9 KIT INJECTION
Refills: 0 | Status: COMPLETED | OUTPATIENT
Start: 2024-10-15

## 2024-10-15 RX ADMIN — COLLAGENASE CLOSTRIDIUM HISTOLYTICUM 0 MG: KIT at 00:00

## 2024-10-16 DIAGNOSIS — N48.6 INDURATION PENIS PLASTICA: ICD-10-CM

## 2024-10-21 ENCOUNTER — APPOINTMENT (OUTPATIENT)
Dept: OTOLARYNGOLOGY | Facility: CLINIC | Age: 69
End: 2024-10-21

## 2024-10-23 ENCOUNTER — RX RENEWAL (OUTPATIENT)
Age: 69
End: 2024-10-23

## 2024-11-12 ENCOUNTER — APPOINTMENT (OUTPATIENT)
Dept: UROLOGY | Facility: CLINIC | Age: 69
End: 2024-11-12
Payer: MEDICARE

## 2024-11-12 PROCEDURE — G2211 COMPLEX E/M VISIT ADD ON: CPT

## 2024-11-12 PROCEDURE — 99214 OFFICE O/P EST MOD 30 MIN: CPT

## 2024-11-14 ENCOUNTER — APPOINTMENT (OUTPATIENT)
Dept: UROLOGY | Facility: CLINIC | Age: 69
End: 2024-11-14

## 2024-12-03 ENCOUNTER — APPOINTMENT (OUTPATIENT)
Dept: UROLOGY | Facility: CLINIC | Age: 69
End: 2024-12-03

## 2024-12-03 ENCOUNTER — OUTPATIENT (OUTPATIENT)
Dept: OUTPATIENT SERVICES | Facility: HOSPITAL | Age: 69
LOS: 1 days | End: 2024-12-03
Payer: COMMERCIAL

## 2024-12-03 VITALS
DIASTOLIC BLOOD PRESSURE: 90 MMHG | SYSTOLIC BLOOD PRESSURE: 128 MMHG | OXYGEN SATURATION: 98 % | TEMPERATURE: 98.2 F | RESPIRATION RATE: 16 BRPM | HEART RATE: 82 BPM

## 2024-12-03 DIAGNOSIS — R35.0 FREQUENCY OF MICTURITION: ICD-10-CM

## 2024-12-03 PROCEDURE — ZZZZZ: CPT

## 2024-12-03 PROCEDURE — 54235 NJX CORPORA CAVERNOSA RX AGT: CPT

## 2024-12-04 DIAGNOSIS — N48.6 INDURATION PENIS PLASTICA: ICD-10-CM

## 2024-12-05 ENCOUNTER — OUTPATIENT (OUTPATIENT)
Dept: OUTPATIENT SERVICES | Facility: HOSPITAL | Age: 69
LOS: 1 days | End: 2024-12-05
Payer: COMMERCIAL

## 2024-12-05 ENCOUNTER — APPOINTMENT (OUTPATIENT)
Dept: UROLOGY | Facility: CLINIC | Age: 69
End: 2024-12-05
Payer: MEDICARE

## 2024-12-05 VITALS
DIASTOLIC BLOOD PRESSURE: 86 MMHG | HEART RATE: 93 BPM | TEMPERATURE: 97.6 F | RESPIRATION RATE: 16 BRPM | SYSTOLIC BLOOD PRESSURE: 129 MMHG

## 2024-12-05 DIAGNOSIS — N48.6 INDURATION PENIS PLASTICA: ICD-10-CM

## 2024-12-05 DIAGNOSIS — R35.0 FREQUENCY OF MICTURITION: ICD-10-CM

## 2024-12-05 PROCEDURE — 54200 INJECTION PX PEYRONIE DS: CPT

## 2024-12-05 RX ORDER — COLLAGENASE CLOSTRIDIUM HISTOLYTICUM 0.9 MG
0.9 KIT INJECTION
Refills: 0 | Status: COMPLETED | OUTPATIENT
Start: 2024-12-05

## 2024-12-05 RX ADMIN — COLLAGENASE CLOSTRIDIUM HISTOLYTICUM 0 MG: KIT at 00:00

## 2024-12-06 DIAGNOSIS — N48.6 INDURATION PENIS PLASTICA: ICD-10-CM

## 2024-12-10 ENCOUNTER — APPOINTMENT (OUTPATIENT)
Dept: UROLOGY | Facility: CLINIC | Age: 69
End: 2024-12-10
Payer: MEDICARE

## 2024-12-10 ENCOUNTER — OUTPATIENT (OUTPATIENT)
Dept: OUTPATIENT SERVICES | Facility: HOSPITAL | Age: 69
LOS: 1 days | End: 2024-12-10
Payer: COMMERCIAL

## 2024-12-10 VITALS
OXYGEN SATURATION: 99 % | RESPIRATION RATE: 16 BRPM | HEART RATE: 86 BPM | DIASTOLIC BLOOD PRESSURE: 85 MMHG | TEMPERATURE: 97.7 F | SYSTOLIC BLOOD PRESSURE: 126 MMHG

## 2024-12-10 VITALS — RESPIRATION RATE: 16 BRPM | SYSTOLIC BLOOD PRESSURE: 146 MMHG | DIASTOLIC BLOOD PRESSURE: 85 MMHG | HEART RATE: 97 BPM

## 2024-12-10 DIAGNOSIS — R35.0 FREQUENCY OF MICTURITION: ICD-10-CM

## 2024-12-10 PROCEDURE — 54200 INJECTION PX PEYRONIE DS: CPT | Mod: 58

## 2024-12-10 PROCEDURE — 54200 INJECTION PX PEYRONIE DS: CPT

## 2024-12-10 RX ORDER — COLLAGENASE CLOSTRIDIUM HISTOLYTICUM 0.9 MG
0.9 KIT INJECTION
Refills: 0 | Status: COMPLETED | OUTPATIENT
Start: 2024-12-10

## 2024-12-10 RX ADMIN — COLLAGENASE CLOSTRIDIUM HISTOLYTICUM 0 MG: KIT at 00:00

## 2024-12-10 NOTE — PHYSICAL EXAM
Patient provided discharge instructions, verbalizes understanding and denies any further questions. Patient instructed to follow up with PCP and return if condition worsens. No distress noted. Patient ambulated to the front lobby with a steady gait and all belongings. Bus pass provided.    [de-identified] : WDWN in NAD\par HEENT:  unremarkable\par Neck:  supple, no JVD, no LN\par Lungs:  CTA B/L, no W/R/R\par Heart:  Reg rate, +S1S2, no M/R/G\par Abdomen:  soft, NT, ND, +BS, no masses, no HS-megaly\par Genital: No pubic or genital lesions noted.\par Ext:  no C/C/E\par Neuro:  no focal deficits

## 2024-12-11 DIAGNOSIS — N48.6 INDURATION PENIS PLASTICA: ICD-10-CM

## 2024-12-26 ENCOUNTER — APPOINTMENT (OUTPATIENT)
Dept: UROLOGY | Facility: CLINIC | Age: 69
End: 2024-12-26
Payer: MEDICARE

## 2024-12-26 PROCEDURE — 99214 OFFICE O/P EST MOD 30 MIN: CPT

## 2024-12-26 PROCEDURE — G2211 COMPLEX E/M VISIT ADD ON: CPT

## 2025-02-11 ENCOUNTER — APPOINTMENT (OUTPATIENT)
Dept: UROLOGY | Facility: CLINIC | Age: 70
End: 2025-02-11
Payer: MEDICARE

## 2025-02-11 ENCOUNTER — OUTPATIENT (OUTPATIENT)
Dept: OUTPATIENT SERVICES | Facility: HOSPITAL | Age: 70
LOS: 1 days | End: 2025-02-11
Payer: COMMERCIAL

## 2025-02-11 VITALS — SYSTOLIC BLOOD PRESSURE: 150 MMHG | DIASTOLIC BLOOD PRESSURE: 101 MMHG

## 2025-02-11 DIAGNOSIS — R35.0 FREQUENCY OF MICTURITION: ICD-10-CM

## 2025-02-11 DIAGNOSIS — N48.6 INDURATION PENIS PLASTICA: ICD-10-CM

## 2025-02-11 PROCEDURE — 54235 NJX CORPORA CAVERNOSA RX AGT: CPT

## 2025-02-11 PROCEDURE — 99214 OFFICE O/P EST MOD 30 MIN: CPT | Mod: 25

## 2025-02-12 DIAGNOSIS — N48.6 INDURATION PENIS PLASTICA: ICD-10-CM

## 2025-02-12 RX ORDER — COLLAGENASE CLOSTRIDIUM HISTOLYTICUM 0.9 MG
0.9 KIT INJECTION
Qty: 2 | Refills: 0 | Status: ACTIVE | COMMUNITY
Start: 2025-02-11 | End: 1900-01-01

## 2025-02-13 ENCOUNTER — APPOINTMENT (OUTPATIENT)
Dept: UROLOGY | Facility: CLINIC | Age: 70
End: 2025-02-13

## 2025-02-15 ENCOUNTER — RX RENEWAL (OUTPATIENT)
Age: 70
End: 2025-02-15

## 2025-02-25 ENCOUNTER — APPOINTMENT (OUTPATIENT)
Dept: INTERNAL MEDICINE | Facility: CLINIC | Age: 70
End: 2025-02-25
Payer: MEDICARE

## 2025-02-25 ENCOUNTER — OUTPATIENT (OUTPATIENT)
Dept: OUTPATIENT SERVICES | Facility: HOSPITAL | Age: 70
LOS: 1 days | End: 2025-02-25
Payer: MEDICARE

## 2025-02-25 VITALS
HEIGHT: 67 IN | WEIGHT: 200 LBS | SYSTOLIC BLOOD PRESSURE: 130 MMHG | DIASTOLIC BLOOD PRESSURE: 90 MMHG | BODY MASS INDEX: 31.39 KG/M2 | HEART RATE: 73 BPM | OXYGEN SATURATION: 97 %

## 2025-02-25 DIAGNOSIS — R97.20 ELEVATED PROSTATE, SPECIFIC ANTIGEN [PSA]: ICD-10-CM

## 2025-02-25 DIAGNOSIS — E11.9 TYPE 2 DIABETES MELLITUS W/OUT COMPLICATIONS: ICD-10-CM

## 2025-02-25 DIAGNOSIS — I10 ESSENTIAL (PRIMARY) HYPERTENSION: ICD-10-CM

## 2025-02-25 PROCEDURE — G0444 DEPRESSION SCREEN ANNUAL: CPT | Mod: 59

## 2025-02-25 PROCEDURE — G0008: CPT

## 2025-02-25 PROCEDURE — G0439: CPT

## 2025-02-25 PROCEDURE — 90662 IIV NO PRSV INCREASED AG IM: CPT

## 2025-02-27 ENCOUNTER — APPOINTMENT (OUTPATIENT)
Dept: UROLOGY | Facility: CLINIC | Age: 70
End: 2025-02-27

## 2025-02-27 DIAGNOSIS — Z86.2 PERSONAL HISTORY OF DISEASES OF THE BLOOD AND BLOOD-FORMING ORGANS AND CERTAIN DISORDERS INVOLVING THE IMMUNE MECHANISM: ICD-10-CM

## 2025-02-28 LAB
ALBUMIN SERPL ELPH-MCNC: 4.5 G/DL
ALP BLD-CCNC: 52 U/L
ALT SERPL-CCNC: 17 U/L
ANION GAP SERPL CALC-SCNC: 15 MMOL/L
APPEARANCE: CLEAR
AST SERPL-CCNC: 17 U/L
BACTERIA: NEGATIVE /HPF
BASOPHILS # BLD AUTO: 0.06 K/UL
BASOPHILS NFR BLD AUTO: 0.6 %
BILIRUB SERPL-MCNC: 0.4 MG/DL
BILIRUBIN URINE: NEGATIVE
BLOOD URINE: NEGATIVE
BUN SERPL-MCNC: 11 MG/DL
CALCIUM SERPL-MCNC: 9.9 MG/DL
CAST: 0 /LPF
CHLORIDE SERPL-SCNC: 99 MMOL/L
CHOLEST SERPL-MCNC: 125 MG/DL
CO2 SERPL-SCNC: 24 MMOL/L
COLOR: YELLOW
CREAT SERPL-MCNC: 1.1 MG/DL
CREAT SPEC-SCNC: 111 MG/DL
EGFR: 73 ML/MIN/1.73M2
EOSINOPHIL # BLD AUTO: 0.15 K/UL
EOSINOPHIL NFR BLD AUTO: 1.4 %
EPITHELIAL CELLS: 0 /HPF
ESTIMATED AVERAGE GLUCOSE: 212 MG/DL
GLUCOSE QUALITATIVE U: NEGATIVE MG/DL
GLUCOSE SERPL-MCNC: 126 MG/DL
HBA1C MFR BLD HPLC: 9 %
HCT VFR BLD CALC: 43.3 %
HDLC SERPL-MCNC: 49 MG/DL
HGB BLD-MCNC: 14.2 G/DL
IMM GRANULOCYTES NFR BLD AUTO: 0.3 %
KETONES URINE: NEGATIVE MG/DL
LDLC SERPL CALC-MCNC: 60 MG/DL
LEUKOCYTE ESTERASE URINE: NEGATIVE
LYMPHOCYTES # BLD AUTO: 4.97 K/UL
LYMPHOCYTES NFR BLD AUTO: 46.5 %
MAN DIFF?: NORMAL
MCHC RBC-ENTMCNC: 29.6 PG
MCHC RBC-ENTMCNC: 32.8 G/DL
MCV RBC AUTO: 90.2 FL
MICROALBUMIN 24H UR DL<=1MG/L-MCNC: <1.2 MG/DL
MICROALBUMIN/CREAT 24H UR-RTO: NORMAL MG/G
MICROSCOPIC-UA: NORMAL
MONOCYTES # BLD AUTO: 0.67 K/UL
MONOCYTES NFR BLD AUTO: 6.3 %
NEUTROPHILS # BLD AUTO: 4.81 K/UL
NEUTROPHILS NFR BLD AUTO: 44.9 %
NITRITE URINE: NEGATIVE
NONHDLC SERPL-MCNC: 76 MG/DL
PH URINE: 7
PLATELET # BLD AUTO: 251 K/UL
POTASSIUM SERPL-SCNC: 4.2 MMOL/L
PROT SERPL-MCNC: 7.4 G/DL
PROTEIN URINE: NEGATIVE MG/DL
PSA FREE FLD-MCNC: 24 %
PSA FREE SERPL-MCNC: 0.85 NG/ML
PSA SERPL-MCNC: 3.47 NG/ML
RBC # BLD: 4.8 M/UL
RBC # FLD: 14.5 %
RED BLOOD CELLS URINE: 1 /HPF
SODIUM SERPL-SCNC: 138 MMOL/L
SPECIFIC GRAVITY URINE: 1.01
TRIGL SERPL-MCNC: 81 MG/DL
TSH SERPL-ACNC: 1.33 UIU/ML
UROBILINOGEN URINE: 0.2 MG/DL
WBC # FLD AUTO: 10.69 K/UL
WHITE BLOOD CELLS URINE: 0 /HPF

## 2025-03-10 DIAGNOSIS — R97.20 ELEVATED PROSTATE SPECIFIC ANTIGEN [PSA]: ICD-10-CM

## 2025-03-10 DIAGNOSIS — Z23 ENCOUNTER FOR IMMUNIZATION: ICD-10-CM

## 2025-03-10 DIAGNOSIS — E11.9 TYPE 2 DIABETES MELLITUS WITHOUT COMPLICATIONS: ICD-10-CM

## 2025-03-10 DIAGNOSIS — Z00.00 ENCOUNTER FOR GENERAL ADULT MEDICAL EXAMINATION WITHOUT ABNORMAL FINDINGS: ICD-10-CM

## 2025-03-10 DIAGNOSIS — Z13.31 ENCOUNTER FOR SCREENING FOR DEPRESSION: ICD-10-CM

## 2025-03-28 ENCOUNTER — OUTPATIENT (OUTPATIENT)
Dept: OUTPATIENT SERVICES | Facility: HOSPITAL | Age: 70
LOS: 1 days | Discharge: HOME | End: 2025-03-28

## 2025-03-28 DIAGNOSIS — D72.829 ELEVATED WHITE BLOOD CELL COUNT, UNSPECIFIED: ICD-10-CM

## 2025-03-31 ENCOUNTER — RESULT REVIEW (OUTPATIENT)
Age: 70
End: 2025-03-31

## 2025-03-31 ENCOUNTER — APPOINTMENT (OUTPATIENT)
Dept: HEMATOLOGY ONCOLOGY | Facility: CLINIC | Age: 70
End: 2025-03-31
Payer: MEDICARE

## 2025-03-31 VITALS
OXYGEN SATURATION: 95 % | HEART RATE: 76 BPM | HEIGHT: 65.98 IN | WEIGHT: 199.08 LBS | BODY MASS INDEX: 31.99 KG/M2 | TEMPERATURE: 98.2 F | RESPIRATION RATE: 16 BRPM | DIASTOLIC BLOOD PRESSURE: 83 MMHG | SYSTOLIC BLOOD PRESSURE: 135 MMHG

## 2025-03-31 DIAGNOSIS — D72.820 LYMPHOCYTOSIS (SYMPTOMATIC): ICD-10-CM

## 2025-03-31 DIAGNOSIS — D72.829 ELEVATED WHITE BLOOD CELL COUNT, UNSPECIFIED: ICD-10-CM

## 2025-03-31 DIAGNOSIS — Z86.2 PERSONAL HISTORY OF DISEASES OF THE BLOOD AND BLOOD-FORMING ORGANS AND CERTAIN DISORDERS INVOLVING THE IMMUNE MECHANISM: ICD-10-CM

## 2025-03-31 LAB
BASOPHILS # BLD AUTO: 0.05 K/UL — SIGNIFICANT CHANGE UP (ref 0–0.2)
BASOPHILS NFR BLD AUTO: 0.6 % — SIGNIFICANT CHANGE UP (ref 0–2)
EOSINOPHIL # BLD AUTO: 0.14 K/UL — SIGNIFICANT CHANGE UP (ref 0–0.5)
EOSINOPHIL NFR BLD AUTO: 1.6 % — SIGNIFICANT CHANGE UP (ref 0–6)
ERYTHROCYTE [SEDIMENTATION RATE] IN BLOOD: 5 MM/HR — SIGNIFICANT CHANGE UP (ref 0–20)
HCT VFR BLD CALC: 41.8 % — SIGNIFICANT CHANGE UP (ref 39–50)
HGB BLD-MCNC: 13.7 G/DL — SIGNIFICANT CHANGE UP (ref 13–17)
IMM GRANULOCYTES NFR BLD AUTO: 0.1 % — SIGNIFICANT CHANGE UP (ref 0–0.9)
LYMPHOCYTES # BLD AUTO: 3.68 K/UL — HIGH (ref 1–3.3)
LYMPHOCYTES # BLD AUTO: 43 % — SIGNIFICANT CHANGE UP (ref 13–44)
MCHC RBC-ENTMCNC: 29.3 PG — SIGNIFICANT CHANGE UP (ref 27–34)
MCHC RBC-ENTMCNC: 32.8 G/DL — SIGNIFICANT CHANGE UP (ref 32–36)
MCV RBC AUTO: 89.5 FL — SIGNIFICANT CHANGE UP (ref 80–100)
MONOCYTES # BLD AUTO: 0.59 K/UL — SIGNIFICANT CHANGE UP (ref 0–0.9)
MONOCYTES NFR BLD AUTO: 6.9 % — SIGNIFICANT CHANGE UP (ref 2–14)
NEUTROPHILS # BLD AUTO: 4.08 K/UL — SIGNIFICANT CHANGE UP (ref 1.8–7.4)
NEUTROPHILS NFR BLD AUTO: 47.8 % — SIGNIFICANT CHANGE UP (ref 43–77)
NRBC BLD AUTO-RTO: 0 /100 WBCS — SIGNIFICANT CHANGE UP (ref 0–0)
PLATELET # BLD AUTO: 213 K/UL — SIGNIFICANT CHANGE UP (ref 150–400)
RBC # BLD: 4.67 M/UL — SIGNIFICANT CHANGE UP (ref 4.2–5.8)
RBC # FLD: 13.8 % — SIGNIFICANT CHANGE UP (ref 10.3–14.5)
WBC # BLD: 8.55 K/UL — SIGNIFICANT CHANGE UP (ref 3.8–10.5)
WBC # FLD AUTO: 8.55 K/UL — SIGNIFICANT CHANGE UP (ref 3.8–10.5)

## 2025-03-31 PROCEDURE — G2211 COMPLEX E/M VISIT ADD ON: CPT

## 2025-03-31 PROCEDURE — 99205 OFFICE O/P NEW HI 60 MIN: CPT

## 2025-04-01 LAB
ALBUMIN SERPL ELPH-MCNC: 4.4 G/DL
ALP BLD-CCNC: 54 U/L
ALT SERPL-CCNC: 20 U/L
ANION GAP SERPL CALC-SCNC: 11 MMOL/L
AST SERPL-CCNC: 19 U/L
BILIRUB SERPL-MCNC: 0.2 MG/DL
BUN SERPL-MCNC: 14 MG/DL
CALCIUM SERPL-MCNC: 9.7 MG/DL
CD16+CD56+ CELLS # BLD: 395 CELLS/UL
CD16+CD56+ CELLS NFR BLD: 11 %
CD19 CELLS NFR BLD: 520 CELLS/UL
CD3 CELLS # BLD: 2571 CELLS/UL
CD3 CELLS NFR BLD: 73 %
CD3+CD4+ CELLS # BLD: 1887 CELLS/UL
CD3+CD4+ CELLS NFR BLD: 53 %
CD3+CD4+ CELLS/CD3+CD8+ CLL SPEC: 2.71 RATIO
CD3+CD8+ CELLS # SPEC: 696 CELLS/UL
CD3+CD8+ CELLS NFR BLD: 19 %
CELLS.CD3-CD19+/CELLS IN BLOOD: 15 %
CHLORIDE SERPL-SCNC: 103 MMOL/L
CO2 SERPL-SCNC: 25 MMOL/L
CREAT SERPL-MCNC: 1.12 MG/DL
CRP SERPL-MCNC: <3 MG/L
EGFRCR SERPLBLD CKD-EPI 2021: 71 ML/MIN/1.73M2
GLUCOSE SERPL-MCNC: 101 MG/DL
LDH SERPL-CCNC: 188 U/L
POTASSIUM SERPL-SCNC: 4.2 MMOL/L
PROT SERPL-MCNC: 7 G/DL
RHEUMATOID FACT SER QL: <10 IU/ML
SODIUM SERPL-SCNC: 139 MMOL/L
VIABILITY: NORMAL

## 2025-04-04 LAB — T(9;22)(ABL1,BCR)/CONTROL BLD/T: NORMAL

## 2025-04-08 ENCOUNTER — APPOINTMENT (OUTPATIENT)
Dept: UROLOGY | Facility: CLINIC | Age: 70
End: 2025-04-08

## 2025-04-08 DIAGNOSIS — N48.6 INDURATION PENIS PLASTICA: ICD-10-CM

## 2025-04-10 ENCOUNTER — APPOINTMENT (OUTPATIENT)
Dept: UROLOGY | Facility: CLINIC | Age: 70
End: 2025-04-10

## 2025-04-21 ENCOUNTER — NON-APPOINTMENT (OUTPATIENT)
Age: 70
End: 2025-04-21

## 2025-06-10 ENCOUNTER — APPOINTMENT (OUTPATIENT)
Dept: UROLOGY | Facility: CLINIC | Age: 70
End: 2025-06-10

## 2025-06-12 ENCOUNTER — APPOINTMENT (OUTPATIENT)
Dept: UROLOGY | Facility: CLINIC | Age: 70
End: 2025-06-12
Payer: MEDICARE

## 2025-06-12 PROCEDURE — G2211 COMPLEX E/M VISIT ADD ON: CPT

## 2025-06-12 PROCEDURE — 99214 OFFICE O/P EST MOD 30 MIN: CPT

## 2025-06-12 RX ORDER — PAPAVERINE HYDROCHLORIDE 30 MG/ML
30 INJECTION, SOLUTION INTRAVENOUS
Qty: 2 | Refills: 0 | Status: ACTIVE | COMMUNITY
Start: 2025-06-10 | End: 1900-01-01

## 2025-06-13 LAB
APPEARANCE: ABNORMAL
BACTERIA: NEGATIVE /HPF
BILIRUBIN URINE: NEGATIVE
BLOOD URINE: NEGATIVE
CALCIUM OXALATE CRYSTALS: PRESENT
CAST: 1 /LPF
COLOR: YELLOW
EPITHELIAL CELLS: 2 /HPF
GLUCOSE QUALITATIVE U: NEGATIVE MG/DL
KETONES URINE: ABNORMAL MG/DL
LEUKOCYTE ESTERASE URINE: NEGATIVE
MICROSCOPIC-UA: NORMAL
NITRITE URINE: NEGATIVE
PH URINE: 5.5
PROTEIN URINE: NORMAL MG/DL
RED BLOOD CELLS URINE: 2 /HPF
REVIEW: NORMAL
SPECIFIC GRAVITY URINE: 1.03
UROBILINOGEN URINE: 0.2 MG/DL
WHITE BLOOD CELLS URINE: 1 /HPF

## 2025-06-14 LAB — URINE CYTOLOGY: NORMAL

## 2025-07-08 ENCOUNTER — APPOINTMENT (OUTPATIENT)
Dept: UROLOGY | Facility: CLINIC | Age: 70
End: 2025-07-08

## 2025-08-12 ENCOUNTER — OUTPATIENT (OUTPATIENT)
Dept: OUTPATIENT SERVICES | Facility: HOSPITAL | Age: 70
LOS: 1 days | End: 2025-08-12
Payer: MEDICARE

## 2025-08-12 ENCOUNTER — APPOINTMENT (OUTPATIENT)
Dept: UROLOGY | Facility: CLINIC | Age: 70
End: 2025-08-12
Payer: MEDICARE

## 2025-08-12 VITALS
OXYGEN SATURATION: 99 % | SYSTOLIC BLOOD PRESSURE: 140 MMHG | DIASTOLIC BLOOD PRESSURE: 92 MMHG | HEART RATE: 81 BPM | TEMPERATURE: 98.2 F

## 2025-08-12 VITALS — DIASTOLIC BLOOD PRESSURE: 97 MMHG | SYSTOLIC BLOOD PRESSURE: 148 MMHG

## 2025-08-12 DIAGNOSIS — R35.0 FREQUENCY OF MICTURITION: ICD-10-CM

## 2025-08-12 PROCEDURE — 54235 NJX CORPORA CAVERNOSA RX AGT: CPT

## 2025-08-12 PROCEDURE — 99214 OFFICE O/P EST MOD 30 MIN: CPT | Mod: 25

## 2025-08-12 PROCEDURE — 54200 INJECTION PX PEYRONIE DS: CPT

## 2025-08-12 RX ORDER — COLLAGENASE CLOSTRIDIUM HISTOLYTICUM 0.9 MG
0.9 KIT INJECTION DAILY
Refills: 0 | Status: COMPLETED | OUTPATIENT
Start: 2025-08-12

## 2025-08-12 RX ADMIN — COLLAGENASE CLOSTRIDIUM HISTOLYTICUM 0.58 MG: KIT at 00:00

## 2025-08-13 DIAGNOSIS — N48.6 INDURATION PENIS PLASTICA: ICD-10-CM

## 2025-08-14 ENCOUNTER — APPOINTMENT (OUTPATIENT)
Dept: UROLOGY | Facility: CLINIC | Age: 70
End: 2025-08-14

## 2025-08-14 ENCOUNTER — OUTPATIENT (OUTPATIENT)
Dept: OUTPATIENT SERVICES | Facility: HOSPITAL | Age: 70
LOS: 1 days | End: 2025-08-14
Payer: MEDICARE

## 2025-08-14 VITALS
SYSTOLIC BLOOD PRESSURE: 158 MMHG | DIASTOLIC BLOOD PRESSURE: 98 MMHG | RESPIRATION RATE: 16 BRPM | HEART RATE: 74 BPM | TEMPERATURE: 98 F | OXYGEN SATURATION: 98 %

## 2025-08-14 VITALS
SYSTOLIC BLOOD PRESSURE: 133 MMHG | TEMPERATURE: 97.8 F | DIASTOLIC BLOOD PRESSURE: 84 MMHG | RESPIRATION RATE: 15 BRPM | OXYGEN SATURATION: 100 % | HEART RATE: 70 BPM

## 2025-08-14 DIAGNOSIS — N48.6 INDURATION PENIS PLASTICA: ICD-10-CM

## 2025-08-14 DIAGNOSIS — R35.0 FREQUENCY OF MICTURITION: ICD-10-CM

## 2025-08-14 PROCEDURE — 54200 INJECTION PX PEYRONIE DS: CPT | Mod: 58

## 2025-08-14 PROCEDURE — 54200 INJECTION PX PEYRONIE DS: CPT

## 2025-08-14 RX ORDER — COLLAGENASE CLOSTRIDIUM HISTOLYTICUM 0.9 MG
0.9 KIT INJECTION
Refills: 0 | Status: COMPLETED | OUTPATIENT
Start: 2025-08-14

## 2025-08-14 RX ADMIN — COLLAGENASE CLOSTRIDIUM HISTOLYTICUM 0 MG: KIT at 00:00

## 2025-08-15 DIAGNOSIS — N48.6 INDURATION PENIS PLASTICA: ICD-10-CM

## 2025-08-26 ENCOUNTER — APPOINTMENT (OUTPATIENT)
Dept: INTERNAL MEDICINE | Facility: CLINIC | Age: 70
End: 2025-08-26

## 2025-09-02 ENCOUNTER — APPOINTMENT (OUTPATIENT)
Dept: UROLOGY | Facility: CLINIC | Age: 70
End: 2025-09-02

## (undated) DEVICE — SUT VICRYL 0 27" CT-1 UNDYED

## (undated) DEVICE — CHEST DRAIN PLEUR-EVAC WET/WET ADULT-PEDS SINGLE (QUICK)

## (undated) DEVICE — DRAPE MAGNETIC INSTRUMENT MEDIUM

## (undated) DEVICE — SYR SLIP 10CC

## (undated) DEVICE — CONNECTOR REDUCING STRAIGHT 3/8X0.25"

## (undated) DEVICE — SUT VICRYL 3-0 27" SH UNDYED

## (undated) DEVICE — GLV 5.5 PROTEXIS (WHITE)

## (undated) DEVICE — SUT VICRYL 2-0 27" UR-6

## (undated) DEVICE — ADAPTER FIBEROPTIC BRONCHOSCOPE DUAL AXIS SWIVEL

## (undated) DEVICE — PACK MAJOR ABDOMINAL WITH LAP

## (undated) DEVICE — DRAPE STERI-DRAPE MEDIUM W APERTURE

## (undated) DEVICE — SUT SILK 0 18" TIES

## (undated) DEVICE — DRSG STERISTRIPS 0.5 X 4"

## (undated) DEVICE — DRAPE LARGE SHEET 72X85"

## (undated) DEVICE — DRAPE GENERAL ENDOSCOPY

## (undated) DEVICE — SUT MONOCRYL 4-0 27" PS-2 UNDYED

## (undated) DEVICE — SOL ANTI FOG

## (undated) DEVICE — DRSG BENZOIN 0.6CC

## (undated) DEVICE — STAPLER SKIN VISI-STAT 35 WIDE

## (undated) DEVICE — PACK GENERAL MINOR

## (undated) DEVICE — TUBING SUCTION NONCONDUCTIVE 6MM X 12FT

## (undated) DEVICE — SUT SILK 2-0 30" TIES

## (undated) DEVICE — HAND-AID ARTERIAL WRIST SUPPORT

## (undated) DEVICE — SOL IRR POUR H2O 250ML

## (undated) DEVICE — SUT PROLENE 0 30" CT-1

## (undated) DEVICE — STAPLER ECHELON FLEX POWERED PLUS 340MM

## (undated) DEVICE — ENDOCATCH II 15MM

## (undated) DEVICE — SOL IRR POUR NS 0.9% 500ML

## (undated) DEVICE — DRSG TELFA 3 X 8

## (undated) DEVICE — PREP CHLORAPREP HI-LITE ORANGE 26ML

## (undated) DEVICE — Device

## (undated) DEVICE — FOLEY TRAY 16FR 5CC LF UMETER CLOSED

## (undated) DEVICE — ELCTR EXTENSION STRAIGHT

## (undated) DEVICE — VISITEC 4X4

## (undated) DEVICE — POSITIONER STRAP ARMBOARD VELCRO TS-30

## (undated) DEVICE — ELCTR GROUNDING PAD ADULT COVIDIEN

## (undated) DEVICE — DISSECTOR ENDOSCOPIC KITTNER SINGLE TIP

## (undated) DEVICE — CHEST DRAIN PLEUR-EVAC DRY/WET ADULT-PEDS SINGLE (QUICK)

## (undated) DEVICE — HANDPIECE INTERPULSE W/ COAXIAL FAN SPRAY TIP

## (undated) DEVICE — DRAPE INSTRUMENT POUCH 6.75" X 11"

## (undated) DEVICE — WARMING BLANKET FULL ADULT

## (undated) DEVICE — ENDOCATCH 10MM SPECIMEN POUCH

## (undated) DEVICE — ELCTR BOVIE TIP BLADE INSULATED 2.75" EDGE

## (undated) DEVICE — VENODYNE/SCD SLEEVE CALF MEDIUM

## (undated) DEVICE — ELCTR BOVIE TIP BLADE INSULATED 6.5" EDGE